# Patient Record
Sex: MALE | Race: WHITE | NOT HISPANIC OR LATINO | Employment: OTHER | URBAN - METROPOLITAN AREA
[De-identification: names, ages, dates, MRNs, and addresses within clinical notes are randomized per-mention and may not be internally consistent; named-entity substitution may affect disease eponyms.]

---

## 2023-04-19 ENCOUNTER — TELEPHONE (OUTPATIENT)
Dept: PRIMARY CARE | Facility: CLINIC | Age: 78
End: 2023-04-19
Payer: MEDICARE

## 2023-04-19 DIAGNOSIS — Z79.4 DIABETES MELLITUS TREATED WITH INSULIN (MULTI): Primary | ICD-10-CM

## 2023-04-19 DIAGNOSIS — E11.9 DIABETES MELLITUS TREATED WITH INSULIN (MULTI): Primary | ICD-10-CM

## 2023-04-19 RX ORDER — INSULIN DETEMIR 100 [IU]/ML
35 INJECTION, SOLUTION SUBCUTANEOUS 2 TIMES DAILY
Qty: 63 ML | Refills: 3 | Status: SHIPPED | OUTPATIENT
Start: 2023-04-19 | End: 2023-05-30 | Stop reason: SDUPTHER

## 2023-04-19 NOTE — TELEPHONE ENCOUNTER
Pt wife called and stated that the pharmacy needs a new rx  for levemir. Pharmacy no longer carries levemir touch it needs to be levimair flex pen sent to [facility]. Pt is out EY

## 2023-05-03 DIAGNOSIS — I10 PRIMARY HYPERTENSION: Primary | ICD-10-CM

## 2023-05-03 RX ORDER — LOSARTAN POTASSIUM 100 MG/1
TABLET ORAL
Qty: 90 TABLET | Refills: 1 | Status: SHIPPED | OUTPATIENT
Start: 2023-05-03 | End: 2023-05-30 | Stop reason: SDUPTHER

## 2023-05-19 ENCOUNTER — TELEPHONE (OUTPATIENT)
Dept: PRIMARY CARE | Facility: CLINIC | Age: 78
End: 2023-05-19
Payer: MEDICARE

## 2023-05-19 NOTE — TELEPHONE ENCOUNTER
Pt wife called and wanted to know what the name brand of sherri maria is called, the white tablet not capsules. Please advise and call kelsey back so she can order

## 2023-05-20 NOTE — TELEPHONE ENCOUNTER
I don't know if one brand of LiLabtrip Raul is better than any others.  Just get one that has good reviews on [employer] and is not too expensive.

## 2023-05-27 LAB
ALANINE AMINOTRANSFERASE (SGPT) (U/L) IN SER/PLAS: 13 U/L (ref 10–52)
ALBUMIN (G/DL) IN SER/PLAS: 4 G/DL (ref 3.4–5)
ALKALINE PHOSPHATASE (U/L) IN SER/PLAS: 60 U/L (ref 33–136)
ANION GAP IN SER/PLAS: 12 MMOL/L (ref 10–20)
ASPARTATE AMINOTRANSFERASE (SGOT) (U/L) IN SER/PLAS: 17 U/L (ref 9–39)
BASOPHILS (10*3/UL) IN BLOOD BY AUTOMATED COUNT: 0.08 X10E9/L (ref 0–0.1)
BASOPHILS/100 LEUKOCYTES IN BLOOD BY AUTOMATED COUNT: 0.7 % (ref 0–2)
BILIRUBIN TOTAL (MG/DL) IN SER/PLAS: 0.9 MG/DL (ref 0–1.2)
CALCIUM (MG/DL) IN SER/PLAS: 9.6 MG/DL (ref 8.6–10.3)
CARBON DIOXIDE, TOTAL (MMOL/L) IN SER/PLAS: 26 MMOL/L (ref 21–32)
CHLORIDE (MMOL/L) IN SER/PLAS: 104 MMOL/L (ref 98–107)
CHOLESTEROL (MG/DL) IN SER/PLAS: 115 MG/DL (ref 0–199)
CHOLESTEROL IN HDL (MG/DL) IN SER/PLAS: 29 MG/DL
CHOLESTEROL/HDL RATIO: 4
CREATININE (MG/DL) IN SER/PLAS: 1.26 MG/DL (ref 0.5–1.3)
EOSINOPHILS (10*3/UL) IN BLOOD BY AUTOMATED COUNT: 0.3 X10E9/L (ref 0–0.4)
EOSINOPHILS/100 LEUKOCYTES IN BLOOD BY AUTOMATED COUNT: 2.7 % (ref 0–6)
ERYTHROCYTE DISTRIBUTION WIDTH (RATIO) BY AUTOMATED COUNT: 16.2 % (ref 11.5–14.5)
ERYTHROCYTE MEAN CORPUSCULAR HEMOGLOBIN CONCENTRATION (G/DL) BY AUTOMATED: 31.8 G/DL (ref 32–36)
ERYTHROCYTE MEAN CORPUSCULAR VOLUME (FL) BY AUTOMATED COUNT: 86 FL (ref 80–100)
ERYTHROCYTES (10*6/UL) IN BLOOD BY AUTOMATED COUNT: 4.14 X10E12/L (ref 4.5–5.9)
ESTIMATED AVERAGE GLUCOSE FOR HBA1C: 114 MG/DL
GFR MALE: 59 ML/MIN/1.73M2
GLUCOSE (MG/DL) IN SER/PLAS: 231 MG/DL (ref 74–99)
HEMATOCRIT (%) IN BLOOD BY AUTOMATED COUNT: 35.5 % (ref 41–52)
HEMOGLOBIN (G/DL) IN BLOOD: 11.3 G/DL (ref 13.5–17.5)
HEMOGLOBIN A1C/HEMOGLOBIN TOTAL IN BLOOD: 5.6 %
IMMATURE GRANULOCYTES/100 LEUKOCYTES IN BLOOD BY AUTOMATED COUNT: 0.3 % (ref 0–0.9)
LDL: 45 MG/DL (ref 0–99)
LEUKOCYTES (10*3/UL) IN BLOOD BY AUTOMATED COUNT: 11.3 X10E9/L (ref 4.4–11.3)
LYMPHOCYTES (10*3/UL) IN BLOOD BY AUTOMATED COUNT: 5.41 X10E9/L (ref 0.8–3)
LYMPHOCYTES/100 LEUKOCYTES IN BLOOD BY AUTOMATED COUNT: 47.9 % (ref 13–44)
MONOCYTES (10*3/UL) IN BLOOD BY AUTOMATED COUNT: 0.76 X10E9/L (ref 0.05–0.8)
MONOCYTES/100 LEUKOCYTES IN BLOOD BY AUTOMATED COUNT: 6.7 % (ref 2–10)
NEUTROPHILS (10*3/UL) IN BLOOD BY AUTOMATED COUNT: 4.71 X10E9/L (ref 1.6–5.5)
NEUTROPHILS/100 LEUKOCYTES IN BLOOD BY AUTOMATED COUNT: 41.7 % (ref 40–80)
NON HDL CHOLESTEROL: 86 MG/DL
PLATELETS (10*3/UL) IN BLOOD AUTOMATED COUNT: 108 X10E9/L (ref 150–450)
POTASSIUM (MMOL/L) IN SER/PLAS: 4.1 MMOL/L (ref 3.5–5.3)
PROTEIN TOTAL: 6.2 G/DL (ref 6.4–8.2)
SODIUM (MMOL/L) IN SER/PLAS: 138 MMOL/L (ref 136–145)
TRIGLYCERIDE (MG/DL) IN SER/PLAS: 204 MG/DL (ref 0–149)
UREA NITROGEN (MG/DL) IN SER/PLAS: 29 MG/DL (ref 6–23)
VLDL: 41 MG/DL (ref 0–40)

## 2023-05-30 ENCOUNTER — TELEPHONE (OUTPATIENT)
Dept: PRIMARY CARE | Facility: CLINIC | Age: 78
End: 2023-05-30

## 2023-05-30 ENCOUNTER — OFFICE VISIT (OUTPATIENT)
Dept: PRIMARY CARE | Facility: CLINIC | Age: 78
End: 2023-05-30
Payer: MEDICARE

## 2023-05-30 VITALS
SYSTOLIC BLOOD PRESSURE: 110 MMHG | TEMPERATURE: 97.3 F | HEIGHT: 70 IN | OXYGEN SATURATION: 97 % | DIASTOLIC BLOOD PRESSURE: 58 MMHG | HEART RATE: 58 BPM | BODY MASS INDEX: 36.08 KG/M2 | WEIGHT: 252 LBS

## 2023-05-30 DIAGNOSIS — F03.90 DEMENTIA WITHOUT BEHAVIORAL DISTURBANCE, PSYCHOTIC DISTURBANCE, MOOD DISTURBANCE, OR ANXIETY, UNSPECIFIED DEMENTIA SEVERITY, UNSPECIFIED DEMENTIA TYPE (MULTI): ICD-10-CM

## 2023-05-30 DIAGNOSIS — E11.59 CORONARY ARTERY DISEASE DUE TO TYPE 2 DIABETES MELLITUS (MULTI): ICD-10-CM

## 2023-05-30 DIAGNOSIS — I25.10 CORONARY ARTERY DISEASE DUE TO TYPE 2 DIABETES MELLITUS (MULTI): ICD-10-CM

## 2023-05-30 DIAGNOSIS — R01.1 HEART MURMUR: ICD-10-CM

## 2023-05-30 DIAGNOSIS — E11.9 DIABETES MELLITUS TREATED WITH INSULIN (MULTI): ICD-10-CM

## 2023-05-30 DIAGNOSIS — E78.2 MIXED HYPERLIPIDEMIA: ICD-10-CM

## 2023-05-30 DIAGNOSIS — Z79.4 DIABETES MELLITUS TREATED WITH INSULIN (MULTI): ICD-10-CM

## 2023-05-30 DIAGNOSIS — I70.0 ATHEROSCLEROSIS OF AORTA (CMS-HCC): ICD-10-CM

## 2023-05-30 DIAGNOSIS — I10 PRIMARY HYPERTENSION: ICD-10-CM

## 2023-05-30 DIAGNOSIS — D64.9 ANEMIA, UNSPECIFIED TYPE: ICD-10-CM

## 2023-05-30 DIAGNOSIS — Z00.129 ENCOUNTER FOR ROUTINE CHILD HEALTH EXAMINATION W/O ABNORMAL FINDINGS: Primary | ICD-10-CM

## 2023-05-30 DIAGNOSIS — B37.2 CANDIDAL SKIN INFECTION: ICD-10-CM

## 2023-05-30 PROBLEM — R26.9 GAIT ABNORMALITY: Status: ACTIVE | Noted: 2023-05-30

## 2023-05-30 PROBLEM — H26.9 CATARACTS, BILATERAL: Status: ACTIVE | Noted: 2023-05-30

## 2023-05-30 PROBLEM — M11.261 CHONDROCALCINOSIS OF RIGHT KNEE: Status: ACTIVE | Noted: 2023-05-30

## 2023-05-30 PROBLEM — Z91.119 NONADHERENCE WITH DIETARY RESTRICTION: Status: ACTIVE | Noted: 2020-07-22

## 2023-05-30 PROBLEM — N18.31 STAGE 3A CHRONIC KIDNEY DISEASE (MULTI): Status: ACTIVE | Noted: 2019-11-04

## 2023-05-30 PROBLEM — E66.01 MORBID OBESITY (MULTI): Status: ACTIVE | Noted: 2019-11-04

## 2023-05-30 PROBLEM — M19.032 LOCALIZED PRIMARY OSTEOARTHRITIS OF CARPOMETACARPAL (CMC) JOINT OF LEFT WRIST: Status: ACTIVE | Noted: 2023-05-30

## 2023-05-30 PROBLEM — T46.1X5A: Status: ACTIVE | Noted: 2020-07-22

## 2023-05-30 PROBLEM — E11.65 HYPERGLYCEMIA DUE TO TYPE 2 DIABETES MELLITUS (MULTI): Status: ACTIVE | Noted: 2023-05-30

## 2023-05-30 PROBLEM — G56.22 LESION OF ULNAR NERVE, LEFT UPPER LIMB: Status: ACTIVE | Noted: 2023-05-30

## 2023-05-30 PROBLEM — M54.50 LOW BACK PAIN: Status: ACTIVE | Noted: 2019-11-04

## 2023-05-30 LAB
FERRITIN (UG/LL) IN SER/PLAS: 386 UG/L (ref 20–300)
IRON (UG/DL) IN SER/PLAS: 47 UG/DL (ref 35–150)
IRON BINDING CAPACITY (UG/DL) IN SER/PLAS: 260 UG/DL (ref 240–445)
IRON SATURATION (%) IN SER/PLAS: 18 % (ref 25–45)

## 2023-05-30 PROCEDURE — 3074F SYST BP LT 130 MM HG: CPT | Performed by: FAMILY MEDICINE

## 2023-05-30 PROCEDURE — 99214 OFFICE O/P EST MOD 30 MIN: CPT | Performed by: FAMILY MEDICINE

## 2023-05-30 PROCEDURE — 1159F MED LIST DOCD IN RCRD: CPT | Performed by: FAMILY MEDICINE

## 2023-05-30 PROCEDURE — 3078F DIAST BP <80 MM HG: CPT | Performed by: FAMILY MEDICINE

## 2023-05-30 RX ORDER — ROSUVASTATIN CALCIUM 40 MG/1
40 TABLET, COATED ORAL NIGHTLY
COMMUNITY
End: 2023-05-30 | Stop reason: SDUPTHER

## 2023-05-30 RX ORDER — INSULIN DETEMIR 100 [IU]/ML
35 INJECTION, SOLUTION SUBCUTANEOUS 2 TIMES DAILY
Qty: 63 ML | Refills: 3 | Status: SHIPPED | OUTPATIENT
Start: 2023-05-30 | End: 2023-11-21 | Stop reason: ALTCHOICE

## 2023-05-30 RX ORDER — CLOTRIMAZOLE AND BETAMETHASONE DIPROPIONATE 10; .64 MG/G; MG/G
1 CREAM TOPICAL 2 TIMES DAILY
COMMUNITY
Start: 2014-07-02 | End: 2023-12-14 | Stop reason: WASHOUT

## 2023-05-30 RX ORDER — DOXYLAMINE SUCCINATE 25 MG
TABLET ORAL 2 TIMES DAILY
Qty: 28 G | Refills: 1 | Status: SHIPPED | OUTPATIENT
Start: 2023-05-30 | End: 2023-12-14 | Stop reason: WASHOUT

## 2023-05-30 RX ORDER — INSULIN GLARGINE 100 [IU]/ML
INJECTION, SOLUTION SUBCUTANEOUS
COMMUNITY
Start: 2023-02-01 | End: 2023-05-30 | Stop reason: ALTCHOICE

## 2023-05-30 RX ORDER — TIMOLOL MALEATE 5 MG/ML
1 SOLUTION/ DROPS OPHTHALMIC 2 TIMES DAILY
COMMUNITY

## 2023-05-30 RX ORDER — BISOPROLOL FUMARATE 5 MG/1
5 TABLET, FILM COATED ORAL DAILY
COMMUNITY
Start: 2022-05-25 | End: 2023-05-30 | Stop reason: SDUPTHER

## 2023-05-30 RX ORDER — BLOOD SUGAR DIAGNOSTIC
STRIP MISCELLANEOUS
COMMUNITY
Start: 2023-05-25 | End: 2023-11-15 | Stop reason: SDUPTHER

## 2023-05-30 RX ORDER — BRIMONIDINE TARTRATE 2 MG/ML
1 SOLUTION/ DROPS OPHTHALMIC 2 TIMES DAILY
COMMUNITY
Start: 2017-08-01

## 2023-05-30 RX ORDER — BISOPROLOL FUMARATE 5 MG/1
5 TABLET, FILM COATED ORAL DAILY
Qty: 90 TABLET | Refills: 3 | Status: SHIPPED | OUTPATIENT
Start: 2023-05-30 | End: 2024-03-21 | Stop reason: SDUPTHER

## 2023-05-30 RX ORDER — HYDROCHLOROTHIAZIDE 25 MG/1
25 TABLET ORAL DAILY
Qty: 90 TABLET | Refills: 3 | Status: SHIPPED | OUTPATIENT
Start: 2023-05-30 | End: 2024-03-21 | Stop reason: SDUPTHER

## 2023-05-30 RX ORDER — INSULIN ASPART 100 [IU]/ML
35 INJECTION, SOLUTION INTRAVENOUS; SUBCUTANEOUS
Qty: 94.5 ML | Refills: 3 | Status: SHIPPED | OUTPATIENT
Start: 2023-05-30 | End: 2024-03-21 | Stop reason: SDUPTHER

## 2023-05-30 RX ORDER — ASPIRIN 81 MG/1
81 TABLET ORAL DAILY
COMMUNITY
Start: 2006-06-15

## 2023-05-30 RX ORDER — DONEPEZIL HYDROCHLORIDE 10 MG/1
10 TABLET, FILM COATED ORAL NIGHTLY
Qty: 90 TABLET | Refills: 3 | Status: SHIPPED | OUTPATIENT
Start: 2023-05-30 | End: 2023-08-22 | Stop reason: ALTCHOICE

## 2023-05-30 RX ORDER — FERROUS SULFATE 325(65) MG
1 TABLET ORAL 3 TIMES WEEKLY
COMMUNITY
End: 2023-05-30 | Stop reason: ALTCHOICE

## 2023-05-30 RX ORDER — INSULIN ASPART 100 [IU]/ML
INJECTION, SOLUTION INTRAVENOUS; SUBCUTANEOUS
COMMUNITY
End: 2023-05-30 | Stop reason: SDUPTHER

## 2023-05-30 RX ORDER — ROSUVASTATIN CALCIUM 40 MG/1
40 TABLET, COATED ORAL NIGHTLY
Qty: 90 TABLET | Refills: 3 | Status: SHIPPED | OUTPATIENT
Start: 2023-05-30 | End: 2024-03-21 | Stop reason: SDUPTHER

## 2023-05-30 RX ORDER — HYDROCHLOROTHIAZIDE 25 MG/1
25 TABLET ORAL DAILY
COMMUNITY
Start: 2022-05-25 | End: 2023-05-30 | Stop reason: SDUPTHER

## 2023-05-30 RX ORDER — DONEPEZIL HYDROCHLORIDE 10 MG/1
10 TABLET, FILM COATED ORAL DAILY
COMMUNITY
Start: 2022-10-05 | End: 2023-05-30 | Stop reason: SDUPTHER

## 2023-05-30 RX ORDER — LOSARTAN POTASSIUM 100 MG/1
100 TABLET ORAL DAILY
Qty: 90 TABLET | Refills: 3 | Status: SHIPPED | OUTPATIENT
Start: 2023-05-30 | End: 2024-03-21 | Stop reason: SDUPTHER

## 2023-05-30 ASSESSMENT — ENCOUNTER SYMPTOMS: HYPERTENSION: 1

## 2023-05-30 NOTE — TELEPHONE ENCOUNTER
----- Message from Ulices Chaudhary MD sent at 5/29/2023  8:26 PM EDT -----  Please call lab and add on iron TIBC and ferritin diagnosis anemia

## 2023-05-30 NOTE — PROGRESS NOTES
Subjective   Patient ID: Michael Dougherty is a 77 y.o. male who presents for Hypertension (Recheck), Hyperlipidemia (Review BW), and Diabetes.  Hypertension    Hyperlipidemia    Diabetes      DM - A1C is 5.9.  35/35/35.    Glucose log reviewed and scanned    HTN -  controlled  Lipids - Rosuvastatin 40 mg   CAD -  no sxs  CKD - GFR = >60, >60  Aortic valve sclerosis  Rash:  has itchy rash on L axilla  Anemia: HGB is lower.  Iron lower, ferritin is higher.  .   Feels well    Dementia: didn't benefit from lions candace.  Haven't tried Aricept yet.    Cough with ACE and ARB.  HCTZ 25mg - renal     Current Outpatient Medications on File Prior to Visit   Medication Sig Dispense Refill    aspirin 81 mg EC tablet Take 1 tablet (81 mg) by mouth once daily.      bisoprolol (Zebeta) 5 mg tablet Take 1 tablet (5 mg) by mouth once daily.      brimonidine (AlphaGAN P) 0.2 % ophthalmic solution Administer 1 drop into both eyes 2 times a day.      ferrous sulfate 325 (65 Fe) MG tablet Take 1 tablet (325 mg) by mouth 3 times a week.      hydroCHLOROthiazide (HYDRODiuril) 25 mg tablet Take 1 tablet (25 mg) by mouth once daily.      insulin detemir (Levemir FlexPen) 100 unit/mL (3 mL) pen Inject 35 Units under the skin in the morning and 35 Units before bedtime. Take as directed per insulin instructions.. 63 mL 3    losartan (Cozaar) 100 mg tablet TAKE 1 TABLET BY MOUTH EVERY DAY 90 tablet 1    NovoLOG FlexPen U-100 Insulin 100 unit/mL (3 mL) pen INJECT UP TO 75 UNITS UNDER THE SKIN THREE TIMES DAILY WITH MEALS      OneTouch Ultra Test strip USE 1 TEST STRIP 3 TIMES A DAY      rosuvastatin (Crestor) 40 mg tablet Take 1 tablet (40 mg) by mouth once daily at bedtime.      timolol (Timoptic) 0.5 % ophthalmic solution Administer 1 drop into both eyes 2 times a day.      clotrimazole-betamethasone (Lotrisone) cream Apply 1 Application topically 2 times a day.      donepezil (Aricept) 10 mg tablet Take 1 tablet (10 mg) by mouth early in the  morning..      insulin glargine (Lantus) 100 unit/mL (3 mL) pen Inject under the skin.       No current facility-administered medications on file prior to visit.        Vitals:    05/30/23 1343   BP: 110/58   Pulse: 58   Temp: 36.3 °C (97.3 °F)   SpO2: 97%       Review of Systems   All other systems reviewed and are negative.      Objective     Physical Exam  Constitutional:       Appearance: Normal appearance. He is well-developed.   HENT:      Head: Atraumatic.   Cardiovascular:      Rate and Rhythm: Normal rate and regular rhythm.      Heart sounds: Murmur heard.   Pulmonary:      Effort: Pulmonary effort is normal.      Breath sounds: Normal breath sounds.   Abdominal:      General: Bowel sounds are normal.      Palpations: Abdomen is soft.   Skin:     General: Skin is warm.   Neurological:      General: No focal deficit present.      Mental Status: He is alert.   Psychiatric:         Mood and Affect: Mood normal.         Orders Only on 05/27/2023   Component Date Value Ref Range Status    Glucose 05/27/2023 231 (H)  74 - 99 mg/dL Final    Sodium 05/27/2023 138  136 - 145 mmol/L Final    Potassium 05/27/2023 4.1  3.5 - 5.3 mmol/L Final    Chloride 05/27/2023 104  98 - 107 mmol/L Final    Bicarbonate 05/27/2023 26  21 - 32 mmol/L Final    Anion Gap 05/27/2023 12  10 - 20 mmol/L Final    Urea Nitrogen 05/27/2023 29 (H)  6 - 23 mg/dL Final    Creatinine 05/27/2023 1.26  0.50 - 1.30 mg/dL Final    GFR MALE 05/27/2023 59 (A)  >90 mL/min/1.73m2 Final    Comment:  CALCULATIONS OF ESTIMATED GFR ARE PERFORMED   USING THE 2021 CKD-EPI STUDY REFIT EQUATION   WITHOUT THE RACE VARIABLE FOR THE IDMS-TRACEABLE   CREATININE METHODS.    https://jasn.asnjournals.org/content/early/2021/09/22/ASN.2697660838      Calcium 05/27/2023 9.6  8.6 - 10.3 mg/dL Final    Albumin 05/27/2023 4.0  3.4 - 5.0 g/dL Final    Alkaline Phosphatase 05/27/2023 60  33 - 136 U/L Final    Total Protein 05/27/2023 6.2 (L)  6.4 - 8.2 g/dL Final    AST  05/27/2023 17  9 - 39 U/L Final    Total Bilirubin 05/27/2023 0.9  0.0 - 1.2 mg/dL Final    ALT (SGPT) 05/27/2023 13  10 - 52 U/L Final    Comment:  Patients treated with Sulfasalazine may generate    falsely decreased results for ALT.      WBC 05/27/2023 11.3  4.4 - 11.3 x10E9/L Final    RBC 05/27/2023 4.14 (L)  4.50 - 5.90 x10E12/L Final    Hemoglobin 05/27/2023 11.3 (L)  13.5 - 17.5 g/dL Final    Hematocrit 05/27/2023 35.5 (L)  41.0 - 52.0 % Final    MCV 05/27/2023 86  80 - 100 fL Final    MCHC 05/27/2023 31.8 (L)  32.0 - 36.0 g/dL Final    Platelets 05/27/2023 108 (L)  150 - 450 x10E9/L Final    RDW 05/27/2023 16.2 (H)  11.5 - 14.5 % Final    Neutrophils % 05/27/2023 41.7  40.0 - 80.0 % Final    Immature Granulocytes %, Automated 05/27/2023 0.3  0.0 - 0.9 % Final    Comment:  Immature Granulocyte Count (IG) includes promyelocytes,    myelocytes and metamyelocytes but does not include bands.   Percent differential counts (%) should be interpreted in the   context of the absolute cell counts (cells/L).      Lymphocytes % 05/27/2023 47.9  13.0 - 44.0 % Final    Monocytes % 05/27/2023 6.7  2.0 - 10.0 % Final    Eosinophils % 05/27/2023 2.7  0.0 - 6.0 % Final    Basophils % 05/27/2023 0.7  0.0 - 2.0 % Final    Neutrophils Absolute 05/27/2023 4.71  1.60 - 5.50 x10E9/L Final    Lymphocytes Absolute 05/27/2023 5.41 (H)  0.80 - 3.00 x10E9/L Final    Monocytes Absolute 05/27/2023 0.76  0.05 - 0.80 x10E9/L Final    Eosinophils Absolute 05/27/2023 0.30  0.00 - 0.40 x10E9/L Final    Basophils Absolute 05/27/2023 0.08  0.00 - 0.10 x10E9/L Final    Hemoglobin A1C 05/27/2023 5.6  % Final    Comment:      Diagnosis of Diabetes-Adults   Non-Diabetic: < or = 5.6%   Increased risk for developing diabetes: 5.7-6.4%   Diagnostic of diabetes: > or = 6.5%  .       Monitoring of Diabetes                Age (y)     Therapeutic Goal (%)   Adults:          >18           <7.0   Pediatrics:    13-18           <7.5                   7-12            <8.0                   0- 6            7.5-8.5   American Diabetes Association. Diabetes Care 33(S1), Jan 2010.      Estimated Average Glucose 05/27/2023 114  MG/DL Final    Cholesterol 05/27/2023 115  0 - 199 mg/dL Final    Comment: .      AGE      DESIRABLE   BORDERLINE HIGH   HIGH     0-19 Y     0 - 169       170 - 199     >/= 200    20-24 Y     0 - 189       190 - 224     >/= 225         >24 Y     0 - 199       200 - 239     >/= 240   **All ranges are based on fasting samples. Specific   therapeutic targets will vary based on patient-specific   cardiac risk.  .   Pediatric guidelines reference:Pediatrics 2011, 128(S5).   Adult guidelines reference: NCEP ATPIII Guidelines,     GELA 2001, 258:2489-97  .   Venipuncture immediately after or during the    administration of Metamizole may lead to falsely   low results. Testing should be performed immediately   prior to Metamizole dosing.      HDL 05/27/2023 29.0 (A)  mg/dL Final    Comment: .      AGE      VERY LOW   LOW     NORMAL    HIGH       0-19 Y       < 35   < 40     40-45     ----    20-24 Y       ----   < 40       >45     ----      >24 Y       ----   < 40     40-60      >60  .      Cholesterol/HDL Ratio 05/27/2023 4.0   Final    Comment: REF VALUES  DESIRABLE  < 3.4  HIGH RISK  > 5.0      LDL 05/27/2023 45  0 - 99 mg/dL Final    Comment: .                           NEAR      BORD      AGE      DESIRABLE  OPTIMAL    HIGH     HIGH     VERY HIGH     0-19 Y     0 - 109     ---    110-129   >/= 130     ----    20-24 Y     0 - 119     ---    120-159   >/= 160     ----      >24 Y     0 -  99   100-129  130-159   160-189     >/=190  .      VLDL 05/27/2023 41 (H)  0 - 40 mg/dL Final    Triglycerides 05/27/2023 204 (H)  0 - 149 mg/dL Final    Comment: .      AGE      DESIRABLE   BORDERLINE HIGH   HIGH     VERY HIGH   0 D-90 D    19 - 174         ----         ----        ----  91 D- 9 Y     0 -  74        75 -  99     >/= 100      ----    10-19 Y     0 -  89         90 - 129     >/= 130      ----    20-24 Y     0 - 114       115 - 149     >/= 150      ----         >24 Y     0 - 149       150 - 199    200- 499    >/= 500  .   Venipuncture immediately after or during the    administration of Metamizole may lead to falsely   low results. Testing should be performed immediately   prior to Metamizole dosing.      Non HDL Cholesterol 05/27/2023 86  mg/dL Final    Comment:     AGE      DESIRABLE   BORDERLINE HIGH   HIGH     VERY HIGH     0-19 Y     0 - 119       120 - 144     >/= 145    >/= 160    20-24 Y     0 - 149       150 - 189     >/= 190      ----         >24 Y    30 MG/DL ABOVE LDL CHOLESTEROL GOAL  .      Iron 05/27/2023 47  35 - 150 ug/dL Final    TIBC 05/27/2023 260  240 - 445 ug/dL Final    Iron Saturation 05/27/2023 18 (L)  25 - 45 % Final    Ferritin 05/27/2023 386 (H)  20 - 300 ug/L Final       Assessment/Plan   Problem List Items Addressed This Visit       Atherosclerosis of aorta (CMS/HCC)    Relevant Orders    Transthoracic echo (TTE) complete    CBC and Auto Differential    Heart murmur    Relevant Orders    Transthoracic echo (TTE) complete    Hyperlipidemia    Relevant Medications    rosuvastatin (Crestor) 40 mg tablet     Other Visit Diagnoses       Encounter for routine child health examination w/o abnormal findings    -  Primary    Primary hypertension        Relevant Medications    losartan (Cozaar) 100 mg tablet    hydroCHLOROthiazide (HYDRODiuril) 25 mg tablet    bisoprolol (Zebeta) 5 mg tablet    Other Relevant Orders    Comprehensive Metabolic Panel    Diabetes mellitus treated with insulin (CMS/HCC)        Relevant Medications    NovoLOG FlexPen U-100 Insulin 100 unit/mL (3 mL) pen    insulin detemir (Levemir FlexPen) 100 unit/mL (3 mL) pen    Other Relevant Orders    Hemoglobin A1C    Candidal skin infection        Relevant Medications    miconazole (AntifungaL Cream, miconazole,) 2 % cream    Dementia without behavioral disturbance, psychotic  disturbance, mood disturbance, or anxiety, unspecified dementia severity, unspecified dementia type (CMS/[facility])        Relevant Medications    donepezil (Aricept) 10 mg tablet    Anemia, unspecified type        Relevant Orders    Iron and TIBC    Ferritin    Vitamin B12    Coronary artery disease due to type 2 diabetes mellitus (CMS/[facility])        Relevant Medications    bisoprolol (Zebeta) 5 mg tablet    Other Relevant Orders    CBC and Auto Differential          OK to stop iron.  Suspect anemia of chronic disease.  Try taking OTC B12 and folate.  Checking echo to evaluate murmur.  Fu in 3 mo

## 2023-06-27 ENCOUNTER — TELEPHONE (OUTPATIENT)
Dept: PRIMARY CARE | Facility: CLINIC | Age: 78
End: 2023-06-27
Payer: MEDICARE

## 2023-06-29 ENCOUNTER — TELEPHONE (OUTPATIENT)
Dept: PRIMARY CARE | Facility: CLINIC | Age: 78
End: 2023-06-29
Payer: MEDICARE

## 2023-06-29 NOTE — TELEPHONE ENCOUNTER
----- Message from Kayleen Nunez DO sent at 6/28/2023 11:09 PM EDT -----  Pls tell pt that he has severe aortic valve disease. Rec he see cardiology to further eval and discuss. Has dilated rt ventrial and left atrium. Heart is puming well   ----- Message -----  From: Willow Hirsch RN  Sent: 6/23/2023   2:27 PM EDT  To: Kayleen Nunez DO    [facility] [place] pt

## 2023-06-29 NOTE — TELEPHONE ENCOUNTER
----- Message from Kayleen Nunez DO sent at 6/28/2023 11:09 PM EDT -----  Pls tell pt that he has severe aortic valve disease. Rec he see cardiology to further eval and discuss. Has dilated rt ventrial and left atrium. Heart is puming well   ----- Message -----  From: Willow Hirsch RN  Sent: 6/23/2023   2:27 PM EDT  To: Kayleen Nunez DO    [facility] [place] pt

## 2023-08-16 ENCOUNTER — LAB (OUTPATIENT)
Dept: LAB | Facility: LAB | Age: 78
End: 2023-08-16
Payer: MEDICARE

## 2023-08-16 DIAGNOSIS — D64.9 ANEMIA, UNSPECIFIED TYPE: ICD-10-CM

## 2023-08-16 DIAGNOSIS — Z79.4 DIABETES MELLITUS TREATED WITH INSULIN (MULTI): ICD-10-CM

## 2023-08-16 DIAGNOSIS — I10 PRIMARY HYPERTENSION: ICD-10-CM

## 2023-08-16 DIAGNOSIS — E11.9 DIABETES MELLITUS TREATED WITH INSULIN (MULTI): ICD-10-CM

## 2023-08-16 DIAGNOSIS — I70.0 ATHEROSCLEROSIS OF AORTA (CMS-HCC): ICD-10-CM

## 2023-08-16 DIAGNOSIS — I25.10 CORONARY ARTERY DISEASE DUE TO TYPE 2 DIABETES MELLITUS (MULTI): ICD-10-CM

## 2023-08-16 DIAGNOSIS — E11.59 CORONARY ARTERY DISEASE DUE TO TYPE 2 DIABETES MELLITUS (MULTI): ICD-10-CM

## 2023-08-16 LAB
ALANINE AMINOTRANSFERASE (SGPT) (U/L) IN SER/PLAS: 17 U/L (ref 10–52)
ALBUMIN (G/DL) IN SER/PLAS: 4.1 G/DL (ref 3.4–5)
ALKALINE PHOSPHATASE (U/L) IN SER/PLAS: 54 U/L (ref 33–136)
ANION GAP IN SER/PLAS: 11 MMOL/L (ref 10–20)
ASPARTATE AMINOTRANSFERASE (SGOT) (U/L) IN SER/PLAS: 18 U/L (ref 9–39)
BASOPHILS (10*3/UL) IN BLOOD BY AUTOMATED COUNT: 0.06 X10E9/L (ref 0–0.1)
BASOPHILS/100 LEUKOCYTES IN BLOOD BY AUTOMATED COUNT: 0.4 % (ref 0–2)
BILIRUBIN TOTAL (MG/DL) IN SER/PLAS: 0.8 MG/DL (ref 0–1.2)
CALCIUM (MG/DL) IN SER/PLAS: 9.2 MG/DL (ref 8.6–10.3)
CARBON DIOXIDE, TOTAL (MMOL/L) IN SER/PLAS: 28 MMOL/L (ref 21–32)
CHLORIDE (MMOL/L) IN SER/PLAS: 104 MMOL/L (ref 98–107)
COBALAMIN (VITAMIN B12) (PG/ML) IN SER/PLAS: 974 PG/ML (ref 211–911)
CREATININE (MG/DL) IN SER/PLAS: 1.49 MG/DL (ref 0.5–1.3)
EOSINOPHILS (10*3/UL) IN BLOOD BY AUTOMATED COUNT: 0.11 X10E9/L (ref 0–0.4)
EOSINOPHILS/100 LEUKOCYTES IN BLOOD BY AUTOMATED COUNT: 0.8 % (ref 0–6)
ERYTHROCYTE DISTRIBUTION WIDTH (RATIO) BY AUTOMATED COUNT: 16 % (ref 11.5–14.5)
ERYTHROCYTE MEAN CORPUSCULAR HEMOGLOBIN CONCENTRATION (G/DL) BY AUTOMATED: 32.5 G/DL (ref 32–36)
ERYTHROCYTE MEAN CORPUSCULAR VOLUME (FL) BY AUTOMATED COUNT: 85 FL (ref 80–100)
ERYTHROCYTES (10*6/UL) IN BLOOD BY AUTOMATED COUNT: 4.3 X10E12/L (ref 4.5–5.9)
ESTIMATED AVERAGE GLUCOSE FOR HBA1C: 123 MG/DL
FERRITIN (UG/LL) IN SER/PLAS: 376 UG/L (ref 20–300)
GFR MALE: 48 ML/MIN/1.73M2
GLUCOSE (MG/DL) IN SER/PLAS: 167 MG/DL (ref 74–99)
HEMATOCRIT (%) IN BLOOD BY AUTOMATED COUNT: 36.6 % (ref 41–52)
HEMOGLOBIN (G/DL) IN BLOOD: 11.9 G/DL (ref 13.5–17.5)
HEMOGLOBIN A1C/HEMOGLOBIN TOTAL IN BLOOD: 5.9 %
IMMATURE GRANULOCYTES/100 LEUKOCYTES IN BLOOD BY AUTOMATED COUNT: 0.4 % (ref 0–0.9)
IRON (UG/DL) IN SER/PLAS: 54 UG/DL (ref 35–150)
IRON BINDING CAPACITY (UG/DL) IN SER/PLAS: 286 UG/DL (ref 240–445)
IRON SATURATION (%) IN SER/PLAS: 19 % (ref 25–45)
LEUKOCYTES (10*3/UL) IN BLOOD BY AUTOMATED COUNT: 13.5 X10E9/L (ref 4.4–11.3)
LYMPHOCYTES (10*3/UL) IN BLOOD BY AUTOMATED COUNT: 5.82 X10E9/L (ref 0.8–3)
LYMPHOCYTES/100 LEUKOCYTES IN BLOOD BY AUTOMATED COUNT: 43.1 % (ref 13–44)
MONOCYTES (10*3/UL) IN BLOOD BY AUTOMATED COUNT: 0.8 X10E9/L (ref 0.05–0.8)
MONOCYTES/100 LEUKOCYTES IN BLOOD BY AUTOMATED COUNT: 5.9 % (ref 2–10)
NEUTROPHILS (10*3/UL) IN BLOOD BY AUTOMATED COUNT: 6.66 X10E9/L (ref 1.6–5.5)
NEUTROPHILS/100 LEUKOCYTES IN BLOOD BY AUTOMATED COUNT: 49.4 % (ref 40–80)
PLATELETS (10*3/UL) IN BLOOD AUTOMATED COUNT: 127 X10E9/L (ref 150–450)
POTASSIUM (MMOL/L) IN SER/PLAS: 4 MMOL/L (ref 3.5–5.3)
PROTEIN TOTAL: 6.2 G/DL (ref 6.4–8.2)
RBC MORPHOLOGY IN BLOOD: NORMAL
SODIUM (MMOL/L) IN SER/PLAS: 139 MMOL/L (ref 136–145)
UREA NITROGEN (MG/DL) IN SER/PLAS: 38 MG/DL (ref 6–23)

## 2023-08-16 PROCEDURE — 83540 ASSAY OF IRON: CPT

## 2023-08-16 PROCEDURE — 80053 COMPREHEN METABOLIC PANEL: CPT

## 2023-08-16 PROCEDURE — 36415 COLL VENOUS BLD VENIPUNCTURE: CPT

## 2023-08-16 PROCEDURE — 85025 COMPLETE CBC W/AUTO DIFF WBC: CPT

## 2023-08-16 PROCEDURE — 82728 ASSAY OF FERRITIN: CPT

## 2023-08-16 PROCEDURE — 83550 IRON BINDING TEST: CPT

## 2023-08-16 PROCEDURE — 83036 HEMOGLOBIN GLYCOSYLATED A1C: CPT

## 2023-08-16 PROCEDURE — 82607 VITAMIN B-12: CPT

## 2023-08-21 ENCOUNTER — APPOINTMENT (OUTPATIENT)
Dept: PRIMARY CARE | Facility: CLINIC | Age: 78
End: 2023-08-21
Payer: MEDICARE

## 2023-08-22 ENCOUNTER — OFFICE VISIT (OUTPATIENT)
Dept: PRIMARY CARE | Facility: CLINIC | Age: 78
End: 2023-08-22
Payer: MEDICARE

## 2023-08-22 VITALS
OXYGEN SATURATION: 97 % | SYSTOLIC BLOOD PRESSURE: 126 MMHG | WEIGHT: 245 LBS | DIASTOLIC BLOOD PRESSURE: 72 MMHG | HEART RATE: 64 BPM | TEMPERATURE: 97 F | BODY MASS INDEX: 34.9 KG/M2

## 2023-08-22 DIAGNOSIS — Z79.4 TYPE 2 DIABETES MELLITUS WITHOUT COMPLICATION, WITH LONG-TERM CURRENT USE OF INSULIN (MULTI): Primary | ICD-10-CM

## 2023-08-22 DIAGNOSIS — I10 ESSENTIAL HYPERTENSION: ICD-10-CM

## 2023-08-22 DIAGNOSIS — E78.2 MIXED HYPERLIPIDEMIA: ICD-10-CM

## 2023-08-22 DIAGNOSIS — I25.10 CORONARY ARTERIOSCLEROSIS: ICD-10-CM

## 2023-08-22 DIAGNOSIS — I35.0 AORTIC VALVE STENOSIS, ETIOLOGY OF CARDIAC VALVE DISEASE UNSPECIFIED: ICD-10-CM

## 2023-08-22 DIAGNOSIS — E11.9 TYPE 2 DIABETES MELLITUS WITHOUT COMPLICATION, WITH LONG-TERM CURRENT USE OF INSULIN (MULTI): Primary | ICD-10-CM

## 2023-08-22 PROCEDURE — 99214 OFFICE O/P EST MOD 30 MIN: CPT | Performed by: FAMILY MEDICINE

## 2023-08-22 PROCEDURE — 3078F DIAST BP <80 MM HG: CPT | Performed by: FAMILY MEDICINE

## 2023-08-22 PROCEDURE — 1036F TOBACCO NON-USER: CPT | Performed by: FAMILY MEDICINE

## 2023-08-22 PROCEDURE — 3074F SYST BP LT 130 MM HG: CPT | Performed by: FAMILY MEDICINE

## 2023-08-22 PROCEDURE — 1159F MED LIST DOCD IN RCRD: CPT | Performed by: FAMILY MEDICINE

## 2023-08-22 RX ORDER — PEN NEEDLE, DIABETIC 30 GX3/16"
NEEDLE, DISPOSABLE MISCELLANEOUS
Qty: 300 EACH | Refills: 3 | Status: SHIPPED | OUTPATIENT
Start: 2023-08-22 | End: 2024-03-21 | Stop reason: SDUPTHER

## 2023-08-22 RX ORDER — FLASH GLUCOSE SCANNING READER
EACH MISCELLANEOUS
Qty: 1 EACH | Refills: 0 | Status: SHIPPED | OUTPATIENT
Start: 2023-08-22

## 2023-08-22 RX ORDER — FLASH GLUCOSE SENSOR
KIT MISCELLANEOUS
Qty: 1 EACH | Refills: 0 | Status: SHIPPED | OUTPATIENT
Start: 2023-08-22 | End: 2024-03-21 | Stop reason: SDUPTHER

## 2023-08-22 ASSESSMENT — ENCOUNTER SYMPTOMS: HYPERTENSION: 1

## 2023-08-22 NOTE — PROGRESS NOTES
Subjective   Patient ID: Michael Dougherty is a 77 y.o. male who presents for Hypertension (Recheck. Review labs.).  Hypertension      DM - A1C is 5.9.  35/35/35.    Glucose log reviewed and scanned    HTN -  controlled  Lipids - Rosuvastatin 40 mg   CAD -  no sxs  CKD - GFR = >60, >60  Aortic valve stenosis  Rash:  has itchy rash on L axilla  Anemia: HGB is higher  .   Feels well    Dementia: couldnt' tolerate aricept.  Has been getting worse.    Cough with ACE and ARB.  HCTZ 25mg - renal     Patient Active Problem List   Diagnosis    Adverse effect of calcium-channel blocker    Atherosclerosis of aorta (CMS/HCC)    Cataracts, bilateral    Chondrocalcinosis of right knee    Coronary arteriosclerosis    Diabetes mellitus (CMS/HCC)    Gait abnormality    Heart murmur    Essential hypertension    Hyperglycemia due to type 2 diabetes mellitus (CMS/HCC)    Hyperlipidemia    Lesion of ulnar nerve, left upper limb    Localized primary osteoarthritis of carpometacarpal (CMC) joint of left wrist    Low back pain    Morbid obesity (CMS/HCC)    Nonadherence with dietary restriction    Stage 3a chronic kidney disease (CMS/HCC)       Social Connections: Not on file       Current Outpatient Medications on File Prior to Visit   Medication Sig Dispense Refill    aspirin 81 mg EC tablet Take 1 tablet (81 mg) by mouth once daily.      bisoprolol (Zebeta) 5 mg tablet Take 1 tablet (5 mg) by mouth once daily. 90 tablet 3    brimonidine (AlphaGAN P) 0.2 % ophthalmic solution Administer 1 drop into both eyes 2 times a day.      clotrimazole-betamethasone (Lotrisone) cream Apply 1 Application topically 2 times a day.      hydroCHLOROthiazide (HYDRODiuril) 25 mg tablet Take 1 tablet (25 mg) by mouth once daily. 90 tablet 3    insulin detemir (Levemir FlexPen) 100 unit/mL (3 mL) pen Inject 35 Units under the skin 2 times a day. Take as directed per insulin instructions. 63 mL 3    losartan (Cozaar) 100 mg tablet Take 1 tablet (100 mg) by mouth  once daily. 90 tablet 3    miconazole (AntifungaL Cream, miconazole,) 2 % cream Apply topically 2 times a day. 28 g 1    NovoLOG FlexPen U-100 Insulin 100 unit/mL (3 mL) pen Inject 35 Units under the skin 3 times a day with meals. Take as directed per insulin instructions. 94.5 mL 3    OneTouch Ultra Test strip USE 1 TEST STRIP 3 TIMES A DAY      rosuvastatin (Crestor) 40 mg tablet Take 1 tablet (40 mg) by mouth once daily at bedtime. 90 tablet 3    timolol (Timoptic) 0.5 % ophthalmic solution Administer 1 drop into both eyes 2 times a day.      [DISCONTINUED] donepezil (Aricept) 10 mg tablet Take 1 tablet (10 mg) by mouth once daily at bedtime. 90 tablet 3     No current facility-administered medications on file prior to visit.        Vitals:    08/22/23 1354   BP: 126/72   Pulse: 64   Temp: 36.1 °C (97 °F)   SpO2: 97%     Vitals:    08/22/23 1354   Weight: 111 kg (245 lb)       Review of Systems   All other systems reviewed and are negative.      Objective     Physical Exam  Vitals reviewed.   Constitutional:       General: He is not in acute distress.     Appearance: Normal appearance. He is well-developed. He is not diaphoretic.   HENT:      Head: Normocephalic and atraumatic.      Right Ear: Tympanic membrane normal.      Left Ear: Tympanic membrane normal.      Nose: Nose normal.      Mouth/Throat:      Mouth: Mucous membranes are moist.   Eyes:      Pupils: Pupils are equal, round, and reactive to light.   Cardiovascular:      Rate and Rhythm: Normal rate and regular rhythm.      Heart sounds: Normal heart sounds. No murmur heard.     No friction rub. No gallop.   Pulmonary:      Effort: Pulmonary effort is normal.      Breath sounds: Normal breath sounds. No rales.   Abdominal:      General: Bowel sounds are normal.      Palpations: Abdomen is soft.      Tenderness: There is no abdominal tenderness.   Musculoskeletal:      Cervical back: Normal range of motion and neck supple.   Skin:     General: Skin is  warm and dry.   Neurological:      Mental Status: He is alert.   Psychiatric:         Mood and Affect: Mood normal.         Lab on 08/16/2023   Component Date Value Ref Range Status    Hemoglobin A1C 08/16/2023 5.9 (A)  % Final         Diagnosis of Diabetes-Adults   Non-Diabetic: < or = 5.6%   Increased risk for developing diabetes: 5.7-6.4%   Diagnostic of diabetes: > or = 6.5%  .       Monitoring of Diabetes                Age (y)     Therapeutic Goal (%)   Adults:          >18           <7.0   Pediatrics:    13-18           <7.5                   7-12           <8.0                   0- 6            7.5-8.5   American Diabetes Association. Diabetes Care 33(S1), Jan 2010.    Estimated Average Glucose 08/16/2023 123  MG/DL Final    Glucose 08/16/2023 167 (H)  74 - 99 mg/dL Final    Sodium 08/16/2023 139  136 - 145 mmol/L Final    Potassium 08/16/2023 4.0  3.5 - 5.3 mmol/L Final    Chloride 08/16/2023 104  98 - 107 mmol/L Final    Bicarbonate 08/16/2023 28  21 - 32 mmol/L Final    Anion Gap 08/16/2023 11  10 - 20 mmol/L Final    Urea Nitrogen 08/16/2023 38 (H)  6 - 23 mg/dL Final    Creatinine 08/16/2023 1.49 (H)  0.50 - 1.30 mg/dL Final    GFR MALE 08/16/2023 48 (A)  >90 mL/min/1.73m2 Final     CALCULATIONS OF ESTIMATED GFR ARE PERFORMED   USING THE 2021 CKD-EPI STUDY REFIT EQUATION   WITHOUT THE RACE VARIABLE FOR THE IDMS-TRACEABLE   CREATININE METHODS.    https://jasn.asnjournals.org/content/early/2021/09/22/ASN.9251199743    Calcium 08/16/2023 9.2  8.6 - 10.3 mg/dL Final    Albumin 08/16/2023 4.1  3.4 - 5.0 g/dL Final    Alkaline Phosphatase 08/16/2023 54  33 - 136 U/L Final    Total Protein 08/16/2023 6.2 (L)  6.4 - 8.2 g/dL Final    AST 08/16/2023 18  9 - 39 U/L Final    Total Bilirubin 08/16/2023 0.8  0.0 - 1.2 mg/dL Final    ALT (SGPT) 08/16/2023 17  10 - 52 U/L Final     Patients treated with Sulfasalazine may generate    falsely decreased results for ALT.    WBC 08/16/2023 13.5 (H)  4.4 - 11.3 x10E9/L  Final    RBC 08/16/2023 4.30 (L)  4.50 - 5.90 x10E12/L Final    Hemoglobin 08/16/2023 11.9 (L)  13.5 - 17.5 g/dL Final    Hematocrit 08/16/2023 36.6 (L)  41.0 - 52.0 % Final    MCV 08/16/2023 85  80 - 100 fL Final    MCHC 08/16/2023 32.5  32.0 - 36.0 g/dL Final    Platelets 08/16/2023 127 (L)  150 - 450 x10E9/L Final    RDW 08/16/2023 16.0 (H)  11.5 - 14.5 % Final    Neutrophils % 08/16/2023 49.4  40.0 - 80.0 % Final    Immature Granulocytes %, Automated 08/16/2023 0.4  0.0 - 0.9 % Final     Immature Granulocyte Count (IG) includes promyelocytes,    myelocytes and metamyelocytes but does not include bands.   Percent differential counts (%) should be interpreted in the   context of the absolute cell counts (cells/L).    Lymphocytes % 08/16/2023 43.1  13.0 - 44.0 % Final    Monocytes % 08/16/2023 5.9  2.0 - 10.0 % Final    Eosinophils % 08/16/2023 0.8  0.0 - 6.0 % Final    Basophils % 08/16/2023 0.4  0.0 - 2.0 % Final    Neutrophils Absolute 08/16/2023 6.66 (H)  1.60 - 5.50 x10E9/L Final    Lymphocytes Absolute 08/16/2023 5.82 (H)  0.80 - 3.00 x10E9/L Final    Monocytes Absolute 08/16/2023 0.80  0.05 - 0.80 x10E9/L Final    Eosinophils Absolute 08/16/2023 0.11  0.00 - 0.40 x10E9/L Final    Basophils Absolute 08/16/2023 0.06  0.00 - 0.10 x10E9/L Final    Iron 08/16/2023 54  35 - 150 ug/dL Final    TIBC 08/16/2023 286  240 - 445 ug/dL Final    Iron Saturation 08/16/2023 19 (L)  25 - 45 % Final    Ferritin 08/16/2023 376 (H)  20 - 300 ug/L Final    Vitamin B-12 08/16/2023 974 (H)  211 - 911 pg/mL Final    RBC Morphology 08/16/2023 SEE COMMENT   Final    NO SIGNIFICANT RBC ABNORMALITIES SEEN ON  SMEAR REVIEW.       Assessment/Plan   Problem List Items Addressed This Visit       Coronary arteriosclerosis    Diabetes mellitus (CMS/HCC) - Primary    Relevant Orders    Comprehensive Metabolic Panel    Hemoglobin A1C    Lipid Panel    CBC and Auto Differential    Essential hypertension    Hyperlipidemia     Other Visit  Diagnoses       Aortic valve stenosis, etiology of cardiac valve disease unspecified              Cut down on insulin push fluids.  Refilled pts meds.  Monitor for worsening sx from aortic stenosis.  Fu in 3 mo

## 2023-09-06 ENCOUNTER — CLINICAL SUPPORT (OUTPATIENT)
Dept: PRIMARY CARE | Facility: CLINIC | Age: 78
End: 2023-09-06
Payer: MEDICARE

## 2023-09-06 DIAGNOSIS — Z23 NEED FOR IMMUNIZATION AGAINST INFLUENZA: ICD-10-CM

## 2023-09-06 PROCEDURE — 90662 IIV NO PRSV INCREASED AG IM: CPT | Performed by: FAMILY MEDICINE

## 2023-09-06 PROCEDURE — G0008 ADMIN INFLUENZA VIRUS VAC: HCPCS | Performed by: FAMILY MEDICINE

## 2023-10-24 ENCOUNTER — OFFICE VISIT (OUTPATIENT)
Dept: PRIMARY CARE | Facility: CLINIC | Age: 78
End: 2023-10-24
Payer: MEDICARE

## 2023-10-24 VITALS
TEMPERATURE: 96.9 F | SYSTOLIC BLOOD PRESSURE: 140 MMHG | BODY MASS INDEX: 35.08 KG/M2 | HEART RATE: 68 BPM | OXYGEN SATURATION: 97 % | WEIGHT: 241 LBS | DIASTOLIC BLOOD PRESSURE: 90 MMHG

## 2023-10-24 DIAGNOSIS — M77.11 LATERAL EPICONDYLITIS OF RIGHT ELBOW: Primary | ICD-10-CM

## 2023-10-24 PROCEDURE — 3077F SYST BP >= 140 MM HG: CPT | Performed by: FAMILY MEDICINE

## 2023-10-24 PROCEDURE — 1036F TOBACCO NON-USER: CPT | Performed by: FAMILY MEDICINE

## 2023-10-24 PROCEDURE — 1159F MED LIST DOCD IN RCRD: CPT | Performed by: FAMILY MEDICINE

## 2023-10-24 PROCEDURE — 99213 OFFICE O/P EST LOW 20 MIN: CPT | Performed by: FAMILY MEDICINE

## 2023-10-24 PROCEDURE — 3080F DIAST BP >= 90 MM HG: CPT | Performed by: FAMILY MEDICINE

## 2023-10-24 NOTE — PROGRESS NOTES
Subjective   Patient ID: Michael Dougherty is a 78 y.o. male who presents for pt c/o painful bump on right arm nki (X 1 month).  HPI  Pt reports pain on R elbow x 1 mo.  Sometimes swollen, but not all the time.   Has been doing more yardwork like raking leaves and such.  No fevers or chills.  Tender to palpation at times.    Patient Active Problem List   Diagnosis    Adverse effect of calcium-channel blocker    Atherosclerosis of aorta (CMS/HCC)    Cataracts, bilateral    Chondrocalcinosis of right knee    Coronary arteriosclerosis    Diabetes mellitus (CMS/HCC)    Gait abnormality    Heart murmur    Essential hypertension    Hyperglycemia due to type 2 diabetes mellitus (CMS/HCC)    Hyperlipidemia    Lesion of ulnar nerve, left upper limb    Localized primary osteoarthritis of carpometacarpal (CMC) joint of left wrist    Low back pain    Morbid obesity (CMS/HCC)    Nonadherence with dietary restriction    Stage 3a chronic kidney disease (CMS/HCC)       Social Connections: Not on file       Current Outpatient Medications on File Prior to Visit   Medication Sig Dispense Refill    aspirin 81 mg EC tablet Take 1 tablet (81 mg) by mouth once daily.      bisoprolol (Zebeta) 5 mg tablet Take 1 tablet (5 mg) by mouth once daily. 90 tablet 3    brimonidine (AlphaGAN P) 0.2 % ophthalmic solution Administer 1 drop into both eyes 2 times a day.      clotrimazole-betamethasone (Lotrisone) cream Apply 1 Application topically 2 times a day.      FreeStyle Lamine reader (FreeStyle Lamine 2 [place]) misc Use as instructed 1 each 0    FreeStyle Lamine sensor system (FreeStyle Lamine 2 Sensor) kit Use as instructed, every 2 weeks 1 each 0    hydroCHLOROthiazide (HYDRODiuril) 25 mg tablet Take 1 tablet (25 mg) by mouth once daily. 90 tablet 3    insulin detemir (Levemir FlexPen) 100 unit/mL (3 mL) pen Inject 35 Units under the skin 2 times a day. Take as directed per insulin instructions. 63 mL 3    losartan (Cozaar) 100 mg tablet Take 1 tablet  "(100 mg) by mouth once daily. 90 tablet 3    miconazole (AntifungaL Cream, miconazole,) 2 % cream Apply topically 2 times a day. 28 g 1    NovoLOG FlexPen U-100 Insulin 100 unit/mL (3 mL) pen Inject 35 Units under the skin 3 times a day with meals. Take as directed per insulin instructions. 94.5 mL 3    OneTouch Ultra Test strip USE 1 TEST STRIP 3 TIMES A DAY      pen needle, diabetic 32 gauge x 5/32\" needle To be used  each 3    rosuvastatin (Crestor) 40 mg tablet Take 1 tablet (40 mg) by mouth once daily at bedtime. 90 tablet 3    timolol (Timoptic) 0.5 % ophthalmic solution Administer 1 drop into both eyes 2 times a day.       No current facility-administered medications on file prior to visit.        Vitals:    10/24/23 1147   BP: 140/90   Pulse: 68   Temp: 36.1 °C (96.9 °F)   SpO2: 97%     Vitals:    10/24/23 1147   Weight: 109 kg (241 lb)       Review of Systems   All other systems reviewed and are negative.      Objective     Physical Exam  Musculoskeletal:      Comments: Tender to palpation at R lateral epicondyle         No visits with results within 2 Month(s) from this visit.   Latest known visit with results is:   Lab on 08/16/2023   Component Date Value Ref Range Status    Hemoglobin A1C 08/16/2023 5.9 (A)  % Final         Diagnosis of Diabetes-Adults   Non-Diabetic: < or = 5.6%   Increased risk for developing diabetes: 5.7-6.4%   Diagnostic of diabetes: > or = 6.5%  .       Monitoring of Diabetes                Age (y)     Therapeutic Goal (%)   Adults:          >18           <7.0   Pediatrics:    13-18           <7.5                   7-12           <8.0                   0- 6            7.5-8.5   American Diabetes Association. Diabetes Care 33(S1), Jan 2010.    Estimated Average Glucose 08/16/2023 123  MG/DL Final    Glucose 08/16/2023 167 (H)  74 - 99 mg/dL Final    Sodium 08/16/2023 139  136 - 145 mmol/L Final    Potassium 08/16/2023 4.0  3.5 - 5.3 mmol/L Final    Chloride 08/16/2023 104  98 " - 107 mmol/L Final    Bicarbonate 08/16/2023 28  21 - 32 mmol/L Final    Anion Gap 08/16/2023 11  10 - 20 mmol/L Final    Urea Nitrogen 08/16/2023 38 (H)  6 - 23 mg/dL Final    Creatinine 08/16/2023 1.49 (H)  0.50 - 1.30 mg/dL Final    GFR MALE 08/16/2023 48 (A)  >90 mL/min/1.73m2 Final     CALCULATIONS OF ESTIMATED GFR ARE PERFORMED   USING THE 2021 CKD-EPI STUDY REFIT EQUATION   WITHOUT THE RACE VARIABLE FOR THE IDMS-TRACEABLE   CREATININE METHODS.    https://jasn.asnjournals.org/content/early/2021/09/22/ASN.5069279006    Calcium 08/16/2023 9.2  8.6 - 10.3 mg/dL Final    Albumin 08/16/2023 4.1  3.4 - 5.0 g/dL Final    Alkaline Phosphatase 08/16/2023 54  33 - 136 U/L Final    Total Protein 08/16/2023 6.2 (L)  6.4 - 8.2 g/dL Final    AST 08/16/2023 18  9 - 39 U/L Final    Total Bilirubin 08/16/2023 0.8  0.0 - 1.2 mg/dL Final    ALT (SGPT) 08/16/2023 17  10 - 52 U/L Final     Patients treated with Sulfasalazine may generate    falsely decreased results for ALT.    WBC 08/16/2023 13.5 (H)  4.4 - 11.3 x10E9/L Final    RBC 08/16/2023 4.30 (L)  4.50 - 5.90 x10E12/L Final    Hemoglobin 08/16/2023 11.9 (L)  13.5 - 17.5 g/dL Final    Hematocrit 08/16/2023 36.6 (L)  41.0 - 52.0 % Final    MCV 08/16/2023 85  80 - 100 fL Final    MCHC 08/16/2023 32.5  32.0 - 36.0 g/dL Final    Platelets 08/16/2023 127 (L)  150 - 450 x10E9/L Final    RDW 08/16/2023 16.0 (H)  11.5 - 14.5 % Final    Neutrophils % 08/16/2023 49.4  40.0 - 80.0 % Final    Immature Granulocytes %, Automated 08/16/2023 0.4  0.0 - 0.9 % Final     Immature Granulocyte Count (IG) includes promyelocytes,    myelocytes and metamyelocytes but does not include bands.   Percent differential counts (%) should be interpreted in the   context of the absolute cell counts (cells/L).    Lymphocytes % 08/16/2023 43.1  13.0 - 44.0 % Final    Monocytes % 08/16/2023 5.9  2.0 - 10.0 % Final    Eosinophils % 08/16/2023 0.8  0.0 - 6.0 % Final    Basophils % 08/16/2023 0.4  0.0 - 2.0 %  Final    Neutrophils Absolute 08/16/2023 6.66 (H)  1.60 - 5.50 x10E9/L Final    Lymphocytes Absolute 08/16/2023 5.82 (H)  0.80 - 3.00 x10E9/L Final    Monocytes Absolute 08/16/2023 0.80  0.05 - 0.80 x10E9/L Final    Eosinophils Absolute 08/16/2023 0.11  0.00 - 0.40 x10E9/L Final    Basophils Absolute 08/16/2023 0.06  0.00 - 0.10 x10E9/L Final    Iron 08/16/2023 54  35 - 150 ug/dL Final    TIBC 08/16/2023 286  240 - 445 ug/dL Final    Iron Saturation 08/16/2023 19 (L)  25 - 45 % Final    Ferritin 08/16/2023 376 (H)  20 - 300 ug/L Final    Vitamin B-12 08/16/2023 974 (H)  211 - 911 pg/mL Final    RBC Morphology 08/16/2023 SEE COMMENT   Final    NO SIGNIFICANT RBC ABNORMALITIES SEEN ON  SMEAR REVIEW.       Assessment/Plan   Problem List Items Addressed This Visit    None  Visit Diagnoses         Codes    Lateral epicondylitis of right elbow    -  Primary M77.11           Encouraged rest.  Can try Volteran gel.  Try PT exercises.  If not getting better ref to ortho for shot.

## 2023-11-13 ENCOUNTER — OFFICE VISIT (OUTPATIENT)
Dept: ORTHOPEDIC SURGERY | Facility: CLINIC | Age: 78
End: 2023-11-13
Payer: MEDICARE

## 2023-11-13 DIAGNOSIS — M19.032 LOCALIZED PRIMARY OSTEOARTHRITIS OF CARPOMETACARPAL (CMC) JOINT OF LEFT WRIST: Primary | ICD-10-CM

## 2023-11-13 PROCEDURE — 3080F DIAST BP >= 90 MM HG: CPT | Performed by: ORTHOPAEDIC SURGERY

## 2023-11-13 PROCEDURE — 1159F MED LIST DOCD IN RCRD: CPT | Performed by: ORTHOPAEDIC SURGERY

## 2023-11-13 PROCEDURE — 99214 OFFICE O/P EST MOD 30 MIN: CPT | Performed by: ORTHOPAEDIC SURGERY

## 2023-11-13 PROCEDURE — 20600 DRAIN/INJ JOINT/BURSA W/O US: CPT | Performed by: ORTHOPAEDIC SURGERY

## 2023-11-13 PROCEDURE — 1160F RVW MEDS BY RX/DR IN RCRD: CPT | Performed by: ORTHOPAEDIC SURGERY

## 2023-11-13 PROCEDURE — 3077F SYST BP >= 140 MM HG: CPT | Performed by: ORTHOPAEDIC SURGERY

## 2023-11-13 PROCEDURE — 1126F AMNT PAIN NOTED NONE PRSNT: CPT | Performed by: ORTHOPAEDIC SURGERY

## 2023-11-13 PROCEDURE — 1036F TOBACCO NON-USER: CPT | Performed by: ORTHOPAEDIC SURGERY

## 2023-11-13 NOTE — PROGRESS NOTES
4 month follow up Left CMC injections last one 7/27/2023 - Patient would like another injection - no new injury

## 2023-11-13 NOTE — PROGRESS NOTES
78 y.o. male presents today for follow up left base of thumb pain. The patient reports symptoms for years, relieved with shots, last one about 4 months ago. Pain is controlled. Patient reports no numbness and tingling.  Reports no previous surgeries or trauma to the area.  Reports pain worse with use, better at rest.  Pain dull ache, sharp at times. Would like another shot.     Review of Systems    Constitutional: no fever, no chills, not feeling tired, no recent weight gain and no recent weight loss.   ENT: no nosebleeds.   Cardiovascular: no chest pain.   Respiratory: no shortness of breath and no cough.   Gastrointestinal: no abdominal pain, no nausea, no vomiting and no diarrhea.   Musculoskeletal: per HPI  Integumentary: no rashes and no skin wound.   Neurological: no headache.   Psychiatric: no depression and no sleep disturbances.   Endocrine: no muscle weakness and no muscle cramps.   Hematologic/Lymphatic: no swollen glands and no tendency for easy bruising.     All other systems have been reviewed and are negative for complaint    Patient's past medical history, past surgical history, allergies, and medications have been reviewed unless otherwise noted in the chart.     CMC Arthritis Exam  Inspection:  no evidence of infection, no edema, no erythema, no ecchymosis, Palpation:  compartments are soft, pain with palpation over the Thumb CMC joint, Range of Motion:  full wrist and finger range of motion, Stability:  no wrist or finger instability detected, Strength:  5/5  and pinch strength, Skin:  intact, Vascular:  capillary refill <2 seconds distally, Sensation:  intact to light touch distally, Tests:  negative Finkelstein's , positive Thumb CMC Grind.      Constitutional   General appearance: Alert and in no acute distress. Well developed, well nourished.    Eyes   External Eye, Conjunctiva and lids: Normal external exam - pupils were equal in size, round, reactive to light (PERRL) with normal  accommodation and extraocular movements intact (EOMI).   Ears, Nose, Mouth, and Throat   Hearing: Normal.   Neck   Neck: No neck mass was observed. Supple.   Pulmonary   Respiratory effort: No respiratory distress.   Cardiovascular   Examination of extremities: No peripheral edema.   Abdomen   Abdomen: Soft nontender; no abdominal mass palpated.. No rebound, rigidity or guarding.    Skin   Skin and subcutaneous tissue: Normal skin color and pigmentation, normal skin turgor, and no rash.   Neurologic   Sensation: Normal.   Psychiatric   Judgment and insight: Intact.   Orientation to person, place, and time: Alert and oriented x 3.       Mood and affect: Normal.      Left thumb CMC DJD  I discussed the diagnosis and treatment options with the patient today along with their associated risks and benefits. After thorough discussion, the patient has elected to proceed with conservative management. All questions were answered to the patients satisfaction who seems satisfied with the plan.      Discussion I discussed the diagnosis and treatment options with the patient today along with their associated risks and benefits. After thorough discussion, the patient has elected to proceed with a corticosteroid injection with Kenalog and Xylocaine, which was performed in the office today under aseptic technique and the patient tolerated the procedure well.          Ortho Injections:           Injection site left thumb CMC. Medication 1 cc 1% Xylocaine, 1 cc 40 mg Kenalog, Injection given under sterile conditions. No immediate complications noted. Post injection instructions given.  FU 4 months prn - no XR

## 2023-11-14 ENCOUNTER — LAB (OUTPATIENT)
Dept: LAB | Facility: LAB | Age: 78
End: 2023-11-14
Payer: MEDICARE

## 2023-11-14 DIAGNOSIS — Z79.4 TYPE 2 DIABETES MELLITUS WITHOUT COMPLICATION, WITH LONG-TERM CURRENT USE OF INSULIN (MULTI): ICD-10-CM

## 2023-11-14 DIAGNOSIS — E11.9 TYPE 2 DIABETES MELLITUS WITHOUT COMPLICATION, WITH LONG-TERM CURRENT USE OF INSULIN (MULTI): ICD-10-CM

## 2023-11-14 LAB
ALBUMIN SERPL BCP-MCNC: 4.2 G/DL (ref 3.4–5)
ALP SERPL-CCNC: 64 U/L (ref 33–136)
ALT SERPL W P-5'-P-CCNC: 16 U/L (ref 10–52)
ANION GAP SERPL CALC-SCNC: 13 MMOL/L (ref 10–20)
AST SERPL W P-5'-P-CCNC: 18 U/L (ref 9–39)
BASOPHILS # BLD AUTO: 0.03 X10*3/UL (ref 0–0.1)
BASOPHILS NFR BLD AUTO: 0.2 %
BILIRUB SERPL-MCNC: 0.8 MG/DL (ref 0–1.2)
BUN SERPL-MCNC: 35 MG/DL (ref 6–23)
CALCIUM SERPL-MCNC: 9.5 MG/DL (ref 8.6–10.3)
CHLORIDE SERPL-SCNC: 105 MMOL/L (ref 98–107)
CHOLEST SERPL-MCNC: 118 MG/DL (ref 0–199)
CHOLESTEROL/HDL RATIO: 3.4
CO2 SERPL-SCNC: 25 MMOL/L (ref 21–32)
CREAT SERPL-MCNC: 1.4 MG/DL (ref 0.5–1.3)
EOSINOPHIL # BLD AUTO: 0 X10*3/UL (ref 0–0.4)
EOSINOPHIL NFR BLD AUTO: 0 %
ERYTHROCYTE [DISTWIDTH] IN BLOOD BY AUTOMATED COUNT: 15.4 % (ref 11.5–14.5)
EST. AVERAGE GLUCOSE BLD GHB EST-MCNC: 108 MG/DL
GFR SERPL CREATININE-BSD FRML MDRD: 52 ML/MIN/1.73M*2
GLUCOSE SERPL-MCNC: 179 MG/DL (ref 74–99)
HBA1C MFR BLD: 5.4 %
HCT VFR BLD AUTO: 35.6 % (ref 41–52)
HDLC SERPL-MCNC: 34.5 MG/DL
HGB BLD-MCNC: 11.6 G/DL (ref 13.5–17.5)
IMM GRANULOCYTES # BLD AUTO: 0.05 X10*3/UL (ref 0–0.5)
IMM GRANULOCYTES NFR BLD AUTO: 0.4 % (ref 0–0.9)
LDLC SERPL CALC-MCNC: 63 MG/DL
LYMPHOCYTES # BLD AUTO: 3.45 X10*3/UL (ref 0.8–3)
LYMPHOCYTES NFR BLD AUTO: 25.9 %
MCH RBC QN AUTO: 27.6 PG (ref 26–34)
MCHC RBC AUTO-ENTMCNC: 32.6 G/DL (ref 32–36)
MCV RBC AUTO: 85 FL (ref 80–100)
MONOCYTES # BLD AUTO: 0.64 X10*3/UL (ref 0.05–0.8)
MONOCYTES NFR BLD AUTO: 4.8 %
NEUTROPHILS # BLD AUTO: 9.13 X10*3/UL (ref 1.6–5.5)
NEUTROPHILS NFR BLD AUTO: 68.7 %
NON HDL CHOLESTEROL: 84 MG/DL (ref 0–149)
NRBC BLD-RTO: 0 /100 WBCS (ref 0–0)
PLATELET # BLD AUTO: 134 X10*3/UL (ref 150–450)
POTASSIUM SERPL-SCNC: 4.2 MMOL/L (ref 3.5–5.3)
PROT SERPL-MCNC: 6.2 G/DL (ref 6.4–8.2)
RBC # BLD AUTO: 4.21 X10*6/UL (ref 4.5–5.9)
SODIUM SERPL-SCNC: 139 MMOL/L (ref 136–145)
TRIGL SERPL-MCNC: 104 MG/DL (ref 0–149)
VLDL: 21 MG/DL (ref 0–40)
WBC # BLD AUTO: 13.3 X10*3/UL (ref 4.4–11.3)

## 2023-11-14 PROCEDURE — 80053 COMPREHEN METABOLIC PANEL: CPT

## 2023-11-14 PROCEDURE — 80061 LIPID PANEL: CPT

## 2023-11-14 PROCEDURE — 85025 COMPLETE CBC W/AUTO DIFF WBC: CPT

## 2023-11-14 PROCEDURE — 36415 COLL VENOUS BLD VENIPUNCTURE: CPT

## 2023-11-14 PROCEDURE — 83036 HEMOGLOBIN GLYCOSYLATED A1C: CPT

## 2023-11-15 DIAGNOSIS — E11.9 TYPE 2 DIABETES MELLITUS WITHOUT COMPLICATION, WITH LONG-TERM CURRENT USE OF INSULIN (MULTI): Primary | ICD-10-CM

## 2023-11-15 DIAGNOSIS — Z79.4 TYPE 2 DIABETES MELLITUS WITHOUT COMPLICATION, WITH LONG-TERM CURRENT USE OF INSULIN (MULTI): Primary | ICD-10-CM

## 2023-11-15 RX ORDER — BLOOD SUGAR DIAGNOSTIC
STRIP MISCELLANEOUS
Qty: 300 STRIP | Refills: 3 | Status: SHIPPED | OUTPATIENT
Start: 2023-11-15

## 2023-11-16 ENCOUNTER — APPOINTMENT (OUTPATIENT)
Dept: PRIMARY CARE | Facility: CLINIC | Age: 78
End: 2023-11-16
Payer: MEDICARE

## 2023-11-21 ENCOUNTER — OFFICE VISIT (OUTPATIENT)
Dept: PRIMARY CARE | Facility: CLINIC | Age: 78
End: 2023-11-21
Payer: MEDICARE

## 2023-11-21 VITALS
WEIGHT: 237 LBS | TEMPERATURE: 97.6 F | HEIGHT: 70 IN | HEART RATE: 54 BPM | OXYGEN SATURATION: 99 % | BODY MASS INDEX: 33.93 KG/M2 | SYSTOLIC BLOOD PRESSURE: 124 MMHG | DIASTOLIC BLOOD PRESSURE: 68 MMHG

## 2023-11-21 DIAGNOSIS — Z12.5 SCREENING FOR PROSTATE CANCER: ICD-10-CM

## 2023-11-21 DIAGNOSIS — I10 ESSENTIAL HYPERTENSION: ICD-10-CM

## 2023-11-21 DIAGNOSIS — I25.10 CORONARY ARTERIOSCLEROSIS: ICD-10-CM

## 2023-11-21 DIAGNOSIS — Z13.6 SCREENING FOR HEART DISEASE: ICD-10-CM

## 2023-11-21 DIAGNOSIS — E78.2 MIXED HYPERLIPIDEMIA: ICD-10-CM

## 2023-11-21 DIAGNOSIS — E66.01 MORBID OBESITY (MULTI): ICD-10-CM

## 2023-11-21 DIAGNOSIS — I35.0 AORTIC VALVE STENOSIS, ETIOLOGY OF CARDIAC VALVE DISEASE UNSPECIFIED: ICD-10-CM

## 2023-11-21 DIAGNOSIS — E11.9 TYPE 2 DIABETES MELLITUS WITHOUT COMPLICATION, WITH LONG-TERM CURRENT USE OF INSULIN (MULTI): ICD-10-CM

## 2023-11-21 DIAGNOSIS — Z00.00 MEDICARE ANNUAL WELLNESS VISIT, SUBSEQUENT: ICD-10-CM

## 2023-11-21 DIAGNOSIS — Z00.00 ROUTINE GENERAL MEDICAL EXAMINATION AT HEALTH CARE FACILITY: Primary | ICD-10-CM

## 2023-11-21 DIAGNOSIS — Z79.4 TYPE 2 DIABETES MELLITUS WITHOUT COMPLICATION, WITH LONG-TERM CURRENT USE OF INSULIN (MULTI): ICD-10-CM

## 2023-11-21 DIAGNOSIS — N18.31 STAGE 3A CHRONIC KIDNEY DISEASE (MULTI): ICD-10-CM

## 2023-11-21 DIAGNOSIS — D72.829 LEUKOCYTOSIS, UNSPECIFIED TYPE: ICD-10-CM

## 2023-11-21 DIAGNOSIS — F03.90 DEMENTIA WITHOUT BEHAVIORAL DISTURBANCE, PSYCHOTIC DISTURBANCE, MOOD DISTURBANCE, OR ANXIETY, UNSPECIFIED DEMENTIA SEVERITY, UNSPECIFIED DEMENTIA TYPE (MULTI): ICD-10-CM

## 2023-11-21 DIAGNOSIS — J30.9 CHRONIC ALLERGIC RHINITIS: ICD-10-CM

## 2023-11-21 PROCEDURE — 3078F DIAST BP <80 MM HG: CPT | Performed by: FAMILY MEDICINE

## 2023-11-21 PROCEDURE — 99214 OFFICE O/P EST MOD 30 MIN: CPT | Performed by: FAMILY MEDICINE

## 2023-11-21 PROCEDURE — G0439 PPPS, SUBSEQ VISIT: HCPCS | Performed by: FAMILY MEDICINE

## 2023-11-21 PROCEDURE — 1159F MED LIST DOCD IN RCRD: CPT | Performed by: FAMILY MEDICINE

## 2023-11-21 PROCEDURE — 1036F TOBACCO NON-USER: CPT | Performed by: FAMILY MEDICINE

## 2023-11-21 PROCEDURE — 1170F FXNL STATUS ASSESSED: CPT | Performed by: FAMILY MEDICINE

## 2023-11-21 PROCEDURE — 3074F SYST BP LT 130 MM HG: CPT | Performed by: FAMILY MEDICINE

## 2023-11-21 PROCEDURE — 1160F RVW MEDS BY RX/DR IN RCRD: CPT | Performed by: FAMILY MEDICINE

## 2023-11-21 RX ORDER — FLUTICASONE PROPIONATE 50 MCG
2 SPRAY, SUSPENSION (ML) NASAL DAILY
Qty: 16 G | Refills: 5 | Status: SHIPPED | OUTPATIENT
Start: 2023-11-21 | End: 2024-11-20

## 2023-11-21 RX ORDER — PREDNISOLONE ACETATE 10 MG/ML
1 SUSPENSION/ DROPS OPHTHALMIC 4 TIMES DAILY
COMMUNITY
Start: 2023-09-18 | End: 2023-12-14 | Stop reason: WASHOUT

## 2023-11-21 RX ORDER — DONEPEZIL HYDROCHLORIDE 5 MG/1
5 TABLET, FILM COATED ORAL NIGHTLY
Qty: 30 TABLET | Refills: 11 | Status: SHIPPED | OUTPATIENT
Start: 2023-11-21 | End: 2024-03-21 | Stop reason: SDUPTHER

## 2023-11-21 RX ORDER — INSULIN GLARGINE 100 [IU]/ML
35 INJECTION, SOLUTION SUBCUTANEOUS NIGHTLY
Qty: 31.5 ML | Refills: 3 | Status: SHIPPED | OUTPATIENT
Start: 2023-11-21 | End: 2024-02-01 | Stop reason: SDUPTHER

## 2023-11-21 ASSESSMENT — ACTIVITIES OF DAILY LIVING (ADL)
DOING_HOUSEWORK: INDEPENDENT
TAKING_MEDICATION: TOTAL CARE
MANAGING_FINANCES: TOTAL CARE
DRESSING: INDEPENDENT
BATHING: INDEPENDENT
GROCERY_SHOPPING: NEEDS ASSISTANCE

## 2023-11-21 ASSESSMENT — PATIENT HEALTH QUESTIONNAIRE - PHQ9
2. FEELING DOWN, DEPRESSED OR HOPELESS: NOT AT ALL
1. LITTLE INTEREST OR PLEASURE IN DOING THINGS: NOT AT ALL
SUM OF ALL RESPONSES TO PHQ9 QUESTIONS 1 AND 2: 0

## 2023-11-21 NOTE — PROGRESS NOTES
Subjective   Reason for Visit: Michael Dougherty is an 78 y.o. male here for a Medicare Wellness visit.     Past Medical, Surgical, and Family History reviewed and updated in chart.    Reviewed all medications by prescribing practitioner or clinical pharmacist (such as prescriptions, OTCs, herbal therapies and supplements) and documented in the medical record.    HPI    HPI: Annual Wellness visit. The patient has not felt down, felt depressed, felt hopeless or had little interest or pleasure in doing things over the past 6 weeks. States difficulty with driving and managing money, but no trouble with bathing, dressing, eating, with mobility, toileting, house work, medications, shopping or transportation. Reports no falls. The home has grab bars in the bathroom and handrails on the stairs. The home does not have poor lighting or rugs in the hallway, clutter, uneven floors Denies hearing difficulty in the ears bilaterally. No diet restrictions.     HTN: well controlled    DM2: a1c was 6.0 this time which is an improvement.    CRI: kidneys looked worse on this blood draw.    Dementia: has been having worsening cognition in recent years.  Forgets tasks, directions.  Needs more help doing finances.  Mother had Alzheimers.      Preparing meals - independent  Using telephone - independent  Bladder - continent,  Bowel - continent    Opioid use - no  Exercise -    no but states he is active.   Diet - noncompliant.  Eats sweats and carbs  Pain score - zero    Providers  Podiatry - Dr. Polanco - 7-2020  Kyree - Eleaonr - has appt 9-2020    Counseled pt on living will: has living will    AAA screening: NA     Prostate CA screen: PSA check    CV screening: CA score ordered    Colonoscopy: due in 2026, had a number of larger polyps    Diabetes screening:  treated    Glaucoma screen: sees optho    CT chest tob screen: NA    Vaccines:       Patient Care Team:  Ulices Chaudhary MD as PCP - General  Ulices Chaudhary MD as PCP - [facility]  "Attributed Provider  Lencho Soliman as Consulting Physician (Orthopaedic Surgery)     Review of Systems   All other systems reviewed and are negative.      Objective   Vitals:  /68   Pulse 54   Temp 36.4 °C (97.6 °F)   Ht 1.778 m (5' 10\")   Wt 108 kg (237 lb)   SpO2 99%   BMI 34.01 kg/m²       Physical Exam  Vitals reviewed.   Constitutional:       General: He is not in acute distress.     Appearance: Normal appearance. He is well-developed. He is not diaphoretic.   HENT:      Head: Normocephalic and atraumatic.      Right Ear: Tympanic membrane normal.      Left Ear: Tympanic membrane normal.      Nose: Nose normal.      Mouth/Throat:      Mouth: Mucous membranes are moist.   Eyes:      Pupils: Pupils are equal, round, and reactive to light.   Cardiovascular:      Rate and Rhythm: Normal rate and regular rhythm.      Heart sounds: Normal heart sounds. No murmur heard.     No friction rub. No gallop.   Pulmonary:      Effort: Pulmonary effort is normal.      Breath sounds: Normal breath sounds. No rales.   Abdominal:      General: Bowel sounds are normal.      Palpations: Abdomen is soft.      Tenderness: There is no abdominal tenderness.   Musculoskeletal:      Cervical back: Normal range of motion and neck supple.   Skin:     General: Skin is warm and dry.   Neurological:      Mental Status: He is alert.   Psychiatric:         Mood and Affect: Mood normal.         Assessment/Plan   Problem List Items Addressed This Visit       Coronary arteriosclerosis    Overview     Note: fe         Diabetes mellitus (CMS/HCC)    Overview     Note: ak         Relevant Medications    insulin glargine (Basaglar KwikPen U-100 Insulin) 100 unit/mL (3 mL) pen    Essential hypertension    Overview     Note: fe         Hyperlipidemia    Overview     Note: FE         Morbid obesity (CMS/HCC)    Stage 3a chronic kidney disease (CMS/HCC)     Other Visit Diagnoses       Routine general medical examination at [facility] " facility    -  Primary    Leukocytosis, unspecified type        Relevant Orders    Referral to Hematology and Oncology    Dementia without behavioral disturbance, psychotic disturbance, mood disturbance, or anxiety, unspecified dementia severity, unspecified dementia type (CMS/[facility])        Relevant Medications    donepezil (Aricept) 5 mg tablet    Chronic allergic rhinitis        Relevant Medications    fluticasone (Flonase) 50 mcg/actuation nasal spray    Screening for heart disease        Relevant Orders    CT cardiac scoring wo IV contrast    Medicare annual wellness visit, subsequent        Aortic valve stenosis, etiology of cardiac valve disease unspecified              Doing well.  Refilled meds.  Follow up in 4 mo.  Continue weight loss.  Try flonase for sinuses.  Ref to hematology for elevated WBC.

## 2023-11-29 NOTE — PROGRESS NOTES
Chief Complaint/Reason for Visit:   Patient is coming in today as a 6 month Cardiovascular follow up.        History Of Present Illness:    Mr. Dougherty is coming today as a 6-month cardiovascular follow-up.  We have followed this patient previously for hypertension, hyperlipidemia, and aortic valve stenosis.  He also has a past medical history significant for diabetes, and early dementia.    Patient presents today accompanied by his wife.He reports no cardiac symptoms.  Patient denies chest pain, pressure, palpitations, orthopnea, and edema.  He has been having some issues with weight loss and mildly reduced appetite.  He has been taking his medications as prescribed.    Past Medical History:  He has no past medical history on file.    Past Surgical History:  He has a past surgical history that includes Hand surgery (12/04/2014); Other surgical history (06/10/2021); Other surgical history (06/10/2021); Other surgical history (06/10/2021); Other surgical history (06/10/2021); Other surgical history (06/10/2021); and Other surgical history (06/10/2021).      Social History:  He reports that he has quit smoking. His smoking use included cigarettes. He smoked an average of 1 pack per day. He has never used smokeless tobacco. He reports that he does not drink alcohol and does not use drugs.    Family History:  Family History   Problem Relation Name Age of Onset    Diabetes Mother      Hypertension Father      Diabetes Father      Coronary artery disease Sister      Diabetes Brother      Melanoma Brother          Allergies:  Ezetimibe, Fenofibrate, Niacin, and Doxycycline    Medications:  Current Outpatient Medications   Medication Instructions    aspirin 81 mg, oral, Daily    B complex-vitamin C-folic acid (Nephro-Anneliese) 0.8 mg tablet 0.8 mg, oral, Daily    bisoprolol (ZEBETA) 5 mg, oral, Daily    brimonidine (AlphaGAN P) 0.2 % ophthalmic solution 1 drop, Both Eyes, 2 times daily    donepezil (ARICEPT) 5 mg, oral, Nightly     "fluticasone (Flonase) 50 mcg/actuation nasal spray 2 sprays, Each Nostril, Daily, Shake gently. Before first use, prime pump. After use, clean tip and replace cap.    FreeStyle Lamine reader (FreeStyle Lamine 2 [place]) misc Use as instructed    FreeStyle Lamine sensor system (FreeStyle Lamine 2 Sensor) kit Use as instructed, every 2 weeks    hydroCHLOROthiazide (HYDRODIURIL) 25 mg, oral, Daily    insulin glargine (BASAGLAR KWIKPEN U-100 INSULIN) 35 Units, subcutaneous, Nightly, Take as directed per insulin instructions.    losartan (COZAAR) 100 mg, oral, Daily    NovoLOG Flexpen U-100 Insulin 35 Units, subcutaneous, 3 times daily with meals, Take as directed per insulin instructions.    OneTouch Ultra Test strip USE 1 TEST STRIP 3 TIMES A DAY    pen needle, diabetic 32 gauge x 5/32\" needle To be used TID    rosuvastatin (CRESTOR) 40 mg, oral, Nightly    timolol (Timoptic) 0.5 % ophthalmic solution 1 drop, Both Eyes, 2 times daily       Review of Systems:  Review of Systems   Constitutional: Positive for decreased appetite and weight loss. Negative for malaise/fatigue.   HENT: Negative.     Eyes:  Negative for blurred vision and visual disturbance.   Cardiovascular:  Negative for chest pain, dyspnea on exertion, irregular heartbeat, leg swelling, orthopnea, palpitations and syncope.   Respiratory: Negative.  Negative for cough and shortness of breath.    Musculoskeletal:  Negative for arthritis and falls.   Gastrointestinal: Negative.    Neurological:  Negative for focal weakness and light-headedness.   Psychiatric/Behavioral:  Negative for depression and memory loss.         Vitals  Visit Vitals  /58   Pulse 56   Wt 108 kg (239 lb)   BMI 34.29 kg/m²   Smoking Status Former   BSA 2.31 m²        Physical Exam:  Constitutional: alert and in no acute distress.   Pulmonary: no increased work of breathing or signs of respiratory distress ~and~lungs clear to auscultation.    Cardiovascular: ,~JVP was normal,~~Regular " rate and rhythm, 3 out of 6 systolic ejection murmur radiating to bilateral carotids, absent S2),~pedal pulses 2+ bilaterally, trace LE edema  Abdomen: ~and~Soft, non tender, normal bowel sounds.   Psychiatric judgment and insight is normal ,~oriented to person, place and time ~and~normal mood and affect .      Last Labs:  CBC -  Lab Results   Component Value Date    WBC 13.3 (H) 11/14/2023    HGB 11.6 (L) 11/14/2023    HCT 35.6 (L) 11/14/2023    MCV 85 11/14/2023     (L) 11/14/2023     Lab Results   Component Value Date    GLUCOSE 179 (H) 11/14/2023    CALCIUM 9.5 11/14/2023     11/14/2023    K 4.2 11/14/2023    CO2 25 11/14/2023     11/14/2023    BUN 35 (H) 11/14/2023    CREATININE 1.40 (H) 11/14/2023      CMP -  Lab Results   Component Value Date    CALCIUM 9.5 11/14/2023    PROT 6.2 (L) 11/14/2023    ALBUMIN 4.2 11/14/2023    AST 18 11/14/2023    ALT 16 11/14/2023    ALKPHOS 64 11/14/2023    BILITOT 0.8 11/14/2023       LIPID PANEL -   Lab Results   Component Value Date    CHOL 118 11/14/2023    TRIG 104 11/14/2023    HDL 34.5 11/14/2023    CHHDL 3.4 11/14/2023    LDLF 45 05/27/2023    VLDL 21 11/14/2023    NHDL 84 11/14/2023       Lab Results   Component Value Date    HGBA1C 5.4 11/14/2023       Last Cardiology Tests:    Echo:6-21-23  CONCLUSIONS:  1. Left ventricular systolic function is normal with a 60-65% estimated ejection fraction.  2. Spectral Doppler shows an impaired relaxation pattern of left ventricular diastolic filling.  3. There are elevated left atrial and left ventricular end diastolic pressures.  4. Moderately enlarged right ventricle.  5. The left atrium is severely dilated.  6. Severe aortic valve stenosis.      Lab review: I have personally reviewed the laboratory result(s)     Assessment/Plan:  Aortic valve stenosis: Echocardiogram in June, 2023 showed severe aortic valve stenosis, EF 60-65%.  Patient has been asymptomatic.  He will have a repeat echocardiogram in June  and follow-up with Dr. Manzo in July.  We did discuss symptoms to watch for and plan for referral to the valve team in the future.    Hypertension: Blood pressure today is well-controlled at 128/58.  Patient will continue on hydrochlorothiazide 25 mg daily and losartan 100 mg daily.  I encouraged him to be on a heart healthy low-sodium diet.    Hyperlipidemia: Patient currently is on rosuvastatin 40 mg nightly.  Lipid labs from November show triglycerides 104, LDL 45.  Patient will continue on his current dose.    Patient instructed to call with any cardiovascular complaints. All questions were answered.       Dragon dictation was utilized to create this document. Quite often unanticipated grammatical, syntax,  and other interpretive errors are inadvertently transcribed by the computer software.  Please disregard these errors.  Please excuse any errors that have escaped final proofreading.             Gabriella Miller, APRN-CNP

## 2023-11-30 ENCOUNTER — TELEPHONE (OUTPATIENT)
Dept: PRIMARY CARE | Facility: CLINIC | Age: 78
End: 2023-11-30
Payer: MEDICARE

## 2023-11-30 NOTE — TELEPHONE ENCOUNTER
Wife called and states the soonest they could get pt an appt with the hematology office in [place] was 3/4/23.  Wants to know if ok to wait until then?    States the scheduling dept said they may be able to get pt in sooner at [facility] but wife states that is really too far for them and they do not want to go there if they do not have to

## 2023-12-04 PROBLEM — H90.3 SENSORINEURAL HEARING LOSS (SNHL) OF BOTH EARS: Status: ACTIVE | Noted: 2023-12-04

## 2023-12-11 ENCOUNTER — APPOINTMENT (OUTPATIENT)
Dept: ORTHOPEDIC SURGERY | Facility: CLINIC | Age: 78
End: 2023-12-11
Payer: MEDICARE

## 2023-12-14 ENCOUNTER — OFFICE VISIT (OUTPATIENT)
Dept: CARDIOLOGY | Facility: CLINIC | Age: 78
End: 2023-12-14
Payer: MEDICARE

## 2023-12-14 VITALS
WEIGHT: 239 LBS | HEART RATE: 56 BPM | SYSTOLIC BLOOD PRESSURE: 128 MMHG | DIASTOLIC BLOOD PRESSURE: 58 MMHG | BODY MASS INDEX: 34.29 KG/M2

## 2023-12-14 DIAGNOSIS — I10 ESSENTIAL HYPERTENSION: ICD-10-CM

## 2023-12-14 DIAGNOSIS — E78.5 HYPERLIPIDEMIA, UNSPECIFIED HYPERLIPIDEMIA TYPE: ICD-10-CM

## 2023-12-14 DIAGNOSIS — I70.0 ATHEROSCLEROSIS OF AORTA (CMS-HCC): Primary | ICD-10-CM

## 2023-12-14 PROCEDURE — 3074F SYST BP LT 130 MM HG: CPT | Performed by: NURSE PRACTITIONER

## 2023-12-14 PROCEDURE — 99214 OFFICE O/P EST MOD 30 MIN: CPT | Performed by: NURSE PRACTITIONER

## 2023-12-14 PROCEDURE — 1160F RVW MEDS BY RX/DR IN RCRD: CPT | Performed by: NURSE PRACTITIONER

## 2023-12-14 PROCEDURE — 1159F MED LIST DOCD IN RCRD: CPT | Performed by: NURSE PRACTITIONER

## 2023-12-14 PROCEDURE — 1036F TOBACCO NON-USER: CPT | Performed by: NURSE PRACTITIONER

## 2023-12-14 PROCEDURE — 3078F DIAST BP <80 MM HG: CPT | Performed by: NURSE PRACTITIONER

## 2023-12-14 ASSESSMENT — ENCOUNTER SYMPTOMS
FALLS: 0
DEPRESSION: 0
BLURRED VISION: 0
MEMORY LOSS: 0
GASTROINTESTINAL NEGATIVE: 1
RESPIRATORY NEGATIVE: 1
DYSPNEA ON EXERTION: 0
DECREASED APPETITE: 1
ORTHOPNEA: 0
PALPITATIONS: 0
COUGH: 0
WEIGHT LOSS: 1
LIGHT-HEADEDNESS: 0
SHORTNESS OF BREATH: 0
IRREGULAR HEARTBEAT: 0
SYNCOPE: 0
FOCAL WEAKNESS: 0

## 2023-12-14 NOTE — PATIENT INSTRUCTIONS
Continue on current meds  Heart healthy, low sodium diet  Mediterranean diet is recommended  An echo is set up in June  Follow up with Dr Mnazo in July as planned.

## 2023-12-15 ENCOUNTER — OFFICE VISIT (OUTPATIENT)
Dept: HEMATOLOGY/ONCOLOGY | Facility: CLINIC | Age: 78
End: 2023-12-15
Payer: MEDICARE

## 2023-12-15 VITALS
OXYGEN SATURATION: 97 % | WEIGHT: 237.44 LBS | TEMPERATURE: 98.2 F | HEART RATE: 54 BPM | SYSTOLIC BLOOD PRESSURE: 114 MMHG | HEIGHT: 70 IN | RESPIRATION RATE: 16 BRPM | DIASTOLIC BLOOD PRESSURE: 59 MMHG | BODY MASS INDEX: 33.99 KG/M2

## 2023-12-15 DIAGNOSIS — D72.829 LEUKOCYTOSIS, UNSPECIFIED TYPE: ICD-10-CM

## 2023-12-15 DIAGNOSIS — D72.820 LYMPHOCYTOSIS: Primary | ICD-10-CM

## 2023-12-15 LAB
BASOPHILS # BLD AUTO: 0.06 X10*3/UL (ref 0–0.1)
BASOPHILS NFR BLD AUTO: 0.6 %
EOSINOPHIL # BLD AUTO: 0.23 X10*3/UL (ref 0–0.4)
EOSINOPHIL NFR BLD AUTO: 2.4 %
ERYTHROCYTE [DISTWIDTH] IN BLOOD BY AUTOMATED COUNT: 15.6 % (ref 11.5–14.5)
HCT VFR BLD AUTO: 34.2 % (ref 41–52)
HGB BLD-MCNC: 11.2 G/DL (ref 13.5–17.5)
IMM GRANULOCYTES # BLD AUTO: 0.01 X10*3/UL (ref 0–0.5)
IMM GRANULOCYTES NFR BLD AUTO: 0.1 % (ref 0–0.9)
LDH SERPL L TO P-CCNC: 183 U/L (ref 84–246)
LYMPHOCYTES # BLD AUTO: 3.74 X10*3/UL (ref 0.8–3)
LYMPHOCYTES NFR BLD AUTO: 39.7 %
MCH RBC QN AUTO: 27.3 PG (ref 26–34)
MCHC RBC AUTO-ENTMCNC: 32.7 G/DL (ref 32–36)
MCV RBC AUTO: 83 FL (ref 80–100)
MONOCYTES # BLD AUTO: 0.62 X10*3/UL (ref 0.05–0.8)
MONOCYTES NFR BLD AUTO: 6.6 %
NEUTROPHILS # BLD AUTO: 4.76 X10*3/UL (ref 1.6–5.5)
NEUTROPHILS NFR BLD AUTO: 50.6 %
PLATELET # BLD AUTO: 98 X10*3/UL (ref 150–450)
PROT SERPL-MCNC: 6.2 G/DL (ref 6.4–8.2)
RBC # BLD AUTO: 4.1 X10*6/UL (ref 4.5–5.9)
WBC # BLD AUTO: 9.4 X10*3/UL (ref 4.4–11.3)

## 2023-12-15 PROCEDURE — 1160F RVW MEDS BY RX/DR IN RCRD: CPT | Performed by: INTERNAL MEDICINE

## 2023-12-15 PROCEDURE — 1126F AMNT PAIN NOTED NONE PRSNT: CPT | Performed by: INTERNAL MEDICINE

## 2023-12-15 PROCEDURE — 1159F MED LIST DOCD IN RCRD: CPT | Performed by: INTERNAL MEDICINE

## 2023-12-15 PROCEDURE — 99204 OFFICE O/P NEW MOD 45 MIN: CPT | Performed by: INTERNAL MEDICINE

## 2023-12-15 PROCEDURE — 3074F SYST BP LT 130 MM HG: CPT | Performed by: INTERNAL MEDICINE

## 2023-12-15 PROCEDURE — 99214 OFFICE O/P EST MOD 30 MIN: CPT | Mod: PO | Performed by: INTERNAL MEDICINE

## 2023-12-15 PROCEDURE — 1036F TOBACCO NON-USER: CPT | Performed by: INTERNAL MEDICINE

## 2023-12-15 PROCEDURE — 36415 COLL VENOUS BLD VENIPUNCTURE: CPT | Performed by: INTERNAL MEDICINE

## 2023-12-15 PROCEDURE — 3078F DIAST BP <80 MM HG: CPT | Performed by: INTERNAL MEDICINE

## 2023-12-15 ASSESSMENT — COLUMBIA-SUICIDE SEVERITY RATING SCALE - C-SSRS
6. HAVE YOU EVER DONE ANYTHING, STARTED TO DO ANYTHING, OR PREPARED TO DO ANYTHING TO END YOUR LIFE?: NO
2. HAVE YOU ACTUALLY HAD ANY THOUGHTS OF KILLING YOURSELF?: NO
1. IN THE PAST MONTH, HAVE YOU WISHED YOU WERE DEAD OR WISHED YOU COULD GO TO SLEEP AND NOT WAKE UP?: NO

## 2023-12-15 ASSESSMENT — ENCOUNTER SYMPTOMS
LOSS OF SENSATION IN FEET: 0
DEPRESSION: 0
OCCASIONAL FEELINGS OF UNSTEADINESS: 0

## 2023-12-15 ASSESSMENT — PAIN SCALES - GENERAL: PAINLEVEL: 0-NO PAIN

## 2023-12-15 ASSESSMENT — PATIENT HEALTH QUESTIONNAIRE - PHQ9
2. FEELING DOWN, DEPRESSED OR HOPELESS: NOT AT ALL
SUM OF ALL RESPONSES TO PHQ9 QUESTIONS 1 AND 2: 0
1. LITTLE INTEREST OR PLEASURE IN DOING THINGS: NOT AT ALL

## 2023-12-15 NOTE — PROGRESS NOTES
Michael Dougherty is a 78 y.o. male evaluated for leukocytosis.  Subjective   Patient is a 71-year-old gentleman with a history of coronary artery disease, diabetes mellitus, hypertension, hyperlipidemia, osteoarthritis, obesity and CBC done on #14 2023 did show WBC: 13.3, hemoglobin 10.6, platelets 134.  68% neutrophils and 26% lymphocyte.  Absolute lymphocyte count was 3.45    Medical record reviewed  8/16/23 , WBC count 13.5, hemoglobin 11.9, platelets 127, 49% neutrophil, 43% lymphocyte, ALC 5.82    May 27, 2023, WBC count 11.3, hemoglobin 11.3, platelets 108, 48% lymphocyte, ALC 5.41    January 28, 2023, WBC count 15.8, hemoglobin 12.5, platelets 137, neutrophils 32%, lymphocyte 57%, ALC 9.15.    Patient also has stage III chronic renal insufficiency creatinine between 1.35-1.5.              Objective   Patient denies any fever, no headache, no sore throat, no night sweats.  Patient still very active.  Good appetite no chest pain and shortness of breath, patient is retired still working in the home.  History of weight loss.    ROS  No fever, no night sweats, questionable history of weight loss patient is active, no chest pain and shortness of breath, no sinus problem, no sore throat, no nausea vomiting, no abdominal pain, no diarrhea and constipation, chronic knee pain due to osteoarthritis    No past medical history on file.   Past Surgical History:   Procedure Laterality Date    HAND SURGERY  12/04/2014    Hand Surgery                                                                                                                                                          OTHER SURGICAL HISTORY  06/10/2021    Carpal tunnel surgery    OTHER SURGICAL HISTORY  06/10/2021    Trigger finger repair    OTHER SURGICAL HISTORY  06/10/2021    Wrist surgery    OTHER SURGICAL HISTORY  06/10/2021    Cataract surgery    OTHER SURGICAL HISTORY  06/10/2021    Foot surgery    OTHER SURGICAL HISTORY  06/10/2021    Knee replacement         Family History   Problem Relation Name Age of Onset    Diabetes Mother      Hypertension Father      Diabetes Father      Coronary artery disease Sister      Diabetes Brother      Melanoma Brother        Social History     Tobacco Use   Smoking Status Former    Packs/day: 1    Types: Cigarettes   Smokeless Tobacco Never        Current Outpatient Medications:     aspirin 81 mg EC tablet, Take 1 tablet (81 mg) by mouth once daily., Disp: , Rfl:     B complex-vitamin C-folic acid (Nephro-Anneliese) 0.8 mg tablet, Take 1 tablet by mouth once daily., Disp: , Rfl:     bisoprolol (Zebeta) 5 mg tablet, Take 1 tablet (5 mg) by mouth once daily., Disp: 90 tablet, Rfl: 3    brimonidine (AlphaGAN P) 0.2 % ophthalmic solution, Administer 1 drop into both eyes 2 times a day., Disp: , Rfl:     donepezil (Aricept) 5 mg tablet, Take 1 tablet (5 mg) by mouth once daily at bedtime., Disp: 30 tablet, Rfl: 11    fluticasone (Flonase) 50 mcg/actuation nasal spray, Administer 2 sprays into each nostril once daily. Shake gently. Before first use, prime pump. After use, clean tip and replace cap., Disp: 16 g, Rfl: 5    FreeStyle Lamine reader (FreeStyle Lamine 2 [place]) misc, Use as instructed, Disp: 1 each, Rfl: 0    FreeStyle Lamine sensor system (FreeStyle Lamine 2 Sensor) kit, Use as instructed, every 2 weeks, Disp: 1 each, Rfl: 0    hydroCHLOROthiazide (HYDRODiuril) 25 mg tablet, Take 1 tablet (25 mg) by mouth once daily., Disp: 90 tablet, Rfl: 3    insulin glargine (Basaglar KwikPen U-100 Insulin) 100 unit/mL (3 mL) pen, Inject 35 Units under the skin once daily at bedtime. Take as directed per insulin instructions., Disp: 31.5 mL, Rfl: 3    losartan (Cozaar) 100 mg tablet, Take 1 tablet (100 mg) by mouth once daily., Disp: 90 tablet, Rfl: 3    NovoLOG FlexPen U-100 Insulin 100 unit/mL (3 mL) pen, Inject 35 Units under the skin 3 times a day with meals. Take as directed per insulin instructions., Disp: 94.5 mL, Rfl: 3    OneTouch Ultra  "Test strip, USE 1 TEST STRIP 3 TIMES A DAY, Disp: 300 strip, Rfl: 3    pen needle, diabetic 32 gauge x 5/32\" needle, To be used TID, Disp: 300 each, Rfl: 3    rosuvastatin (Crestor) 40 mg tablet, Take 1 tablet (40 mg) by mouth once daily at bedtime., Disp: 90 tablet, Rfl: 3    timolol (Timoptic) 0.5 % ophthalmic solution, Administer 1 drop into both eyes 2 times a day., Disp: , Rfl:       Last Recorded Vitals  There were no vitals taken for this visit.    Physical Exam  Vitals reviewed.   Constitutional:       Appearance: Normal appearance.   HENT:      Head: Normocephalic and atraumatic.      Nose: Nose normal.   Eyes:      Extraocular Movements: Extraocular movements intact.      Pupils: Pupils are equal, round, and reactive to light.   Neck:      Comments: No carotid bruit, no cervical lymphadenopathy  Cardiovascular:      Rate and Rhythm: Normal rate and regular rhythm.   Pulmonary:      Effort: Pulmonary effort is normal.      Breath sounds: Normal breath sounds.   Abdominal:      General: Abdomen is flat. Bowel sounds are normal.      Palpations: Abdomen is soft.      Comments: No hepatosplenomegaly, normotonic bowel sounds   Musculoskeletal:         General: Normal range of motion.      Cervical back: Normal range of motion and neck supple.   Lymphadenopathy:      Comments: No peripheral lymphadenopathy   Skin:     General: Skin is warm and dry.   Neurological:      General: No focal deficit present.      Mental Status: He is alert and oriented to person, place, and time.           Relevant Results    Ref Range & Units 1 mo ago 4 mo ago 6 mo ago 10 mo ago   WBC  4.4 - 11.3 x10*3/uL 13.3 High  13.5 High  R 11.3 R 15.8 High  R   nRBC  0.0 - 0.0 /100 WBCs 0.0      RBC  4.50 - 5.90 x10*6/uL 4.21 Low  4.30 Low  R 4.14 Low  R 4.48 Low  R   Hemoglobin  13.5 - 17.5 g/dL 11.6 Low  11.9 Low  11.3 Low  12.5 Low    Hematocrit  41.0 - 52.0 % 35.6 Low  36.6 Low  35.5 Low  37.7 Low    MCV  80 - 100 fL 85 85 86 84 "   MCH  26.0 - 34.0 pg 27.6      MCHC  32.0 - 36.0 g/dL 32.6 32.5 31.8 Low  33.2   RDW  11.5 - 14.5 % 15.4 High  16.0 High  16.2 High  15.6 High    Platelets  150 - 450 x10*3/uL 134 Low  127 Low  R 108 Low  R 137 Low  R   Neutrophils %  40.0 - 80.0 % 68.7 49.4 41.7 32.9   Immature Granulocytes %, Automated  0.0 - 0.9 % 0.4 0.4 CM 0.3 CM 0.3 CM   Comment: Immature Granulocyte Count (IG) includes promyelocytes, myelocytes and metamyelocytes but does not include bands. Percent differential counts (%) should be interpreted in the context of the absolute cell counts (cells/UL).   Lymphocytes %  13.0 - 44.0 % 25.9 43.1 47.9 57.8   Monocytes %  2.0 - 10.0 % 4.8 5.9 6.7 5.8   Eosinophils %  0.0 - 6.0 % 0.0 0.8 2.7 2.5   Basophils %  0.0 - 2.0 % 0.2 0.4 0.7 0.7   Neutrophils Absolute  1.60 - 5.50 x10*3/uL 9.13 High  6.66 High  R 4.71 R 5.21 R   Comment: Percent differential counts (%) should be interpreted in the context of the absolute cell counts (cells/uL).   Immature Granulocytes Absolute, Automated  0.00 - 0.50 x10*3/uL 0.05      Lymphocytes Absolute  0.80 - 3.00 x10*3/uL 3.45 High  5.82 High  R 5.41 High  R 9.15 High  R   Monocytes Absolute  0.05 - 0.80 x10*3/uL 0.64 0.80 R 0.76 R 0.92 High  R   Eosinophils Absolute  0.00 - 0.40 x10*3/uL 0.00 0.11 R 0.30 R 0.40 R   Basophils Absolute  0.00 - 0.10 x10*3/uL 0.03 0.06 R 0.08 R 0.11 High               Lab Results   Component Value Date    WBC 13.3 (H) 11/14/2023    HGB 11.6 (L) 11/14/2023    HCT 35.6 (L) 11/14/2023     (L) 11/14/2023    CHOL 118 11/14/2023    TRIG 104 11/14/2023    HDL 34.5 11/14/2023    ALT 16 11/14/2023    AST 18 11/14/2023     11/14/2023    K 4.2 11/14/2023     11/14/2023    CREATININE 1.40 (H) 11/14/2023    BUN 35 (H) 11/14/2023    CO2 25 11/14/2023    TSH 2.29 10/01/2022    PSA 1.23 10/01/2022    HGBA1C 5.4 11/14/2023          Assessment/Plan   #1 leukocytosis with relative lymphocytosis.    Patient has a history of for leukocytosis  since January 2023.  Absolute lymphocyte count was 9.15 in January 2023 and in August 2023, ALC was 5.82.  Recent CBC done in November 2023, WBC count was 13.3 and ALC 3.45.  Patient does have relative lymphocytosis which is fluctuating, needs to rule out monoclonal lymphocytosis versus chronic lymphocytic leukemia.    2.  Normocytic anemia initial workup was negative could be due to underlying renal insufficiency continue to monitor CBC    3.  Chronic thrombocytopenia could be ITP.    I will repeat her CBC differential today, flow cytometry of blood to rule out lymphoproliferative disorder, LDH, serum protein electrophoresis and immunofixation.    Pathophysiology of leukocytosis discussed with patient  I spent 40 minutes in the professional and overall care of this patient.  Follow-up after 4-week  Stormy Johnson MD

## 2023-12-20 LAB
CELL COUNT (BLOOD): 9.4 X10*3/UL
CELL POPULATIONS: NORMAL
DIAGNOSIS: NORMAL
FLOW DIFFERENTIAL: NORMAL
FLOW TEST ORDERED: NORMAL
LAB TEST METHOD: NORMAL
NUMBER OF CELLS COLLECTED: NORMAL PER TUBE
PATH REPORT.TOTAL CANCER: NORMAL
SIGNATURE COMMENT: NORMAL
SPECIMEN VIABILITY: NORMAL

## 2023-12-21 LAB
ALBUMIN: 3.7 G/DL (ref 3.4–5)
ALPHA 1 GLOBULIN: 0.3 G/DL (ref 0.2–0.6)
ALPHA 2 GLOBULIN: 0.7 G/DL (ref 0.4–1.1)
BETA GLOBULIN: 0.6 G/DL (ref 0.5–1.2)
GAMMA GLOBULIN: 0.8 G/DL (ref 0.5–1.4)
IMMUNOFIXATION COMMENT: ABNORMAL
M-PROTEIN 1: 0.2 G/DL
PATH REVIEW - SERUM IMMUNOFIXATION: ABNORMAL
PATH REVIEW-SERUM PROTEIN ELECTROPHORESIS: ABNORMAL
PROTEIN ELECTROPHORESIS COMMENT: ABNORMAL

## 2023-12-29 RX ORDER — TRIAMCINOLONE ACETONIDE 40 MG/ML
40 INJECTION, SUSPENSION INTRA-ARTICULAR; INTRAMUSCULAR ONCE
Status: COMPLETED | OUTPATIENT
Start: 2023-12-29 | End: 2023-12-29

## 2023-12-29 RX ADMIN — TRIAMCINOLONE ACETONIDE 40 MG: 40 INJECTION, SUSPENSION INTRA-ARTICULAR; INTRAMUSCULAR at 20:47

## 2024-01-09 ENCOUNTER — ANCILLARY PROCEDURE (OUTPATIENT)
Dept: RADIOLOGY | Facility: CLINIC | Age: 79
End: 2024-01-09
Payer: MEDICARE

## 2024-01-09 DIAGNOSIS — Z13.6 SCREENING FOR HEART DISEASE: ICD-10-CM

## 2024-01-09 PROCEDURE — 75571 CT HRT W/O DYE W/CA TEST: CPT

## 2024-01-10 ENCOUNTER — TELEPHONE (OUTPATIENT)
Dept: PRIMARY CARE | Facility: CLINIC | Age: 79
End: 2024-01-10
Payer: MEDICARE

## 2024-01-10 DIAGNOSIS — I25.10 CORONARY ARTERIOSCLEROSIS: Primary | ICD-10-CM

## 2024-01-10 NOTE — TELEPHONE ENCOUNTER
Wife Nadya was informed of the [employer] message and will let us know if they do not reach out to them./wh

## 2024-01-10 NOTE — RESULT ENCOUNTER NOTE
Pts CA score is quite high- I'm going to get him in to see cardiology to see if they need to do any interventions on his vessels.

## 2024-01-10 NOTE — TELEPHONE ENCOUNTER
----- Message from Ulices hCaudhary MD sent at 1/10/2024 10:17 AM EST -----  Pts CA score is quite high- I'm going to get him in to see cardiology to see if they need to do any interventions on his vessels.

## 2024-01-10 NOTE — TELEPHONE ENCOUNTER
Ok I wrote to Dr. Manzo to see if she can see him or consider a catheterization. Subjective:     CC:   Chief Complaint   Patient presents with    Annual Exam       HPI:   Rodolfo Billings is a 53 y.o. male who presents for an annual exam. He is feeling well and has no complaints.    Patient reports he last saw Dr. Lewis (GI) in September. He continues to feel fatigued and continues to have abdominal pain.      Health Maintenance  Advanced directive: N/A  PT for falls prevention: N/A  Cholesterol Screening: Lipid panel ordered today  Diabetes Screening: Elevated blood glucose on CMP, A1c ordered today  AAA Screening: N/A  Aspirin Use: N//A      Anticipatory Guidance  Diet: Well balanced diet, states that he does eat a lot of sugar  Exercise: Has not been exercising as much due to feeling fatigued  Substance Abuse: N/A, does smoke cigarettes   Safe in relationship.   Seat belts, bike helmet, gun safety discussed.  Sun protection used.  Dental home.    Cancer screening  Colorectal Cancer Screening: Has not had colonscopy, is established with Dr. Lewis, will schedule an appointment   Lung Cancer Screening: Will refer to lung cancer screening program  Prostate Cancer Screening/PSA: N/A    Infectious disease screening/Immunizations  --STI Screening: Declined  --Practices safe sex.  --HIV Screening: Will order today  --Hepatitis C Screening: Previously completed with normal findings  --Immunizations:    Influenza: Up to date   HPV:  N/A   Tetanus: Up to date   Shingles: Will consider   Pneumococcal: Received today   Hepatitis B: Received today  COVID-19: At the pharmacy  Other immunizations: N/A    He  has a past medical history of Hepatic encephalopathy (HCC) (10/3/2022).  He  has a past surgical history that includes other.  Family History   Problem Relation Age of Onset    Cancer Neg Hx      Social History     Tobacco Use    Smoking status: Every Day     Current packs/day: 1.00     Average packs/day: 1 pack/day for 71.9 years (71.9 ttl pk-yrs)     Types: Cigarettes     Start date: 1/1/1982     "Smokeless tobacco: Never   Vaping Use    Vaping Use: Never used   Substance Use Topics    Alcohol use: Not Currently     Comment: \"Quit drinking x3.5 months ago\" previously 6pack beer per day x 20 years    Drug use: Yes     Frequency: 7.0 times per week     Types: Marijuana, Oral     Comment: THC gummies       Patient Active Problem List    Diagnosis Date Noted    Thrombocytopenia, unspecified (HCC) 11/08/2023    Insomnia 11/08/2023    Abdominal pain, generalized 11/08/2023    Liver cirrhosis (HCC) 10/04/2022    Decompensated liver disease (HCC) 10/03/2022    Ascites due to alcoholic cirrhosis (HCC) 10/03/2022       Current Outpatient Medications   Medication Sig Dispense Refill    omeprazole (PRILOSEC) 20 MG delayed-release capsule TAKE ONE CAPSULE BY MOUTH ONE TIME DAILY, 30 minutes before breakfast meal      dicyclomine (BENTYL) 10 MG Cap take 1 capsule by mouth every 6 hours as needed for abdominal cramps and or discomfort 120 Capsule 3    traZODone (DESYREL) 100 MG Tab Take 1 tablet by mouth at night as needed for sleep      acetaminophen (TYLENOL) 500 MG Tab Take 1 Tablet by mouth every 6 hours as needed.      albuterol 108 (90 Base) MCG/ACT Aero Soln inhalation aerosol Inhale 1-2 Puffs every 6 hours as needed for Shortness of Breath. 18 g 11    spironolactone (ALDACTONE) 100 MG Tab Take 1 Tablet by mouth every day. (Patient taking differently: Take 100 mg by mouth 2 times a day.) 30 Tablet 0    furosemide (LASIX) 40 MG Tab Take 1 Tablet by mouth every day. (Patient taking differently: Take 40 mg by mouth 2 times a day.) 30 Tablet 0     No current facility-administered medications for this visit.    (including changes today)  Allergies: Patient has no known allergies.    Review of Systems   Constitutional: Negative for fever, chills and malaise/fatigue.   HENT: Negative for congestion.    Eyes: Negative for pain.   Respiratory: Negative for cough and shortness of breath.    Cardiovascular: Negative for leg " "swelling.   Gastrointestinal: Negative for nausea, vomiting, abdominal pain and diarrhea.   Genitourinary: Negative for dysuria and hematuria.   Skin: Negative for rash.   Neurological: Negative for dizziness, focal weakness and headaches.   Endo/Heme/Allergies: Does not bleed easily.   Psychiatric/Behavioral: Negative for depression.  The patient is not nervous/anxious.      Objective:     /78   Pulse 86   Temp 36.7 °C (98 °F) (Temporal)   Resp 14   Ht 1.905 m (6' 3\")   Wt 108 kg (239 lb)   SpO2 97%   BMI 29.87 kg/m²   Body mass index is 29.87 kg/m².  Wt Readings from Last 4 Encounters:   11/08/23 108 kg (239 lb)   10/11/23 104 kg (230 lb)   11/17/22 109 kg (240 lb)   10/27/22 101 kg (223 lb 5.2 oz)       Physical Exam:  Constitutional: Well-developed and well-nourished. Not diaphoretic. No distress.   Skin: Skin is warm and dry. No rash noted.  Head: Atraumatic without lesions.  Eyes: Conjunctivae and extraocular motions are normal. Pupils are equal, round, and reactive to light. No scleral icterus.   Ears:  External ears unremarkable. Tympanic membranes clear and intact.  Nose: Nares patent. Septum midline. Turbinates without erythema nor edema. No discharge.   Mouth/Throat: Dentition is good. Tongue normal. Oropharynx is clear and moist. Posterior pharynx without erythema or exudates.  Neck: Supple, trachea midline. Normal range of motion. No thyromegaly present. No lymphadenopathy--cervical or supraclavicular.  Cardiovascular: Regular rate and rhythm, S1 and S2 without murmur, rubs, or gallops.    Lungs: Effort normal. Clear to auscultation throughout. No adventitious sounds. No CVA tenderness.  Abdomen: Soft, non tender, and without distention. Active bowel sounds in all four quadrants. No rebound, guarding, masses or HSM.  : Genitalia: Deferred  Rectal: deferred  Prostate: deferred  Extremities: No cyanosis, clubbing, erythema, nor edema. Distal pulses intact and symmetric.   Musculoskeletal: " All major joints AROM full in all directions without pain.  Neurological: Alert and oriented x 3. DTRs 2+/3 and symmetric. No cranial nerve deficit. 5/5 myotomes. Sensation intact.  Psychiatric:  Behavior, mood, and affect are appropriate.        Assessment and Plan:     1. Decompensated liver disease (HCC)  2. Alcoholic cirrhosis of liver with ascites (HCC)  3. Ascites due to alcoholic cirrhosis (HCC)  Chronic, ongoing.  Patient [facility]  Recent labs from November 2023 were discussed with the patient.  Patient to follow-up again with Dr. Lewis in March.  We will continue to follow-up with GI recs    4. Subclinical hypothyroidism  Recent labs from November 2023 revealed normal TSH but with decreased thyroxine hormone.  Will recheck TSH and T3-T4 in 2 months.  If can if thyroxine continues to be decreased will consider starting patient on Synthyroid.  - TSH; Future  - TRIIDOTHYRONINE; Future  - TRIIDOTHYRONINE; Future    5. Other fatigue  Possibly secondary to thyroid disorder versus vitamin B12 deficiency.  We will check B12 and folate levels.  - FOLATE; Future  - VITAMIN B12; Future    6. Vitamin D deficiency  - VITAMIN D,25 HYDROXY (DEFICIENCY); Future    7. Blood glucose elevated  CMP from November 2023 showed elevated blood glucose level.  Will check A1c.  - HEMOGLOBIN A1C; Future    8. Tobacco dependence  9. Screening for lung cancer  Patient is a current every day smoker.  He has decreased in smoking but not interested in quitting at this time. Will refer to lung cancer screening program.  - REFERRAL TO LUNG CANCER SCREENING PROGRAM    10. Need for vaccination  - Consent for Pneumococcal Vaccine  - Hepatitis B Vaccine Adult IM  - Pneumococcal Conjugate Vaccine 20-Valent (6 wks+)    11. Wellness examination  - Lipid Profile; Future  - HIV AG/AB COMBO ASSAY SCREENING; Future      HCM: Discussed.  Labs per orders.  Vaccinations per orders.  Counseling about diet,  supplements, exercise, skin care and safe sex.      Follow-up: Return in about 2 months (around 1/8/2024) for Chronic Conditions.

## 2024-01-10 NOTE — PROGRESS NOTES
Subjective   Patient ID: Michael Dougherty is a 78 y.o. male who presents for No chief complaint on file..  HPI    Patient Active Problem List   Diagnosis    Adverse effect of calcium-channel blocker    Atherosclerosis of aorta (CMS/HCC)    Cataracts, bilateral    Chondrocalcinosis of right knee    Coronary arteriosclerosis    Diabetes mellitus (CMS/HCC)    Gait abnormality    Heart murmur    Essential hypertension    Hyperglycemia due to type 2 diabetes mellitus (CMS/HCC)    Hyperlipidemia    Lesion of ulnar nerve, left upper limb    Localized primary osteoarthritis of carpometacarpal (CMC) joint of left wrist    Low back pain    Morbid obesity (CMS/HCC)    Nonadherence with dietary restriction    Stage 3a chronic kidney disease (CMS/HCC)    Sensorineural hearing loss (SNHL) of both ears       Social Connections: Not on file       Current Outpatient Medications on File Prior to Visit   Medication Sig Dispense Refill    aspirin 81 mg EC tablet Take 1 tablet (81 mg) by mouth once daily.      B complex-vitamin C-folic acid (Nephro-Anneliese) 0.8 mg tablet Take 1 tablet by mouth once daily.      bisoprolol (Zebeta) 5 mg tablet Take 1 tablet (5 mg) by mouth once daily. 90 tablet 3    brimonidine (AlphaGAN P) 0.2 % ophthalmic solution Administer 1 drop into both eyes 2 times a day.      donepezil (Aricept) 5 mg tablet Take 1 tablet (5 mg) by mouth once daily at bedtime. 30 tablet 11    fluticasone (Flonase) 50 mcg/actuation nasal spray Administer 2 sprays into each nostril once daily. Shake gently. Before first use, prime pump. After use, clean tip and replace cap. 16 g 5    FreeStyle Lamine reader (FreeStyle Lamine 2 [place]) misc Use as instructed 1 each 0    FreeStyle Lamine sensor system (FreeStyle Lamine 2 Sensor) kit Use as instructed, every 2 weeks 1 each 0    hydroCHLOROthiazide (HYDRODiuril) 25 mg tablet Take 1 tablet (25 mg) by mouth once daily. 90 tablet 3    insulin glargine (Basaglar KwikPen U-100 Insulin) 100 unit/mL (3  "mL) pen Inject 35 Units under the skin once daily at bedtime. Take as directed per insulin instructions. 31.5 mL 3    losartan (Cozaar) 100 mg tablet Take 1 tablet (100 mg) by mouth once daily. 90 tablet 3    NovoLOG FlexPen U-100 Insulin 100 unit/mL (3 mL) pen Inject 35 Units under the skin 3 times a day with meals. Take as directed per insulin instructions. 94.5 mL 3    OneTouch Ultra Test strip USE 1 TEST STRIP 3 TIMES A  strip 3    pen needle, diabetic 32 gauge x 5/32\" needle To be used  each 3    rosuvastatin (Crestor) 40 mg tablet Take 1 tablet (40 mg) by mouth once daily at bedtime. 90 tablet 3    timolol (Timoptic) 0.5 % ophthalmic solution Administer 1 drop into both eyes 2 times a day.       No current facility-administered medications on file prior to visit.        There were no vitals filed for this visit.  There were no vitals filed for this visit.    Review of Systems    Objective     Physical Exam    Office Visit on 12/15/2023   Component Date Value Ref Range Status    WBC 12/15/2023 9.4  4.4 - 11.3 x10*3/uL Final    RBC 12/15/2023 4.10 (L)  4.50 - 5.90 x10*6/uL Final    Hemoglobin 12/15/2023 11.2 (L)  13.5 - 17.5 g/dL Final    Hematocrit 12/15/2023 34.2 (L)  41.0 - 52.0 % Final    MCV 12/15/2023 83  80 - 100 fL Final    MCH 12/15/2023 27.3  26.0 - 34.0 pg Final    MCHC 12/15/2023 32.7  32.0 - 36.0 g/dL Final    RDW 12/15/2023 15.6 (H)  11.5 - 14.5 % Final    Platelets 12/15/2023 98 (L)  150 - 450 x10*3/uL Final    Neutrophils % 12/15/2023 50.6  40.0 - 80.0 % Final    Immature Granulocytes %, Automated 12/15/2023 0.1  0.0 - 0.9 % Final    Immature Granulocyte Count (IG) includes promyelocytes, myelocytes and metamyelocytes but does not include bands. Percent differential counts (%) should be interpreted in the context of the absolute cell counts (cells/UL).    Lymphocytes % 12/15/2023 39.7  13.0 - 44.0 % Final    Monocytes % 12/15/2023 6.6  2.0 - 10.0 % Final    Eosinophils % 12/15/2023 " 2.4  0.0 - 6.0 % Final    Basophils % 12/15/2023 0.6  0.0 - 2.0 % Final    Neutrophils Absolute 12/15/2023 4.76  1.60 - 5.50 x10*3/uL Final    Percent differential counts (%) should be interpreted in the context of the absolute cell counts (cells/uL).    Immature Granulocytes Absolute, Au* 12/15/2023 0.01  0.00 - 0.50 x10*3/uL Final    Lymphocytes Absolute 12/15/2023 3.74 (H)  0.80 - 3.00 x10*3/uL Final    Monocytes Absolute 12/15/2023 0.62  0.05 - 0.80 x10*3/uL Final    Eosinophils Absolute 12/15/2023 0.23  0.00 - 0.40 x10*3/uL Final    Basophils Absolute 12/15/2023 0.06  0.00 - 0.10 x10*3/uL Final    LDH 12/15/2023 183  84 - 246 U/L Final    Case Report 12/15/2023    Final                    Value:Flow Cytometry                                    Case: [id number]                                   Authorizing Provider:  Stormy Johnson MD          Collected:           12/15/2023 1027              Ordering Location:     [facility] Received:            12/15/2023 [address] [place] [address]                                                                Pathologist:           Espinoza Donaldson MD PhD                                                       Specimen:    Blood, Venous                                                                              Diagnosis 12/15/2023    Final                    Value:This result contains rich text formatting which cannot be displayed here.      12/15/2023    Final                    Value:This result contains rich text formatting which cannot be displayed here.    Cell Populations 12/15/2023    Final                    Value:This result contains rich text formatting which cannot be displayed here.    Flow Differential 12/15/2023    Final                    Value:This result contains rich text formatting which cannot be displayed here.    Flow Test Ordered 12/15/2023 Low Grade Panel  not established Final    Specimen Viability 12/15/2023  Acceptable  not established Final    Cell Count 12/15/2023 9.40  not established x10*3/uL Final    Number of Cells Collected 12/15/2023 100,000.00  not established per tube Final    Methodology 12/15/2023    Final                    Value:This result contains rich text formatting which cannot be displayed here.    Total Protein 12/15/2023 6.2 (L)  6.4 - 8.2 g/dL Final    Albumin 12/15/2023 3.7  3.4 - 5.0 g/dL Final    Alpha 1 Globulin 12/15/2023 0.3  0.2 - 0.6 g/dL Final    Alpha 2 Globulin 12/15/2023 0.7  0.4 - 1.1 g/dL Final    Beta Globulin 12/15/2023 0.6  0.5 - 1.2 g/dL Final    Gamma 12/15/2023 0.8  0.5 - 1.4 g/dL Final    M-PROTEIN 1 12/15/2023 0.2 (H)    g/dL Final    Protein Electrophoresis Comment 12/15/2023 Aberrant band detected. See immunofixation.   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   Suggest further evaluation for the diagnosis of plasma cell dyscrasia.      Final    Path Review - Serum Protein Electr* 12/15/2023 Reviewed and approved by BRISEIDA SHAY on 12/21/23 at 9:11 AM.       Final    Path Review - Serum Immunofixation 12/15/2023 Reviewed and approved by BRISEIDA SHAY on 12/21/23 at 9:11 AM.       Final   Lab on 11/14/2023   Component Date Value Ref Range Status    Glucose 11/14/2023 179 (H)  74 - 99 mg/dL Final    Sodium 11/14/2023 139  136 - 145 mmol/L Final    Potassium 11/14/2023 4.2  3.5 - 5.3 mmol/L Final    Chloride 11/14/2023 105  98 - 107 mmol/L Final    Bicarbonate 11/14/2023 25  21 - 32 mmol/L Final    Anion Gap 11/14/2023 13  10 - 20 mmol/L Final    Urea Nitrogen 11/14/2023 35 (H)  6 - 23 mg/dL Final    Creatinine 11/14/2023 1.40 (H)  0.50 - 1.30 mg/dL Final    eGFR 11/14/2023 52 (L)  >60 mL/min/1.73m*2 Final    Calculations of estimated GFR are performed using the 2021 CKD-EPI Study Refit equation without the race variable for the IDMS-Traceable creatinine  methods.  https://jasn.asnjournals.org/content/early/2021/09/22/ASN.7928500193    Calcium 11/14/2023 9.5  8.6 - 10.3 mg/dL Final    Albumin 11/14/2023 4.2  3.4 - 5.0 g/dL Final    Alkaline Phosphatase 11/14/2023 64  33 - 136 U/L Final    Total Protein 11/14/2023 6.2 (L)  6.4 - 8.2 g/dL Final    AST 11/14/2023 18  9 - 39 U/L Final    Bilirubin, Total 11/14/2023 0.8  0.0 - 1.2 mg/dL Final    ALT 11/14/2023 16  10 - 52 U/L Final    Patients treated with Sulfasalazine may generate falsely decreased results for ALT.    Hemoglobin A1C 11/14/2023 5.4  see below % Final    Estimated Average Glucose 11/14/2023 108  Not Established mg/dL Final    Cholesterol 11/14/2023 118  0 - 199 mg/dL Final          Age      Desirable   Borderline High   High     0-19 Y     0 - 169       170 - 199     >/= 200    20-24 Y     0 - 189       190 - 224     >/= 225         >24 Y     0 - 199       200 - 239     >/= 240   **All ranges are based on fasting samples. Specific   therapeutic targets will vary based on patient-specific   cardiac risk.    Pediatric guidelines reference:Pediatrics 2011, 128(S5).Adult guidelines reference: NCEP ATPIII Guidelines,GELA 2001, 258:2486-97    Venipuncture immediately after or during the administration of Metamizole may lead to falsely low results. Testing should be performed immediately prior to Metamizole dosing.    HDL-Cholesterol 11/14/2023 34.5  mg/dL Final      Age       Very Low   Low     Normal    High    0-19 Y    < 35      < 40     40-45     ----  20-24 Y    ----     < 40      >45      ----        >24 Y      ----     < 40     40-60      >60      Cholesterol/HDL Ratio 11/14/2023 3.4   Final      Ref Values  Desirable  < 3.4  High Risk  > 5.0    LDL Calculated 11/14/2023 63  <=99 mg/dL Final                                Near   Borderline      AGE      Desirable  Optimal    High     High     Very High     0-19 Y     0 - 109     ---    110-129   >/= 130     ----    20-24 Y     0 - 119     ---    120-159    >/= 160     ----      >24 Y     0 -  99   100-129  130-159   160-189     >/=190      VLDL 11/14/2023 21  0 - 40 mg/dL Final    Triglycerides 11/14/2023 104  0 - 149 mg/dL Final       Age         Desirable   Borderline High   High     Very High   0 D-90 D    19 - 174         ----         ----        ----  91 D- 9 Y     0 -  74        75 -  99     >/= 100      ----    10-19 Y     0 -  89        90 - 129     >/= 130      ----    20-24 Y     0 - 114       115 - 149     >/= 150      ----         >24 Y     0 - 149       150 - 199    200- 499    >/= 500    Venipuncture immediately after or during the administration of Metamizole may lead to falsely low results. Testing should be performed immediately prior to Metamizole dosing.    Non HDL Cholesterol 11/14/2023 84  0 - 149 mg/dL Final          Age       Desirable   Borderline High   High     Very High     0-19 Y     0 - 119       120 - 144     >/= 145    >/= 160    20-24 Y     0 - 149       150 - 189     >/= 190      ----         >24 Y    30 mg/dL above LDL Cholesterol goal      WBC 11/14/2023 13.3 (H)  4.4 - 11.3 x10*3/uL Final    nRBC 11/14/2023 0.0  0.0 - 0.0 /100 WBCs Final    RBC 11/14/2023 4.21 (L)  4.50 - 5.90 x10*6/uL Final    Hemoglobin 11/14/2023 11.6 (L)  13.5 - 17.5 g/dL Final    Hematocrit 11/14/2023 35.6 (L)  41.0 - 52.0 % Final    MCV 11/14/2023 85  80 - 100 fL Final    MCH 11/14/2023 27.6  26.0 - 34.0 pg Final    MCHC 11/14/2023 32.6  32.0 - 36.0 g/dL Final    RDW 11/14/2023 15.4 (H)  11.5 - 14.5 % Final    Platelets 11/14/2023 134 (L)  150 - 450 x10*3/uL Final    Neutrophils % 11/14/2023 68.7  40.0 - 80.0 % Final    Immature Granulocytes %, Automated 11/14/2023 0.4  0.0 - 0.9 % Final    Immature Granulocyte Count (IG) includes promyelocytes, myelocytes and metamyelocytes but does not include bands. Percent differential counts (%) should be interpreted in the context of the absolute cell counts (cells/UL).    Lymphocytes % 11/14/2023 25.9  13.0 - 44.0 %  Final    Monocytes % 11/14/2023 4.8  2.0 - 10.0 % Final    Eosinophils % 11/14/2023 0.0  0.0 - 6.0 % Final    Basophils % 11/14/2023 0.2  0.0 - 2.0 % Final    Neutrophils Absolute 11/14/2023 9.13 (H)  1.60 - 5.50 x10*3/uL Final    Percent differential counts (%) should be interpreted in the context of the absolute cell counts (cells/uL).    Immature Granulocytes Absolute, Au* 11/14/2023 0.05  0.00 - 0.50 x10*3/uL Final    Lymphocytes Absolute 11/14/2023 3.45 (H)  0.80 - 3.00 x10*3/uL Final    Monocytes Absolute 11/14/2023 0.64  0.05 - 0.80 x10*3/uL Final    Eosinophils Absolute 11/14/2023 0.00  0.00 - 0.40 x10*3/uL Final    Basophils Absolute 11/14/2023 0.03  0.00 - 0.10 x10*3/uL Final       Assessment/Plan

## 2024-01-17 ENCOUNTER — OFFICE VISIT (OUTPATIENT)
Dept: HEMATOLOGY/ONCOLOGY | Facility: CLINIC | Age: 79
End: 2024-01-17
Payer: MEDICARE

## 2024-01-17 VITALS
SYSTOLIC BLOOD PRESSURE: 147 MMHG | BODY MASS INDEX: 34.24 KG/M2 | WEIGHT: 239.2 LBS | OXYGEN SATURATION: 98 % | HEIGHT: 70 IN | DIASTOLIC BLOOD PRESSURE: 59 MMHG | RESPIRATION RATE: 16 BRPM | HEART RATE: 54 BPM | TEMPERATURE: 98.2 F

## 2024-01-17 DIAGNOSIS — D72.820 MONOCLONAL B-CELL LYMPHOCYTOSIS: ICD-10-CM

## 2024-01-17 DIAGNOSIS — D72.820 LYMPHOCYTOSIS: ICD-10-CM

## 2024-01-17 DIAGNOSIS — C91.10 CLL (CHRONIC LYMPHOCYTIC LEUKEMIA) (MULTI): Primary | ICD-10-CM

## 2024-01-17 DIAGNOSIS — D72.829 LEUKOCYTOSIS, UNSPECIFIED TYPE: ICD-10-CM

## 2024-01-17 PROCEDURE — 99213 OFFICE O/P EST LOW 20 MIN: CPT | Performed by: INTERNAL MEDICINE

## 2024-01-17 PROCEDURE — 1036F TOBACCO NON-USER: CPT | Performed by: INTERNAL MEDICINE

## 2024-01-17 PROCEDURE — 1126F AMNT PAIN NOTED NONE PRSNT: CPT | Performed by: INTERNAL MEDICINE

## 2024-01-17 PROCEDURE — 3077F SYST BP >= 140 MM HG: CPT | Performed by: INTERNAL MEDICINE

## 2024-01-17 PROCEDURE — 1159F MED LIST DOCD IN RCRD: CPT | Performed by: INTERNAL MEDICINE

## 2024-01-17 PROCEDURE — 3078F DIAST BP <80 MM HG: CPT | Performed by: INTERNAL MEDICINE

## 2024-01-17 PROCEDURE — 1160F RVW MEDS BY RX/DR IN RCRD: CPT | Performed by: INTERNAL MEDICINE

## 2024-01-17 ASSESSMENT — PAIN SCALES - GENERAL: PAINLEVEL: 0-NO PAIN

## 2024-01-17 NOTE — PROGRESS NOTES
Michael Dougherty is a 78 y.o. male evaluated for leukocytosis.  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  Normocytic anemia    3.  Chronic thrombocytopenia    4.  IgM monoclonal gammopathy    Interval history  1/17/24  Patient was evaluated for lymphocytosis.  Flow cytometry did show CD5 positive lymphoproliferative disorder.  Lymphocyte also positive for CD20, CD23, CD38.  Last time absolute absolute lymphocyte count was 3.4 and before that absolute lymphocyte count was more than 5000.    Serum protein electrophoresis did show IgG monoclonal gammopathy.  Labs are discussed with the patient.    Patient is asymptomatic            Subjective   Patient is a 71-year-old gentleman with a history of coronary artery disease, diabetes mellitus, hypertension, hyperlipidemia, osteoarthritis, obesity and CBC done on #14 2023 did show WBC: 13.3, hemoglobin 10.6, platelets 134.  68% neutrophils and 26% lymphocyte.  Absolute lymphocyte count was 3.45    Medical record reviewed  8/16/23 , WBC count 13.5, hemoglobin 11.9, platelets 127, 49% neutrophil, 43% lymphocyte, ALC 5.82    May 27, 2023, WBC count 11.3, hemoglobin 11.3, platelets 108, 48% lymphocyte, ALC 5.41    January 28, 2023, WBC count 15.8, hemoglobin 12.5, platelets 137, neutrophils 32%, lymphocyte 57%, ALC 9.15.    Patient also has stage III chronic renal insufficiency creatinine between 1.35-1.5.              Objective   Patient denies any fever, no headache, no sore throat, no night sweats.  Patient still very active.  Good appetite no chest pain and shortness of breath, patient is retired still working in the home.  History of weight loss.    ROS  No fever, no night sweats, questionable history of weight loss patient is active, no chest pain and shortness of breath, no sinus problem, no sore throat, no nausea vomiting, no abdominal pain, no diarrhea and  constipation, chronic knee pain due to osteoarthritis    No past medical history on file.   Past Surgical History:   Procedure Laterality Date    HAND SURGERY  12/04/2014    Hand Surgery                                                                                                                                                          OTHER SURGICAL HISTORY  06/10/2021    Carpal tunnel surgery    OTHER SURGICAL HISTORY  06/10/2021    Trigger finger repair    OTHER SURGICAL HISTORY  06/10/2021    Wrist surgery    OTHER SURGICAL HISTORY  06/10/2021    Cataract surgery    OTHER SURGICAL HISTORY  06/10/2021    Foot surgery    OTHER SURGICAL HISTORY  06/10/2021    Knee replacement        Family History   Problem Relation Name Age of Onset    Diabetes Mother      Hypertension Father      Diabetes Father      Coronary artery disease Sister      Diabetes Brother      Melanoma Brother        Social History     Tobacco Use   Smoking Status Former    Packs/day: 1    Types: Cigarettes   Smokeless Tobacco Never        Current Outpatient Medications:     aspirin 81 mg EC tablet, Take 1 tablet (81 mg) by mouth once daily., Disp: , Rfl:     B complex-vitamin C-folic acid (Nephro-Anneliese) 0.8 mg tablet, Take 1 tablet by mouth once daily., Disp: , Rfl:     bisoprolol (Zebeta) 5 mg tablet, Take 1 tablet (5 mg) by mouth once daily., Disp: 90 tablet, Rfl: 3    brimonidine (AlphaGAN P) 0.2 % ophthalmic solution, Administer 1 drop into both eyes 2 times a day., Disp: , Rfl:     donepezil (Aricept) 5 mg tablet, Take 1 tablet (5 mg) by mouth once daily at bedtime., Disp: 30 tablet, Rfl: 11    fluticasone (Flonase) 50 mcg/actuation nasal spray, Administer 2 sprays into each nostril once daily. Shake gently. Before first use, prime pump. After use, clean tip and replace cap., Disp: 16 g, Rfl: 5    FreeStyle [place]amine reader (FreeStyle Lamine 2 [place]) misc, Use as instructed, Disp: 1 each, Rfl: 0    FreeStyle Lamine sensor system (FreeStyle Lamine 2  "Sensor) kit, Use as instructed, every 2 weeks, Disp: 1 each, Rfl: 0    hydroCHLOROthiazide (HYDRODiuril) 25 mg tablet, Take 1 tablet (25 mg) by mouth once daily., Disp: 90 tablet, Rfl: 3    insulin glargine (Basaglar KwikPen U-100 Insulin) 100 unit/mL (3 mL) pen, Inject 35 Units under the skin once daily at bedtime. Take as directed per insulin instructions., Disp: 31.5 mL, Rfl: 3    losartan (Cozaar) 100 mg tablet, Take 1 tablet (100 mg) by mouth once daily., Disp: 90 tablet, Rfl: 3    NovoLOG FlexPen U-100 Insulin 100 unit/mL (3 mL) pen, Inject 35 Units under the skin 3 times a day with meals. Take as directed per insulin instructions., Disp: 94.5 mL, Rfl: 3    OneTouch Ultra Test strip, USE 1 TEST STRIP 3 TIMES A DAY, Disp: 300 strip, Rfl: 3    pen needle, diabetic 32 gauge x 5/32\" needle, To be used TID, Disp: 300 each, Rfl: 3    rosuvastatin (Crestor) 40 mg tablet, Take 1 tablet (40 mg) by mouth once daily at bedtime., Disp: 90 tablet, Rfl: 3    timolol (Timoptic) 0.5 % ophthalmic solution, Administer 1 drop into both eyes 2 times a day., Disp: , Rfl:       Last Recorded Vitals  There were no vitals taken for this visit.    Physical Exam  Vitals reviewed.   Constitutional:       Appearance: Normal appearance.   HENT:      Head: Normocephalic and atraumatic.      Nose: Nose normal.   Eyes:      Extraocular Movements: Extraocular movements intact.      Pupils: Pupils are equal, round, and reactive to light.   Neck:      Comments: No carotid bruit, no cervical lymphadenopathy  Cardiovascular:      Rate and Rhythm: Normal rate and regular rhythm.   Pulmonary:      Effort: Pulmonary effort is normal.      Breath sounds: Normal breath sounds.   Abdominal:      General: Abdomen is flat. Bowel sounds are normal.      Palpations: Abdomen is soft.      Comments: No hepatosplenomegaly, normotonic bowel sounds   Musculoskeletal:         General: Normal range of motion.      Cervical back: Normal range of motion and neck " supple.   Lymphadenopathy:      Comments: No peripheral lymphadenopathy   Skin:     General: Skin is warm and dry.   Neurological:      General: No focal deficit present.      Mental Status: He is alert and oriented to person, place, and time.           Relevant Results    Ref Range & Units 1 mo ago 4 mo ago 6 mo ago 10 mo ago   WBC  4.4 - 11.3 x10*3/uL 13.3 High  13.5 High  R 11.3 R 15.8 High  R   nRBC  0.0 - 0.0 /100 WBCs 0.0      RBC  4.50 - 5.90 x10*6/uL 4.21 Low  4.30 Low  R 4.14 Low  R 4.48 Low  R   Hemoglobin  13.5 - 17.5 g/dL 11.6 Low  11.9 Low  11.3 Low  12.5 Low    Hematocrit  41.0 - 52.0 % 35.6 Low  36.6 Low  35.5 Low  37.7 Low    MCV  80 - 100 fL 85 85 86 84   MCH  26.0 - 34.0 pg 27.6      MCHC  32.0 - 36.0 g/dL 32.6 32.5 31.8 Low  33.2   RDW  11.5 - 14.5 % 15.4 High  16.0 High  16.2 High  15.6 High    Platelets  150 - 450 x10*3/uL 134 Low  127 Low  R 108 Low  R 137 Low  R   Neutrophils %  40.0 - 80.0 % 68.7 49.4 41.7 32.9   Immature Granulocytes %, Automated  0.0 - 0.9 % 0.4 0.4 CM 0.3 CM 0.3 CM   Comment: Immature Granulocyte Count (IG) includes promyelocytes, myelocytes and metamyelocytes but does not include bands. Percent differential counts (%) should be interpreted in the context of the absolute cell counts (cells/UL).   Lymphocytes %  13.0 - 44.0 % 25.9 43.1 47.9 57.8   Monocytes %  2.0 - 10.0 % 4.8 5.9 6.7 5.8   Eosinophils %  0.0 - 6.0 % 0.0 0.8 2.7 2.5   Basophils %  0.0 - 2.0 % 0.2 0.4 0.7 0.7   Neutrophils Absolute  1.60 - 5.50 x10*3/uL 9.13 High  6.66 High  R 4.71 R 5.21 R   Comment: Percent differential counts (%) should be interpreted in the context of the absolute cell counts (cells/uL).   Immature Granulocytes Absolute, Automated  0.00 - 0.50 x10*3/uL 0.05      Lymphocytes Absolute  0.80 - 3.00 x10*3/uL 3.45 High  5.82 High  R 5.41 High  R 9.15 High  R   Monocytes Absolute  0.05 - 0.80 x10*3/uL 0.64 0.80 R 0.76 R 0.92 High  R   Eosinophils Absolute  0.00 - 0.40 x10*3/uL 0.00 0.11 R 0.30  R 0.40 R   Basophils Absolute  0.00 - 0.10 x10*3/uL 0.03 0.06 R 0.08 R 0.11 High          Immunophenotypic findings consistent with CD5 positive clonal B cell population, see note.      No abnormal T cell population identified.      Note: The cells are CD5 positive and mostly have a characteristic chronic lymphocytic leukemia/small lymphocytic lymphoma phenotype but with some atypia (such as lack of CD43 expression). The frequency of the clonal population (less than 5000 cells per uL is also insufficient to diagnose CLL.  Correlation with clinical, morphologic and genetic findings will be helpful in establishing a diagnosis.      Electronically signed by Espinoza Donaldson MD PhD on 12/20/2023 at 0922        By the signature on this report, the individual or group listed as making the Final Interpretation/Diagnosis certifies that they have reviewed this case and the staining reactivity of the antibodies and reagents in the analysis were determined to be acceptable. Diagnostic interpretation performed at [facility]   Cell Populations    Abnormal Cell Population: Lymphocytes     Percentage:  27 %         Phenotype   Marker Interpretation      CD1c Partial   CD5 Positive Dim-moderate   CD10 Negative   CD11c Dim-Negative   CD13 Negative   CD19 Positive moderate   CD20 Positive moderate   CD23 Positive dim-moderate   CD25 Negative   CD38 Positive   CD43 Negative   CD45 Positive moderate   CD56 Negative   CD79b Positive dim    Dim    Positive moderate   HLA-DR Positive moderate      IgD Positive dim   Kappa Negative   Lambda Negative                Flow Differential    Lymphocyte: 39 %                  CD3+CD4+: 10 % ;                                         CD3+CD8+: 13 % ;                                         Natural Killer Cells: 2 %                               CD19+: 75 %                  Monocyte: 5 %       Granulocyte: 52 %           Flow Test Ordered  not established Low Grade Panel   Specimen  Viability  not established Acceptable   Cell Count  not established x10*3/uL 9.40   Number of Cells Collected  not established per tube 100,000.00   Methodology             Assessment/Plan   #1  CD5 positive lymphoproliferative disorder, 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  Absolute lymphocyte count was 9.15 in January 2023 and in August 2023, ALC was 5.82.  Recent CBC done in November 2023, WBC count was 13.3 and ALC 3.45.  Patient does have relative lymphocytosis which is fluctuating, needs to rule out monoclonal lymphocytosis versus chronic lymphocytic leukemia.    2.  Normocytic anemia initial workup was negative could be due to underlying renal insufficiency continue to monitor CBC    3.  Chronic thrombocytopenia could be ITP.    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  Patient has good prognosis, patient does not need any treatment.  Possible complication associated with the CLL including frequent infection, history of hemolytic anemia, ITP, reactive transformation discussed with patient briefly.  I will continue to monitor clinically.  CBC will be repeated every 6 months.  Stormy Johnson MD

## 2024-01-17 NOTE — PATIENT INSTRUCTIONS
Follow up with Dr. Johnson in 6 months.     Lab appointments are no longer scheduled. Please arrive at least 15 minutes before scheduled appointment time for blood work.

## 2024-01-26 ENCOUNTER — OFFICE VISIT (OUTPATIENT)
Dept: PRIMARY CARE | Facility: CLINIC | Age: 79
End: 2024-01-26
Payer: MEDICARE

## 2024-01-26 VITALS
OXYGEN SATURATION: 97 % | HEART RATE: 56 BPM | WEIGHT: 238 LBS | TEMPERATURE: 97 F | BODY MASS INDEX: 34.15 KG/M2 | SYSTOLIC BLOOD PRESSURE: 124 MMHG | DIASTOLIC BLOOD PRESSURE: 72 MMHG

## 2024-01-26 DIAGNOSIS — I25.10 CORONARY ARTERY DISEASE INVOLVING NATIVE HEART WITHOUT ANGINA PECTORIS, UNSPECIFIED VESSEL OR LESION TYPE: Primary | ICD-10-CM

## 2024-01-26 DIAGNOSIS — H66.92 LEFT OTITIS MEDIA, UNSPECIFIED OTITIS MEDIA TYPE: Primary | ICD-10-CM

## 2024-01-26 PROBLEM — D64.9 ANEMIA: Status: ACTIVE | Noted: 2023-08-16

## 2024-01-26 PROCEDURE — 1126F AMNT PAIN NOTED NONE PRSNT: CPT | Performed by: FAMILY MEDICINE

## 2024-01-26 PROCEDURE — 99213 OFFICE O/P EST LOW 20 MIN: CPT | Performed by: FAMILY MEDICINE

## 2024-01-26 PROCEDURE — 1160F RVW MEDS BY RX/DR IN RCRD: CPT | Performed by: FAMILY MEDICINE

## 2024-01-26 PROCEDURE — 1036F TOBACCO NON-USER: CPT | Performed by: FAMILY MEDICINE

## 2024-01-26 PROCEDURE — 3078F DIAST BP <80 MM HG: CPT | Performed by: FAMILY MEDICINE

## 2024-01-26 PROCEDURE — 3074F SYST BP LT 130 MM HG: CPT | Performed by: FAMILY MEDICINE

## 2024-01-26 PROCEDURE — 1159F MED LIST DOCD IN RCRD: CPT | Performed by: FAMILY MEDICINE

## 2024-01-26 RX ORDER — AMOXICILLIN AND CLAVULANATE POTASSIUM 875; 125 MG/1; MG/1
875 TABLET, FILM COATED ORAL 2 TIMES DAILY
Qty: 20 TABLET | Refills: 0 | Status: SHIPPED | OUTPATIENT
Start: 2024-01-26 | End: 2024-02-05

## 2024-01-26 NOTE — PATIENT INSTRUCTIONS
1. Left otitis media, unspecified otitis media type  Try oral abx, if no improvement call the office  - amoxicillin-pot clavulanate (Augmentin) 875-125 mg tablet; Take 1 tablet (875 mg) by mouth 2 times a day for 10 days.  Dispense: 20 tablet; Refill: 0

## 2024-01-26 NOTE — PROGRESS NOTES
Subjective   Patient ID: Michael Dougherty is a 78 y.o. male who presents for No chief complaint on file..  HPI    Patient Active Problem List   Diagnosis    Adverse effect of calcium-channel blocker    Atherosclerosis of aorta (CMS/HCC)    Cataracts, bilateral    Chondrocalcinosis of right knee    Coronary arteriosclerosis    Diabetes mellitus (CMS/HCC)    Gait abnormality    Heart murmur    Essential hypertension    Hyperglycemia due to type 2 diabetes mellitus (CMS/HCC)    Hyperlipidemia    Lesion of ulnar nerve, left upper limb    Localized primary osteoarthritis of carpometacarpal (CMC) joint of left wrist    Low back pain    Morbid obesity (CMS/HCC)    Nonadherence with dietary restriction    Stage 3a chronic kidney disease (CMS/HCC)    Sensorineural hearing loss (SNHL) of both ears       Social Connections: Not on file       Current Outpatient Medications on File Prior to Visit   Medication Sig Dispense Refill    aspirin 81 mg EC tablet Take 1 tablet (81 mg) by mouth once daily.      B complex-vitamin C-folic acid (Nephro-Anneliese) 0.8 mg tablet Take 1 tablet by mouth once daily.      bisoprolol (Zebeta) 5 mg tablet Take 1 tablet (5 mg) by mouth once daily. 90 tablet 3    brimonidine (AlphaGAN P) 0.2 % ophthalmic solution Administer 1 drop into both eyes 2 times a day.      donepezil (Aricept) 5 mg tablet Take 1 tablet (5 mg) by mouth once daily at bedtime. 30 tablet 11    fluticasone (Flonase) 50 mcg/actuation nasal spray Administer 2 sprays into each nostril once daily. Shake gently. Before first use, prime pump. After use, clean tip and replace cap. 16 g 5    FreeStyle Lamine reader (FreeStyle Lamine 2 [place]) misc Use as instructed 1 each 0    FreeStyle Lamine sensor system (FreeStyle Lamine 2 Sensor) kit Use as instructed, every 2 weeks 1 each 0    hydroCHLOROthiazide (HYDRODiuril) 25 mg tablet Take 1 tablet (25 mg) by mouth once daily. 90 tablet 3    insulin glargine (Basaglar KwikPen U-100 Insulin) 100 unit/mL (3  "mL) pen Inject 35 Units under the skin once daily at bedtime. Take as directed per insulin instructions. 31.5 mL 3    losartan (Cozaar) 100 mg tablet Take 1 tablet (100 mg) by mouth once daily. 90 tablet 3    NovoLOG FlexPen U-100 Insulin 100 unit/mL (3 mL) pen Inject 35 Units under the skin 3 times a day with meals. Take as directed per insulin instructions. 94.5 mL 3    OneTouch Ultra Test strip USE 1 TEST STRIP 3 TIMES A  strip 3    pen needle, diabetic 32 gauge x 5/32\" needle To be used  each 3    rosuvastatin (Crestor) 40 mg tablet Take 1 tablet (40 mg) by mouth once daily at bedtime. 90 tablet 3    timolol (Timoptic) 0.5 % ophthalmic solution Administer 1 drop into both eyes 2 times a day.       No current facility-administered medications on file prior to visit.        There were no vitals filed for this visit.  There were no vitals filed for this visit.    Review of Systems    Objective     Physical Exam    Office Visit on 12/15/2023   Component Date Value Ref Range Status    WBC 12/15/2023 9.4  4.4 - 11.3 x10*3/uL Final    RBC 12/15/2023 4.10 (L)  4.50 - 5.90 x10*6/uL Final    Hemoglobin 12/15/2023 11.2 (L)  13.5 - 17.5 g/dL Final    Hematocrit 12/15/2023 34.2 (L)  41.0 - 52.0 % Final    MCV 12/15/2023 83  80 - 100 fL Final    MCH 12/15/2023 27.3  26.0 - 34.0 pg Final    MCHC 12/15/2023 32.7  32.0 - 36.0 g/dL Final    RDW 12/15/2023 15.6 (H)  11.5 - 14.5 % Final    Platelets 12/15/2023 98 (L)  150 - 450 x10*3/uL Final    Neutrophils % 12/15/2023 50.6  40.0 - 80.0 % Final    Immature Granulocytes %, Automated 12/15/2023 0.1  0.0 - 0.9 % Final    Immature Granulocyte Count (IG) includes promyelocytes, myelocytes and metamyelocytes but does not include bands. Percent differential counts (%) should be interpreted in the context of the absolute cell counts (cells/UL).    Lymphocytes % 12/15/2023 39.7  13.0 - 44.0 % Final    Monocytes % 12/15/2023 6.6  2.0 - 10.0 % Final    Eosinophils % 12/15/2023 " 2.4  0.0 - 6.0 % Final    Basophils % 12/15/2023 0.6  0.0 - 2.0 % Final    Neutrophils Absolute 12/15/2023 4.76  1.60 - 5.50 x10*3/uL Final    Percent differential counts (%) should be interpreted in the context of the absolute cell counts (cells/uL).    Immature Granulocytes Absolute, Au* 12/15/2023 0.01  0.00 - 0.50 x10*3/uL Final    Lymphocytes Absolute 12/15/2023 3.74 (H)  0.80 - 3.00 x10*3/uL Final    Monocytes Absolute 12/15/2023 0.62  0.05 - 0.80 x10*3/uL Final    Eosinophils Absolute 12/15/2023 0.23  0.00 - 0.40 x10*3/uL Final    Basophils Absolute 12/15/2023 0.06  0.00 - 0.10 x10*3/uL Final    LDH 12/15/2023 183  84 - 246 U/L Final    Case Report 12/15/2023    Final                    Value:Flow Cytometry                                    Case: [id number]                                   Authorizing Provider:  Stormy Johnson MD          Collected:           12/15/2023 1027              Ordering Location:     [facility] Received:            12/15/2023 [address] [place] [address]                                                                Pathologist:           Espinoza Donaldson MD PhD                                                       Specimen:    Blood, Venous                                                                              Diagnosis 12/15/2023    Final                    Value:This result contains rich text formatting which cannot be displayed here.      12/15/2023    Final                    Value:This result contains rich text formatting which cannot be displayed here.    Cell Populations 12/15/2023    Final                    Value:This result contains rich text formatting which cannot be displayed here.    Flow Differential 12/15/2023    Final                    Value:This result contains rich text formatting which cannot be displayed here.    Flow Test Ordered 12/15/2023 Low Grade Panel  not established Final    Specimen Viability 12/15/2023  Acceptable  not established Final    Cell Count 12/15/2023 9.40  not established x10*3/uL Final    Number of Cells Collected 12/15/2023 100,000.00  not established per tube Final    Methodology 12/15/2023    Final                    Value:This result contains rich text formatting which cannot be displayed here.    Total Protein 12/15/2023 6.2 (L)  6.4 - 8.2 g/dL Final    Albumin 12/15/2023 3.7  3.4 - 5.0 g/dL Final    Alpha 1 Globulin 12/15/2023 0.3  0.2 - 0.6 g/dL Final    Alpha 2 Globulin 12/15/2023 0.7  0.4 - 1.1 g/dL Final    Beta Globulin 12/15/2023 0.6  0.5 - 1.2 g/dL Final    Gamma 12/15/2023 0.8  0.5 - 1.4 g/dL Final    M-PROTEIN 1 12/15/2023 0.2 (H)    g/dL Final    Protein Electrophoresis Comment 12/15/2023 Aberrant band detected. See immunofixation.   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   Suggest further evaluation for the diagnosis of plasma cell dyscrasia.      Final    Path Review - Serum Protein Electr* 12/15/2023 Reviewed and approved by BRISEIDA SHAY on 12/21/23 at 9:11 AM.       Final    Path Review - Serum Immunofixation 12/15/2023 Reviewed and approved by BRISEIDA SHAY on 12/21/23 at 9:11 AM.       Final       Assessment/Plan

## 2024-01-26 NOTE — TELEPHONE ENCOUNTER
Yes, she did not think that he needed to be seen urgently.  She said to add on a statin if he was not already on.  I am going to order a stress test on him.  Otherwise if he is not having symptoms he can wait until July.

## 2024-01-26 NOTE — PROGRESS NOTES
Assessment/Plan   ASSESSMENT/PLAN:      Patient Instructions   1. Left otitis media, unspecified otitis media type  Try oral abx, if no improvement call the office  - amoxicillin-pot clavulanate (Augmentin) 875-125 mg tablet; Take 1 tablet (875 mg) by mouth 2 times a day for 10 days.  Dispense: 20 tablet; Refill: 0         Eboselumhen Iseghohi, DO     FUTURE DIRECTION:     Can try otic solution if no improvement  Subjective   SUBJECTIVE:     Patient ID: Michael Dougherty is a 78 y.o. malefor the following:    Chief Complaint   Patient presents with    Earache     L ear pain behind ear, travels down to L side neck x 2 days  NKI     Pain described as zapping  Similar to past ear infection   Started 2 days ago   Pain radiates to L neck   Denies fever or chills     Review of Systems    Allergies   Allergen Reactions    Ezetimibe Diarrhea    Fenofibrate Other    Niacin Other    Doxycycline Other         Current Outpatient Medications:     aspirin 81 mg EC tablet, Take 1 tablet (81 mg) by mouth once daily., Disp: , Rfl:     B complex-vitamin C-folic acid (Nephro-Anneliese) 0.8 mg tablet, Take 1 tablet by mouth once daily., Disp: , Rfl:     bisoprolol (Zebeta) 5 mg tablet, Take 1 tablet (5 mg) by mouth once daily., Disp: 90 tablet, Rfl: 3    brimonidine (AlphaGAN P) 0.2 % ophthalmic solution, Administer 1 drop into both eyes 2 times a day., Disp: , Rfl:     donepezil (Aricept) 5 mg tablet, Take 1 tablet (5 mg) by mouth once daily at bedtime., Disp: 30 tablet, Rfl: 11    fluticasone (Flonase) 50 mcg/actuation nasal spray, Administer 2 sprays into each nostril once daily. Shake gently. Before first use, prime pump. After use, clean tip and replace cap., Disp: 16 g, Rfl: 5    FreeStyle Lamine reader (FreeStyle Lamine 2 [place]) misc, Use as instructed, Disp: 1 each, Rfl: 0    FreeStyle Lamine sensor system (FreeStyle Lamine 2 Sensor) kit, Use as instructed, every 2 weeks, Disp: 1 each, Rfl: 0    hydroCHLOROthiazide (HYDRODiuril) 25 mg  "tablet, Take 1 tablet (25 mg) by mouth once daily., Disp: 90 tablet, Rfl: 3    insulin glargine (Basaglar KwikPen U-100 Insulin) 100 unit/mL (3 mL) pen, Inject 35 Units under the skin once daily at bedtime. Take as directed per insulin instructions., Disp: 31.5 mL, Rfl: 3    losartan (Cozaar) 100 mg tablet, Take 1 tablet (100 mg) by mouth once daily., Disp: 90 tablet, Rfl: 3    NovoLOG FlexPen U-100 Insulin 100 unit/mL (3 mL) pen, Inject 35 Units under the skin 3 times a day with meals. Take as directed per insulin instructions., Disp: 94.5 mL, Rfl: 3    OneTouch Ultra Test strip, USE 1 TEST STRIP 3 TIMES A DAY, Disp: 300 strip, Rfl: 3    pen needle, diabetic 32 gauge x 5/32\" needle, To be used TID, Disp: 300 each, Rfl: 3    rosuvastatin (Crestor) 40 mg tablet, Take 1 tablet (40 mg) by mouth once daily at bedtime., Disp: 90 tablet, Rfl: 3    timolol (Timoptic) 0.5 % ophthalmic solution, Administer 1 drop into both eyes 2 times a day., Disp: , Rfl:     amoxicillin-pot clavulanate (Augmentin) 875-125 mg tablet, Take 1 tablet (875 mg) by mouth 2 times a day for 10 days., Disp: 20 tablet, Rfl: 0     Patient Active Problem List   Diagnosis    Adverse effect of calcium-channel blocker    Atherosclerosis of aorta (CMS/HCC)    Cataracts, bilateral    Chondrocalcinosis of right knee    Coronary arteriosclerosis    Diabetes mellitus (CMS/HCC)    Gait abnormality    Heart murmur    Essential hypertension    Hyperglycemia due to type 2 diabetes mellitus (CMS/HCC)    Hyperlipidemia    Lesion of ulnar nerve, left upper limb    Localized primary osteoarthritis of carpometacarpal (CMC) joint of left wrist    Low back pain    Morbid obesity (CMS/HCC)    Nonadherence with dietary restriction    Stage 3a chronic kidney disease (CMS/HCC)    Sensorineural hearing loss (SNHL) of both ears    Anemia       Social History     Socioeconomic History    Marital status:      Spouse name: Not on file    Number of children: Not on file    " Years of education: Not on file    Highest education level: Not on file   Occupational History    Not on file   Tobacco Use    Smoking status: Former     Packs/day: 1     Types: Cigarettes    Smokeless tobacco: Never   Vaping Use    Vaping Use: Never used   Substance and Sexual Activity    Alcohol use: Not Currently    Drug use: Never    Sexual activity: Not on file   Other Topics Concern    Not on file   Social History Narrative    Not on file     Social Determinants of Health     Financial Resource Strain: Not on file   Food Insecurity: Not on file   Transportation Needs: Not on file   Physical Activity: Not on file   Stress: Not on file   Social Connections: Not on file   Intimate Partner Violence: Not on file   Housing Stability: Not on file       Objective   OBJECTIVE:     Vitals:    01/26/24 1452   BP: 124/72   Pulse: 56   Temp: 36.1 °C (97 °F)   SpO2: 97%       Exam      Physical Exam  Constitutional:       Appearance: Normal appearance.   HENT:      Head: Normocephalic.      Right Ear: There is impacted cerumen.      Left Ear: Swelling present. Tympanic membrane is erythematous.   Pulmonary:      Effort: Pulmonary effort is normal.   Musculoskeletal:      Cervical back: Normal range of motion.   Neurological:      Mental Status: He is alert.   Psychiatric:         Mood and Affect: Mood normal.

## 2024-02-01 ENCOUNTER — OFFICE VISIT (OUTPATIENT)
Dept: PRIMARY CARE | Facility: CLINIC | Age: 79
End: 2024-02-01
Payer: MEDICARE

## 2024-02-01 VITALS
WEIGHT: 239 LBS | OXYGEN SATURATION: 98 % | DIASTOLIC BLOOD PRESSURE: 64 MMHG | SYSTOLIC BLOOD PRESSURE: 114 MMHG | BODY MASS INDEX: 34.29 KG/M2 | HEART RATE: 62 BPM | TEMPERATURE: 97.3 F

## 2024-02-01 DIAGNOSIS — E11.9 TYPE 2 DIABETES MELLITUS WITHOUT COMPLICATION, WITH LONG-TERM CURRENT USE OF INSULIN (MULTI): ICD-10-CM

## 2024-02-01 DIAGNOSIS — H66.92 LEFT OTITIS MEDIA, UNSPECIFIED OTITIS MEDIA TYPE: Primary | ICD-10-CM

## 2024-02-01 DIAGNOSIS — Z79.4 TYPE 2 DIABETES MELLITUS WITHOUT COMPLICATION, WITH LONG-TERM CURRENT USE OF INSULIN (MULTI): ICD-10-CM

## 2024-02-01 PROCEDURE — 1036F TOBACCO NON-USER: CPT | Performed by: FAMILY MEDICINE

## 2024-02-01 PROCEDURE — 3074F SYST BP LT 130 MM HG: CPT | Performed by: FAMILY MEDICINE

## 2024-02-01 PROCEDURE — 1160F RVW MEDS BY RX/DR IN RCRD: CPT | Performed by: FAMILY MEDICINE

## 2024-02-01 PROCEDURE — 1126F AMNT PAIN NOTED NONE PRSNT: CPT | Performed by: FAMILY MEDICINE

## 2024-02-01 PROCEDURE — 99213 OFFICE O/P EST LOW 20 MIN: CPT | Performed by: FAMILY MEDICINE

## 2024-02-01 PROCEDURE — 1159F MED LIST DOCD IN RCRD: CPT | Performed by: FAMILY MEDICINE

## 2024-02-01 PROCEDURE — 3078F DIAST BP <80 MM HG: CPT | Performed by: FAMILY MEDICINE

## 2024-02-01 RX ORDER — INSULIN GLARGINE 100 [IU]/ML
64 INJECTION, SOLUTION SUBCUTANEOUS NIGHTLY
Qty: 57.6 ML | Refills: 3 | Status: SHIPPED | OUTPATIENT
Start: 2024-02-01 | End: 2024-03-21 | Stop reason: ALTCHOICE

## 2024-02-01 NOTE — PROGRESS NOTES
Subjective   Patient ID: Michael Dougherty is a 78 y.o. male who presents for Earache (Recheck L ear pain x5 days. ).  Earache      patient presents because he has been having some zapping sensations in his left ear.  He had a ear infection that was treated last week with Augmentin.  Still is finishing his course of Augmentin.  Pain in the ear is less but he still gets occasional zapping sensations in his left ear.  Reports he had similar issues like this in the past about 5 years ago.  Looking back at old notes there is some thought that there could be some neurologist there.  They seem to go away by himself.    Patient Active Problem List   Diagnosis    Adverse effect of calcium-channel blocker    Atherosclerosis of aorta (CMS/HCC)    Cataracts, bilateral    Chondrocalcinosis of right knee    Coronary arteriosclerosis    Diabetes mellitus (CMS/HCC)    Gait abnormality    Heart murmur    Essential hypertension    Hyperglycemia due to type 2 diabetes mellitus (CMS/HCC)    Hyperlipidemia    Lesion of ulnar nerve, left upper limb    Localized primary osteoarthritis of carpometacarpal (CMC) joint of left wrist    Low back pain    Morbid obesity (CMS/HCC)    Nonadherence with dietary restriction    Stage 3a chronic kidney disease (CMS/HCC)    Sensorineural hearing loss (SNHL) of both ears    Anemia       Social Connections: Not on file       Current Outpatient Medications on File Prior to Visit   Medication Sig Dispense Refill    amoxicillin-pot clavulanate (Augmentin) 875-125 mg tablet Take 1 tablet (875 mg) by mouth 2 times a day for 10 days. 20 tablet 0    aspirin 81 mg EC tablet Take 1 tablet (81 mg) by mouth once daily.      B complex-vitamin C-folic acid (Nephro-Anneliese) 0.8 mg tablet Take 1 tablet by mouth once daily.      bisoprolol (Zebeta) 5 mg tablet Take 1 tablet (5 mg) by mouth once daily. 90 tablet 3    brimonidine (AlphaGAN P) 0.2 % ophthalmic solution Administer 1 drop into both eyes 2 times a day.       "donepezil (Aricept) 5 mg tablet Take 1 tablet (5 mg) by mouth once daily at bedtime. 30 tablet 11    fluticasone (Flonase) 50 mcg/actuation nasal spray Administer 2 sprays into each nostril once daily. Shake gently. Before first use, prime pump. After use, clean tip and replace cap. 16 g 5    FreeStyle Lamine reader (FreeStyle Lamine 2 [place]) misc Use as instructed 1 each 0    FreeStyle Lamine sensor system (FreeStyle Lamine 2 Sensor) kit Use as instructed, every 2 weeks 1 each 0    hydroCHLOROthiazide (HYDRODiuril) 25 mg tablet Take 1 tablet (25 mg) by mouth once daily. 90 tablet 3    losartan (Cozaar) 100 mg tablet Take 1 tablet (100 mg) by mouth once daily. 90 tablet 3    NovoLOG FlexPen U-100 Insulin 100 unit/mL (3 mL) pen Inject 35 Units under the skin 3 times a day with meals. Take as directed per insulin instructions. 94.5 mL 3    OneTouch Ultra Test strip USE 1 TEST STRIP 3 TIMES A  strip 3    pen needle, diabetic 32 gauge x 5/32\" needle To be used  each 3    rosuvastatin (Crestor) 40 mg tablet Take 1 tablet (40 mg) by mouth once daily at bedtime. 90 tablet 3    timolol (Timoptic) 0.5 % ophthalmic solution Administer 1 drop into both eyes 2 times a day.      [DISCONTINUED] insulin glargine (Basaglar KwikPen U-100 Insulin) 100 unit/mL (3 mL) pen Inject 35 Units under the skin once daily at bedtime. Take as directed per insulin instructions. 31.5 mL 3     No current facility-administered medications on file prior to visit.        Vitals:    02/01/24 1456   BP: 114/64   Pulse: 62   Temp: 36.3 °C (97.3 °F)   SpO2: 98%     Vitals:    02/01/24 1456   Weight: 108 kg (239 lb)       Review of Systems   HENT:  Positive for ear pain.    All other systems reviewed and are negative.      Objective     Physical Exam  Vitals reviewed.   Constitutional:       General: He is not in acute distress.     Appearance: Normal appearance. He is well-developed. He is not diaphoretic.   HENT:      Head: Normocephalic and " atraumatic.      Right Ear: Tympanic membrane normal.      Left Ear: Tympanic membrane normal.      Nose: Nose normal.      Mouth/Throat:      Mouth: Mucous membranes are moist.   Eyes:      Pupils: Pupils are equal, round, and reactive to light.   Cardiovascular:      Rate and Rhythm: Normal rate and regular rhythm.      Heart sounds: Normal heart sounds. No murmur heard.     No friction rub. No gallop.   Pulmonary:      Effort: Pulmonary effort is normal.      Breath sounds: Normal breath sounds. No rales.   Abdominal:      General: Bowel sounds are normal.      Palpations: Abdomen is soft.      Tenderness: There is no abdominal tenderness.   Musculoskeletal:      Cervical back: Normal range of motion and neck supple.   Skin:     General: Skin is warm and dry.   Neurological:      Mental Status: He is alert.   Psychiatric:         Mood and Affect: Mood normal.         Office Visit on 12/15/2023   Component Date Value Ref Range Status    WBC 12/15/2023 9.4  4.4 - 11.3 x10*3/uL Final    RBC 12/15/2023 4.10 (L)  4.50 - 5.90 x10*6/uL Final    Hemoglobin 12/15/2023 11.2 (L)  13.5 - 17.5 g/dL Final    Hematocrit 12/15/2023 34.2 (L)  41.0 - 52.0 % Final    MCV 12/15/2023 83  80 - 100 fL Final    MCH 12/15/2023 27.3  26.0 - 34.0 pg Final    MCHC 12/15/2023 32.7  32.0 - 36.0 g/dL Final    RDW 12/15/2023 15.6 (H)  11.5 - 14.5 % Final    Platelets 12/15/2023 98 (L)  150 - 450 x10*3/uL Final    Neutrophils % 12/15/2023 50.6  40.0 - 80.0 % Final    Immature Granulocytes %, Automated 12/15/2023 0.1  0.0 - 0.9 % Final    Immature Granulocyte Count (IG) includes promyelocytes, myelocytes and metamyelocytes but does not include bands. Percent differential counts (%) should be interpreted in the context of the absolute cell counts (cells/UL).    Lymphocytes % 12/15/2023 39.7  13.0 - 44.0 % Final    Monocytes % 12/15/2023 6.6  2.0 - 10.0 % Final    Eosinophils % 12/15/2023 2.4  0.0 - 6.0 % Final    Basophils % 12/15/2023 0.6  0.0 -  2.0 % Final    Neutrophils Absolute 12/15/2023 4.76  1.60 - 5.50 x10*3/uL Final    Percent differential counts (%) should be interpreted in the context of the absolute cell counts (cells/uL).    Immature Granulocytes Absolute, Au* 12/15/2023 0.01  0.00 - 0.50 x10*3/uL Final    Lymphocytes Absolute 12/15/2023 3.74 (H)  0.80 - 3.00 x10*3/uL Final    Monocytes Absolute 12/15/2023 0.62  0.05 - 0.80 x10*3/uL Final    Eosinophils Absolute 12/15/2023 0.23  0.00 - 0.40 x10*3/uL Final    Basophils Absolute 12/15/2023 0.06  0.00 - 0.10 x10*3/uL Final    LDH 12/15/2023 183  84 - 246 U/L Final    Case Report 12/15/2023    Final                    Value:Flow Cytometry                                    Case: [id number]                                   Authorizing Provider:  Stormy Johnson MD          Collected:           12/15/2023 1027              Ordering Location:     [facility] Received:            12/15/2023 [address] [place] [address]                                                                Pathologist:           Espinoza Donaldson MD PhD                                                       Specimen:    Blood, Venous                                                                              Diagnosis 12/15/2023    Final                    Value:This result contains rich text formatting which cannot be displayed here.      12/15/2023    Final                    Value:This result contains rich text formatting which cannot be displayed here.    Cell Populations 12/15/2023    Final                    Value:This result contains rich text formatting which cannot be displayed here.    Flow Differential 12/15/2023    Final                    Value:This result contains rich text formatting which cannot be displayed here.    Flow Test Ordered 12/15/2023 Low Grade Panel  not established Final    Specimen Viability 12/15/2023 Acceptable  not established Final    Cell Count 12/15/2023  9.40  not established x10*3/uL Final    Number of Cells Collected 12/15/2023 100,000.00  not established per tube Final    Methodology 12/15/2023    Final                    Value:This result contains rich text formatting which cannot be displayed here.    Total Protein 12/15/2023 6.2 (L)  6.4 - 8.2 g/dL Final    Albumin 12/15/2023 3.7  3.4 - 5.0 g/dL Final    Alpha 1 Globulin 12/15/2023 0.3  0.2 - 0.6 g/dL Final    Alpha 2 Globulin 12/15/2023 0.7  0.4 - 1.1 g/dL Final    Beta Globulin 12/15/2023 0.6  0.5 - 1.2 g/dL Final    Gamma 12/15/2023 0.8  0.5 - 1.4 g/dL Final    M-PROTEIN 1 12/15/2023 0.2 (H)    g/dL Final    Protein Electrophoresis Comment 12/15/2023 Aberrant band detected. See immunofixation.   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   Suggest further evaluation for the diagnosis of plasma cell dyscrasia.      Final    Path Review - Serum Protein Electr* 12/15/2023 Reviewed and approved by BRISEIDA SHAY on 12/21/23 at 9:11 AM.       Final    Path Review - Serum Immunofixation 12/15/2023 Reviewed and approved by BRISEIDA SHAY on 12/21/23 at 9:11 AM.       Final       Assessment/Plan   Problem List Items Addressed This Visit             ICD-10-CM    Diabetes mellitus (CMS/[facility]) E11.9    Relevant Medications    insulin glargine (Basaglar KwikPen U-100 Insulin) 100 unit/mL (3 mL) pen     Other Visit Diagnoses         Codes    Left otitis media, unspecified otitis media type    -  Primary H66.92          Finish course of Augmentin.  Suspect there might be some irritation with his nerve.  We will do watchful waiting, suspect he will improve by himself.

## 2024-02-05 ENCOUNTER — APPOINTMENT (OUTPATIENT)
Dept: PRIMARY CARE | Facility: CLINIC | Age: 79
End: 2024-02-05
Payer: MEDICARE

## 2024-02-20 ENCOUNTER — TELEPHONE (OUTPATIENT)
Dept: PRIMARY CARE | Facility: CLINIC | Age: 79
End: 2024-02-20
Payer: MEDICARE

## 2024-02-20 NOTE — TELEPHONE ENCOUNTER
Nadya called stating pt is seeing othro on Thursday and getting a predisone shot, he also has a nuclear stress test on Friday.     Is that ok, to do back to back?  Please advise Thx

## 2024-02-23 ENCOUNTER — HOSPITAL ENCOUNTER (OUTPATIENT)
Dept: RADIOLOGY | Facility: HOSPITAL | Age: 79
Discharge: HOME | End: 2024-02-23
Payer: MEDICARE

## 2024-02-23 ENCOUNTER — HOSPITAL ENCOUNTER (OUTPATIENT)
Dept: CARDIOLOGY | Facility: HOSPITAL | Age: 79
Discharge: HOME | End: 2024-02-23
Payer: MEDICARE

## 2024-02-23 DIAGNOSIS — I25.10 CORONARY ARTERY DISEASE INVOLVING NATIVE HEART WITHOUT ANGINA PECTORIS, UNSPECIFIED VESSEL OR LESION TYPE: ICD-10-CM

## 2024-02-23 PROCEDURE — 78452 HT MUSCLE IMAGE SPECT MULT: CPT

## 2024-02-23 PROCEDURE — 78452 HT MUSCLE IMAGE SPECT MULT: CPT | Performed by: STUDENT IN AN ORGANIZED HEALTH CARE EDUCATION/TRAINING PROGRAM

## 2024-02-23 PROCEDURE — 93017 CV STRESS TEST TRACING ONLY: CPT

## 2024-02-23 PROCEDURE — 3430000001 HC RX 343 DIAGNOSTIC RADIOPHARMACEUTICALS: Performed by: STUDENT IN AN ORGANIZED HEALTH CARE EDUCATION/TRAINING PROGRAM

## 2024-02-23 PROCEDURE — A9502 TC99M TETROFOSMIN: HCPCS | Performed by: STUDENT IN AN ORGANIZED HEALTH CARE EDUCATION/TRAINING PROGRAM

## 2024-02-23 PROCEDURE — 2500000004 HC RX 250 GENERAL PHARMACY W/ HCPCS (ALT 636 FOR OP/ED): Performed by: FAMILY MEDICINE

## 2024-02-23 PROCEDURE — 93016 CV STRESS TEST SUPVJ ONLY: CPT | Performed by: STUDENT IN AN ORGANIZED HEALTH CARE EDUCATION/TRAINING PROGRAM

## 2024-02-23 RX ORDER — REGADENOSON 0.08 MG/ML
0.4 INJECTION, SOLUTION INTRAVENOUS ONCE
Status: COMPLETED | OUTPATIENT
Start: 2024-02-23 | End: 2024-02-23

## 2024-02-23 RX ADMIN — TETROFOSMIN 30 MILLICURIE: 0.23 INJECTION, POWDER, LYOPHILIZED, FOR SOLUTION INTRAVENOUS at 13:25

## 2024-02-23 RX ADMIN — REGADENOSON 0.4 MG: 0.08 INJECTION, SOLUTION INTRAVENOUS at 13:26

## 2024-02-23 RX ADMIN — TETROFOSMIN 10 MILLICURIE: 0.23 INJECTION, POWDER, LYOPHILIZED, FOR SOLUTION INTRAVENOUS at 11:50

## 2024-02-26 ENCOUNTER — TELEPHONE (OUTPATIENT)
Dept: PRIMARY CARE | Facility: CLINIC | Age: 79
End: 2024-02-26
Payer: MEDICARE

## 2024-02-26 NOTE — TELEPHONE ENCOUNTER
----- Message from Ulices Chaudhary MD sent at 2/26/2024  1:25 PM EST -----  Pts stress test was negative for ischemia.  Has his cardiologist suggested what to do about the aortic stenosis that he has?

## 2024-02-26 NOTE — RESULT ENCOUNTER NOTE
Pts stress test was negative for ischemia.  Has his cardiologist suggested what to do about the aortic stenosis that he has?

## 2024-02-27 NOTE — TELEPHONE ENCOUNTER
Pt wife states he is seeing them in June/July, if they hear any different before next appointment will let [facility] know

## 2024-03-04 ENCOUNTER — APPOINTMENT (OUTPATIENT)
Dept: HEMATOLOGY/ONCOLOGY | Facility: CLINIC | Age: 79
End: 2024-03-04
Payer: MEDICARE

## 2024-03-12 DIAGNOSIS — M19.032 LOCALIZED PRIMARY OSTEOARTHRITIS OF CARPOMETACARPAL (CMC) JOINT OF LEFT WRIST: ICD-10-CM

## 2024-03-14 ENCOUNTER — HOSPITAL ENCOUNTER (OUTPATIENT)
Dept: RADIOLOGY | Facility: HOSPITAL | Age: 79
Discharge: HOME | End: 2024-03-14
Payer: MEDICARE

## 2024-03-14 ENCOUNTER — OFFICE VISIT (OUTPATIENT)
Dept: ORTHOPEDIC SURGERY | Facility: CLINIC | Age: 79
End: 2024-03-14
Payer: MEDICARE

## 2024-03-14 DIAGNOSIS — M19.032 LOCALIZED PRIMARY OSTEOARTHRITIS OF CARPOMETACARPAL (CMC) JOINT OF LEFT WRIST: ICD-10-CM

## 2024-03-14 DIAGNOSIS — M19.032 LOCALIZED PRIMARY OSTEOARTHRITIS OF CARPOMETACARPAL (CMC) JOINT OF LEFT WRIST: Primary | ICD-10-CM

## 2024-03-14 PROCEDURE — 1160F RVW MEDS BY RX/DR IN RCRD: CPT | Performed by: ORTHOPAEDIC SURGERY

## 2024-03-14 PROCEDURE — 99214 OFFICE O/P EST MOD 30 MIN: CPT | Performed by: ORTHOPAEDIC SURGERY

## 2024-03-14 PROCEDURE — 73110 X-RAY EXAM OF WRIST: CPT | Mod: LT

## 2024-03-14 PROCEDURE — 1036F TOBACCO NON-USER: CPT | Performed by: ORTHOPAEDIC SURGERY

## 2024-03-14 PROCEDURE — 73110 X-RAY EXAM OF WRIST: CPT | Mod: LEFT SIDE | Performed by: RADIOLOGY

## 2024-03-14 PROCEDURE — 20600 DRAIN/INJ JOINT/BURSA W/O US: CPT | Performed by: ORTHOPAEDIC SURGERY

## 2024-03-14 PROCEDURE — 1159F MED LIST DOCD IN RCRD: CPT | Performed by: ORTHOPAEDIC SURGERY

## 2024-03-14 RX ORDER — TRIAMCINOLONE ACETONIDE 40 MG/ML
40 INJECTION, SUSPENSION INTRA-ARTICULAR; INTRAMUSCULAR
Status: COMPLETED | OUTPATIENT
Start: 2024-03-14 | End: 2024-03-14

## 2024-03-14 RX ORDER — LIDOCAINE HYDROCHLORIDE 10 MG/ML
1 INJECTION INFILTRATION; PERINEURAL
Status: COMPLETED | OUTPATIENT
Start: 2024-03-14 | End: 2024-03-14

## 2024-03-14 RX ADMIN — TRIAMCINOLONE ACETONIDE 40 MG: 40 INJECTION, SUSPENSION INTRA-ARTICULAR; INTRAMUSCULAR at 11:08

## 2024-03-14 RX ADMIN — LIDOCAINE HYDROCHLORIDE 1 ML: 10 INJECTION INFILTRATION; PERINEURAL at 11:08

## 2024-03-14 NOTE — PROGRESS NOTES
78 y.o. male presents today for follow up left base of thumb pain. The patient reports symptoms for years, relieved with shots, last one about 4 months ago. Pain is controlled. Patient reports no numbness and tingling.  Reports no previous surgeries or trauma to the area.  Reports pain worse with use, better at rest.  Pain dull ache, sharp at times. Would like another shot.     Review of Systems    Constitutional: no fever, no chills, not feeling tired, no recent weight gain and no recent weight loss.   ENT: no nosebleeds.   Cardiovascular: no chest pain.   Respiratory: no shortness of breath and no cough.   Gastrointestinal: no abdominal pain, no nausea, no vomiting and no diarrhea.   Musculoskeletal: per HPI  Integumentary: no rashes and no skin wound.   Neurological: no headache.   Psychiatric: no depression and no sleep disturbances.   Endocrine: no muscle weakness and no muscle cramps.   Hematologic/Lymphatic: no swollen glands and no tendency for easy bruising.     All other systems have been reviewed and are negative for complaint    Patient's past medical history, past surgical history, allergies, and medications have been reviewed unless otherwise noted in the chart.     CMC Arthritis Exam  Inspection:  no evidence of infection, no edema, no erythema, no ecchymosis, Palpation:  compartments are soft, pain with palpation over the Thumb CMC joint, Range of Motion:  full wrist and finger range of motion, Stability:  no wrist or finger instability detected, Strength:  5/5  and pinch strength, Skin:  intact, Vascular:  capillary refill <2 seconds distally, Sensation:  intact to light touch distally, Tests:  negative Finkelstein's , positive Thumb CMC Grind.      Constitutional   General appearance: Alert and in no acute distress. Well developed, well nourished.    Eyes   External Eye, Conjunctiva and lids: Normal external exam - pupils were equal in size, round, reactive to light (PERRL) with normal  accommodation and extraocular movements intact (EOMI).   Ears, Nose, Mouth, and Throat   Hearing: Normal.   Neck   Neck: No neck mass was observed. Supple.   Pulmonary   Respiratory effort: No respiratory distress.   Cardiovascular   Examination of extremities: No peripheral edema.   Abdomen   Abdomen: Soft nontender; no abdominal mass palpated.. No rebound, rigidity or guarding.    Skin   Skin and subcutaneous tissue: Normal skin color and pigmentation, normal skin turgor, and no rash.   Neurologic   Sensation: Normal.   Psychiatric   Judgment and insight: Intact.   Orientation to person, place, and time: Alert and oriented x 3.       Mood and affect: Normal.      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 After thorough discussion, the patient has elected to proceed with conservative management. All questions were answered to the patients satisfaction who seems satisfied with the plan.      Discussion I discussed the diagnosis and treatment options with the patient today along with their associated risks and benefits. After thorough discussion, the patient has elected to proceed with a corticosteroid injection with Kenalog and Xylocaine, which was performed in the office today under aseptic technique and the patient tolerated the procedure well.          Ortho Injections:           Injection site left thumb CMC. Medication 1 cc 1% Xylocaine, 1 cc 40 mg Kenalog, Injection given under sterile conditions. No immediate complications noted. Post injection instructions given.  FU 4 months prn - no XR  Patient ID: Michael Dougherty is a 78 y.o. male.    S Inj/Asp: L thumb CMC on 3/14/2024 11:08 AM  Indications: pain  Details: 25 G needle (dorsoradial) approach  Medications: 40 mg triamcinolone acetonide 40 mg/mL; 1 mL lidocaine 10 mg/mL (1 %)  Outcome: tolerated well, no immediate  complications  Procedure, treatment alternatives, risks and benefits explained, specific risks discussed. Consent was given by the patient. Immediately prior to procedure a time out was called to verify the correct patient, procedure, equipment, support staff and site/side marked as required. Patient was prepped and draped in the usual sterile fashion.

## 2024-03-15 ENCOUNTER — LAB (OUTPATIENT)
Dept: LAB | Facility: LAB | Age: 79
End: 2024-03-15
Payer: MEDICARE

## 2024-03-15 DIAGNOSIS — Z79.4 TYPE 2 DIABETES MELLITUS WITHOUT COMPLICATION, WITH LONG-TERM CURRENT USE OF INSULIN (MULTI): ICD-10-CM

## 2024-03-15 DIAGNOSIS — E11.9 TYPE 2 DIABETES MELLITUS WITHOUT COMPLICATION, WITH LONG-TERM CURRENT USE OF INSULIN (MULTI): ICD-10-CM

## 2024-03-15 DIAGNOSIS — Z12.5 SCREENING FOR PROSTATE CANCER: ICD-10-CM

## 2024-03-15 DIAGNOSIS — D72.829 LEUKOCYTOSIS, UNSPECIFIED TYPE: ICD-10-CM

## 2024-03-15 LAB
ALBUMIN SERPL BCP-MCNC: 4 G/DL (ref 3.4–5)
ALP SERPL-CCNC: 63 U/L (ref 33–136)
ALT SERPL W P-5'-P-CCNC: 18 U/L (ref 10–52)
ANION GAP SERPL CALC-SCNC: 11 MMOL/L (ref 10–20)
AST SERPL W P-5'-P-CCNC: 19 U/L (ref 9–39)
BASOPHILS # BLD AUTO: 0.04 X10*3/UL (ref 0–0.1)
BASOPHILS NFR BLD AUTO: 0.3 %
BILIRUB SERPL-MCNC: 0.9 MG/DL (ref 0–1.2)
BUN SERPL-MCNC: 26 MG/DL (ref 6–23)
CALCIUM SERPL-MCNC: 9.2 MG/DL (ref 8.6–10.3)
CHLORIDE SERPL-SCNC: 106 MMOL/L (ref 98–107)
CHOLEST SERPL-MCNC: 110 MG/DL (ref 0–199)
CHOLESTEROL/HDL RATIO: 3.3
CO2 SERPL-SCNC: 25 MMOL/L (ref 21–32)
CREAT SERPL-MCNC: 1.34 MG/DL (ref 0.5–1.3)
EGFRCR SERPLBLD CKD-EPI 2021: 55 ML/MIN/1.73M*2
EOSINOPHIL # BLD AUTO: 0.02 X10*3/UL (ref 0–0.4)
EOSINOPHIL NFR BLD AUTO: 0.1 %
ERYTHROCYTE [DISTWIDTH] IN BLOOD BY AUTOMATED COUNT: 14.9 % (ref 11.5–14.5)
EST. AVERAGE GLUCOSE BLD GHB EST-MCNC: 131 MG/DL
GLUCOSE SERPL-MCNC: 187 MG/DL (ref 74–99)
HBA1C MFR BLD: 6.2 %
HCT VFR BLD AUTO: 36.1 % (ref 41–52)
HDLC SERPL-MCNC: 32.9 MG/DL
HGB BLD-MCNC: 12 G/DL (ref 13.5–17.5)
IMM GRANULOCYTES # BLD AUTO: 0.07 X10*3/UL (ref 0–0.5)
IMM GRANULOCYTES NFR BLD AUTO: 0.5 % (ref 0–0.9)
LDLC SERPL CALC-MCNC: 57 MG/DL
LYMPHOCYTES # BLD AUTO: 3.82 X10*3/UL (ref 0.8–3)
LYMPHOCYTES NFR BLD AUTO: 27.7 %
MCH RBC QN AUTO: 27.6 PG (ref 26–34)
MCHC RBC AUTO-ENTMCNC: 33.2 G/DL (ref 32–36)
MCV RBC AUTO: 83 FL (ref 80–100)
MONOCYTES # BLD AUTO: 0.73 X10*3/UL (ref 0.05–0.8)
MONOCYTES NFR BLD AUTO: 5.3 %
NEUTROPHILS # BLD AUTO: 9.1 X10*3/UL (ref 1.6–5.5)
NEUTROPHILS NFR BLD AUTO: 66.1 %
NON HDL CHOLESTEROL: 77 MG/DL (ref 0–149)
NRBC BLD-RTO: 0 /100 WBCS (ref 0–0)
PLATELET # BLD AUTO: 124 X10*3/UL (ref 150–450)
POTASSIUM SERPL-SCNC: 4.1 MMOL/L (ref 3.5–5.3)
PROT SERPL-MCNC: 6.5 G/DL (ref 6.4–8.2)
PSA SERPL-MCNC: 1.62 NG/ML
RBC # BLD AUTO: 4.34 X10*6/UL (ref 4.5–5.9)
SODIUM SERPL-SCNC: 138 MMOL/L (ref 136–145)
TRIGL SERPL-MCNC: 101 MG/DL (ref 0–149)
VLDL: 20 MG/DL (ref 0–40)
WBC # BLD AUTO: 13.8 X10*3/UL (ref 4.4–11.3)

## 2024-03-15 PROCEDURE — G0103 PSA SCREENING: HCPCS

## 2024-03-15 PROCEDURE — 36415 COLL VENOUS BLD VENIPUNCTURE: CPT

## 2024-03-15 PROCEDURE — 83036 HEMOGLOBIN GLYCOSYLATED A1C: CPT

## 2024-03-15 PROCEDURE — 85025 COMPLETE CBC W/AUTO DIFF WBC: CPT

## 2024-03-15 PROCEDURE — 80061 LIPID PANEL: CPT

## 2024-03-15 PROCEDURE — 80053 COMPREHEN METABOLIC PANEL: CPT

## 2024-03-21 ENCOUNTER — OFFICE VISIT (OUTPATIENT)
Dept: PRIMARY CARE | Facility: CLINIC | Age: 79
End: 2024-03-21
Payer: MEDICARE

## 2024-03-21 VITALS
WEIGHT: 235 LBS | HEART RATE: 93 BPM | DIASTOLIC BLOOD PRESSURE: 70 MMHG | OXYGEN SATURATION: 98 % | SYSTOLIC BLOOD PRESSURE: 142 MMHG | TEMPERATURE: 97.1 F | BODY MASS INDEX: 33.72 KG/M2

## 2024-03-21 DIAGNOSIS — E11.9 DIABETES MELLITUS TREATED WITH INSULIN (MULTI): ICD-10-CM

## 2024-03-21 DIAGNOSIS — I10 PRIMARY HYPERTENSION: ICD-10-CM

## 2024-03-21 DIAGNOSIS — F03.90 DEMENTIA WITHOUT BEHAVIORAL DISTURBANCE, PSYCHOTIC DISTURBANCE, MOOD DISTURBANCE, OR ANXIETY, UNSPECIFIED DEMENTIA SEVERITY, UNSPECIFIED DEMENTIA TYPE (MULTI): ICD-10-CM

## 2024-03-21 DIAGNOSIS — Z79.4 TYPE 2 DIABETES MELLITUS WITHOUT COMPLICATION, WITH LONG-TERM CURRENT USE OF INSULIN (MULTI): ICD-10-CM

## 2024-03-21 DIAGNOSIS — Z79.4 DIABETES MELLITUS TREATED WITH INSULIN (MULTI): ICD-10-CM

## 2024-03-21 DIAGNOSIS — E78.2 MIXED HYPERLIPIDEMIA: ICD-10-CM

## 2024-03-21 DIAGNOSIS — E11.9 TYPE 2 DIABETES MELLITUS WITHOUT COMPLICATION, WITH LONG-TERM CURRENT USE OF INSULIN (MULTI): ICD-10-CM

## 2024-03-21 PROCEDURE — 3077F SYST BP >= 140 MM HG: CPT | Performed by: FAMILY MEDICINE

## 2024-03-21 PROCEDURE — 1159F MED LIST DOCD IN RCRD: CPT | Performed by: FAMILY MEDICINE

## 2024-03-21 PROCEDURE — 1036F TOBACCO NON-USER: CPT | Performed by: FAMILY MEDICINE

## 2024-03-21 PROCEDURE — 99214 OFFICE O/P EST MOD 30 MIN: CPT | Performed by: FAMILY MEDICINE

## 2024-03-21 PROCEDURE — 3078F DIAST BP <80 MM HG: CPT | Performed by: FAMILY MEDICINE

## 2024-03-21 PROCEDURE — 1160F RVW MEDS BY RX/DR IN RCRD: CPT | Performed by: FAMILY MEDICINE

## 2024-03-21 RX ORDER — INSULIN ASPART 100 [IU]/ML
35 INJECTION, SOLUTION INTRAVENOUS; SUBCUTANEOUS
Qty: 94.5 ML | Refills: 3 | Status: SHIPPED | OUTPATIENT
Start: 2024-03-21 | End: 2024-04-21 | Stop reason: ALTCHOICE

## 2024-03-21 RX ORDER — ROSUVASTATIN CALCIUM 40 MG/1
40 TABLET, COATED ORAL NIGHTLY
Qty: 90 TABLET | Refills: 3 | Status: SHIPPED | OUTPATIENT
Start: 2024-03-21 | End: 2025-03-21

## 2024-03-21 RX ORDER — DONEPEZIL HYDROCHLORIDE 5 MG/1
5 TABLET, FILM COATED ORAL NIGHTLY
Qty: 30 TABLET | Refills: 11 | Status: SHIPPED | OUTPATIENT
Start: 2024-03-21 | End: 2024-05-01 | Stop reason: SDUPTHER

## 2024-03-21 RX ORDER — INSULIN ADMIN. SUPPLIES
INSULIN PEN (EA) SUBCUTANEOUS
Qty: 400 EACH | Refills: 3 | Status: SHIPPED | OUTPATIENT
Start: 2024-03-21

## 2024-03-21 RX ORDER — HYDROCHLOROTHIAZIDE 25 MG/1
25 TABLET ORAL DAILY
Qty: 90 TABLET | Refills: 3 | Status: SHIPPED | OUTPATIENT
Start: 2024-03-21 | End: 2025-03-21

## 2024-03-21 RX ORDER — BISOPROLOL FUMARATE 5 MG/1
5 TABLET, FILM COATED ORAL DAILY
Qty: 90 TABLET | Refills: 3 | Status: SHIPPED | OUTPATIENT
Start: 2024-03-21 | End: 2025-03-21

## 2024-03-21 RX ORDER — INSULIN DEGLUDEC 200 U/ML
25 INJECTION, SOLUTION SUBCUTANEOUS NIGHTLY
Qty: 11.7 ML | Refills: 3 | Status: SHIPPED | OUTPATIENT
Start: 2024-03-21 | End: 2024-04-29 | Stop reason: SDUPTHER

## 2024-03-21 RX ORDER — FLASH GLUCOSE SENSOR
KIT MISCELLANEOUS
Qty: 1 EACH | Refills: 0 | Status: SHIPPED | OUTPATIENT
Start: 2024-03-21

## 2024-03-21 RX ORDER — LOSARTAN POTASSIUM 100 MG/1
100 TABLET ORAL DAILY
Qty: 90 TABLET | Refills: 3 | Status: SHIPPED | OUTPATIENT
Start: 2024-03-21 | End: 2025-03-21

## 2024-03-21 NOTE — PROGRESS NOTES
Subjective   Patient ID: Michael Dougherty is a 78 y.o. male who presents for Diabetes, Hypertension, and Hyperlipidemia (Review BW).  HPI    HTN: well controlled    DM2: a1c was 6.2 this time which is an improvement.  He has been having difficulty with getting a low blood sugars on the Basaglar.  They have reduced his dose significantly and is still getting some borderline blood sugars especially at night.  His wife wonders if they might be up to try Tresiba which his daughters have used without issue.  He has not been using the jarad monitor.    CRI: Kidneys were stable on blood    Dementia: has been having worsening cognition in recent years.  Forgets tasks, directions.  Needs more help doing finances.  Mother had Alzheimers.        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      B complex-vitamin C-folic acid (Nephro-Anneliese) 0.8 mg tablet Take 1 tablet by mouth once daily.      bisoprolol (Zebeta) 5 mg tablet Take 1 tablet (5 mg) by mouth once daily. 90 tablet 3    brimonidine (AlphaGAN P) 0.2 % ophthalmic solution Administer 1 drop into both eyes 2 times a day.      donepezil  "(Aricept) 5 mg tablet Take 1 tablet (5 mg) by mouth once daily at bedtime. 30 tablet 11    fluticasone (Flonase) 50 mcg/actuation nasal spray Administer 2 sprays into each nostril once daily. Shake gently. Before first use, prime pump. After use, clean tip and replace cap. 16 g 5    FreeStyle Lamine reader (FreeStyle Lamine 2 Alvarado) misc Use as instructed 1 each 0    FreeStyle Lamine sensor system (FreeStyle Lamine 2 Sensor) kit Use as instructed, every 2 weeks 1 each 0    hydroCHLOROthiazide (HYDRODiuril) 25 mg tablet Take 1 tablet (25 mg) by mouth once daily. 90 tablet 3    insulin glargine (Basaglar KwikPen U-100 Insulin) 100 unit/mL (3 mL) pen Inject 64 Units under the skin once daily at bedtime. Take as directed per insulin instructions. 57.6 mL 3    losartan (Cozaar) 100 mg tablet Take 1 tablet (100 mg) by mouth once daily. 90 tablet 3    NovoLOG FlexPen U-100 Insulin 100 unit/mL (3 mL) pen Inject 35 Units under the skin 3 times a day with meals. Take as directed per insulin instructions. 94.5 mL 3    OneTouch Ultra Test strip USE 1 TEST STRIP 3 TIMES A  strip 3    pen needle, diabetic 32 gauge x 5/32\" needle To be used  each 3    rosuvastatin (Crestor) 40 mg tablet Take 1 tablet (40 mg) by mouth once daily at bedtime. 90 tablet 3    timolol (Timoptic) 0.5 % ophthalmic solution Administer 1 drop into both eyes 2 times a day.       No current facility-administered medications on file prior to visit.        Vitals:    03/21/24 1343   BP: 142/70   Pulse: 93   Temp: 36.2 °C (97.1 °F)   SpO2: 98%     Vitals:    03/21/24 1343   Weight: 107 kg (235 lb)       Review of Systems   All other systems reviewed and are negative.      Objective     Physical Exam  Constitutional:       Appearance: Normal appearance. He is well-developed.   HENT:      Head: Atraumatic.   Cardiovascular:      Rate and Rhythm: Normal rate and regular rhythm.      Heart sounds: Normal heart sounds. No murmur heard.  Pulmonary:      " Effort: Pulmonary effort is normal.      Breath sounds: Normal breath sounds.   Abdominal:      General: Bowel sounds are normal.      Palpations: Abdomen is soft.   Skin:     General: Skin is warm.   Neurological:      General: No focal deficit present.      Mental Status: He is alert.   Psychiatric:         Mood and Affect: Mood normal.         Lab on 03/15/2024   Component Date Value Ref Range Status    Glucose 03/15/2024 187 (H)  74 - 99 mg/dL Final    Sodium 03/15/2024 138  136 - 145 mmol/L Final    Potassium 03/15/2024 4.1  3.5 - 5.3 mmol/L Final    Chloride 03/15/2024 106  98 - 107 mmol/L Final    Bicarbonate 03/15/2024 25  21 - 32 mmol/L Final    Anion Gap 03/15/2024 11  10 - 20 mmol/L Final    Urea Nitrogen 03/15/2024 26 (H)  6 - 23 mg/dL Final    Creatinine 03/15/2024 1.34 (H)  0.50 - 1.30 mg/dL Final    eGFR 03/15/2024 55 (L)  >60 mL/min/1.73m*2 Final    Calculations of estimated GFR are performed using the 2021 CKD-EPI Study Refit equation without the race variable for the IDMS-Traceable creatinine methods.  https://jasn.asnjournals.org/content/early/2021/09/22/ASN.5468465593    Calcium 03/15/2024 9.2  8.6 - 10.3 mg/dL Final    Albumin 03/15/2024 4.0  3.4 - 5.0 g/dL Final    Alkaline Phosphatase 03/15/2024 63  33 - 136 U/L Final    Total Protein 03/15/2024 6.5  6.4 - 8.2 g/dL Final    AST 03/15/2024 19  9 - 39 U/L Final    Bilirubin, Total 03/15/2024 0.9  0.0 - 1.2 mg/dL Final    ALT 03/15/2024 18  10 - 52 U/L Final    Patients treated with Sulfasalazine may generate falsely decreased results for ALT.    Hemoglobin A1C 03/15/2024 6.2 (H)  see below % Final    Estimated Average Glucose 03/15/2024 131  Not Established mg/dL Final    Cholesterol 03/15/2024 110  0 - 199 mg/dL Final          Age      Desirable   Borderline High   High     0-19 Y     0 - 169       170 - 199     >/= 200    20-24 Y     0 - 189       190 - 224     >/= 225         >24 Y     0 - 199       200 - 239     >/= 240   **All ranges are  based on fasting samples. Specific   therapeutic targets will vary based on patient-specific   cardiac risk.    Pediatric guidelines reference:Pediatrics 2011, 128(S5).Adult guidelines reference: NCEP ATPIII Guidelines,GELA 2001, 258:2486-97    Venipuncture immediately after or during the administration of Metamizole may lead to falsely low results. Testing should be performed immediately prior to Metamizole dosing.    HDL-Cholesterol 03/15/2024 32.9  mg/dL Final      Age       Very Low   Low     Normal    High    0-19 Y    < 35      < 40     40-45     ----  20-24 Y    ----     < 40      >45      ----        >24 Y      ----     < 40     40-60      >60      Cholesterol/HDL Ratio 03/15/2024 3.3   Final      Ref Values  Desirable  < 3.4  High Risk  > 5.0    LDL Calculated 03/15/2024 57  <=99 mg/dL Final                                Near   Borderline      AGE      Desirable  Optimal    High     High     Very High     0-19 Y     0 - 109     ---    110-129   >/= 130     ----    20-24 Y     0 - 119     ---    120-159   >/= 160     ----      >24 Y     0 -  99   100-129  130-159   160-189     >/=190      VLDL 03/15/2024 20  0 - 40 mg/dL Final    Triglycerides 03/15/2024 101  0 - 149 mg/dL Final       Age         Desirable   Borderline High   High     Very High   0 D-90 D    19 - 174         ----         ----        ----  91 D- 9 Y     0 -  74        75 -  99     >/= 100      ----    10-19 Y     0 -  89        90 - 129     >/= 130      ----    20-24 Y     0 - 114       115 - 149     >/= 150      ----         >24 Y     0 - 149       150 - 199    200- 499    >/= 500    Venipuncture immediately after or during the administration of Metamizole may lead to falsely low results. Testing should be performed immediately prior to Metamizole dosing.    Non HDL Cholesterol 03/15/2024 77  0 - 149 mg/dL Final          Age       Desirable   Borderline High   High     Very High     0-19 Y     0 - 119       120 - 144     >/= 145    >/=  160    20-24 Y     0 - 149       150 - 189     >/= 190      ----         >24 Y    30 mg/dL above LDL Cholesterol goal      Prostate Specific AG 03/15/2024 1.62  <=4.00 ng/mL Final    WBC 03/15/2024 13.8 (H)  4.4 - 11.3 x10*3/uL Final    nRBC 03/15/2024 0.0  0.0 - 0.0 /100 WBCs Final    RBC 03/15/2024 4.34 (L)  4.50 - 5.90 x10*6/uL Final    Hemoglobin 03/15/2024 12.0 (L)  13.5 - 17.5 g/dL Final    Hematocrit 03/15/2024 36.1 (L)  41.0 - 52.0 % Final    MCV 03/15/2024 83  80 - 100 fL Final    MCH 03/15/2024 27.6  26.0 - 34.0 pg Final    MCHC 03/15/2024 33.2  32.0 - 36.0 g/dL Final    RDW 03/15/2024 14.9 (H)  11.5 - 14.5 % Final    Platelets 03/15/2024 124 (L)  150 - 450 x10*3/uL Final    Neutrophils % 03/15/2024 66.1  40.0 - 80.0 % Final    Immature Granulocytes %, Automated 03/15/2024 0.5  0.0 - 0.9 % Final    Immature Granulocyte Count (IG) includes promyelocytes, myelocytes and metamyelocytes but does not include bands. Percent differential counts (%) should be interpreted in the context of the absolute cell counts (cells/UL).    Lymphocytes % 03/15/2024 27.7  13.0 - 44.0 % Final    Monocytes % 03/15/2024 5.3  2.0 - 10.0 % Final    Eosinophils % 03/15/2024 0.1  0.0 - 6.0 % Final    Basophils % 03/15/2024 0.3  0.0 - 2.0 % Final    Neutrophils Absolute 03/15/2024 9.10 (H)  1.60 - 5.50 x10*3/uL Final    Percent differential counts (%) should be interpreted in the context of the absolute cell counts (cells/uL).    Immature Granulocytes Absolute, Au* 03/15/2024 0.07  0.00 - 0.50 x10*3/uL Final    Lymphocytes Absolute 03/15/2024 3.82 (H)  0.80 - 3.00 x10*3/uL Final    Monocytes Absolute 03/15/2024 0.73  0.05 - 0.80 x10*3/uL Final    Eosinophils Absolute 03/15/2024 0.02  0.00 - 0.40 x10*3/uL Final    Basophils Absolute 03/15/2024 0.04  0.00 - 0.10 x10*3/uL Final       Assessment/Plan   Problem List Items Addressed This Visit             ICD-10-CM    Diabetes mellitus (CMS/HCC) E11.9    Relevant Medications    insulin  "degludec (Tresiba FlexTouch U-200) 200 unit/mL (3 mL) injection    flash glucose sensor kit (FreeStyle Lamine 2 Sensor) kit    pen needle, diabetic, safety (Novofine Autocover) 30 gauge x 1/3\" needle    Other Relevant Orders    Comprehensive metabolic panel    Lipid panel    Hemoglobin A1c    Hyperlipidemia E78.5    Relevant Medications    rosuvastatin (Crestor) 40 mg tablet     Other Visit Diagnoses         Codes    Primary hypertension     I10    Relevant Medications    hydroCHLOROthiazide (HYDRODiuril) 25 mg tablet    bisoprolol (Zebeta) 5 mg tablet    losartan (Cozaar) 100 mg tablet    Dementia without behavioral disturbance, psychotic disturbance, mood disturbance, or anxiety, unspecified dementia severity, unspecified dementia type (CMS/[facility])     F03.90    Relevant Medications    donepezil (Aricept) 5 mg tablet    Diabetes mellitus treated with insulin (CMS/[facility])     E11.9, Z79.4    Relevant Medications    NovoLOG Flexpen U-100 Insulin 100 unit/mL (3 mL) pen          Refilled patient's medications.  Consider adding on inulin to patient's diet to try to support good gut health for his Alzheimer's.  Might consider Strattera in the future if desired.  Reducing dosage of Basaglar to see if that helps reduce hypoglycemic episodes.  Okay with switching to Tresiba as well if covered.  Follow-up in 3 months.  Advised the patient that he should not be driving anymore.  "

## 2024-04-19 ENCOUNTER — TELEPHONE (OUTPATIENT)
Dept: PRIMARY CARE | Facility: CLINIC | Age: 79
End: 2024-04-19
Payer: MEDICARE

## 2024-04-19 NOTE — TELEPHONE ENCOUNTER
[facility] called Ins will not cover this medicine or the generic NovoLOG Flexpen U-100 Insulin 100 unit/mL (3 mL) pen ----but will cover the medicine Fiasp.

## 2024-04-21 DIAGNOSIS — E11.9 DIABETES MELLITUS TREATED WITH INSULIN (MULTI): Primary | ICD-10-CM

## 2024-04-21 DIAGNOSIS — Z79.4 DIABETES MELLITUS TREATED WITH INSULIN (MULTI): Primary | ICD-10-CM

## 2024-04-21 RX ORDER — INSULIN ASPART INJECTION 100 [IU]/ML
35 INJECTION, SOLUTION SUBCUTANEOUS
Qty: 94.5 ML | Refills: 3 | Status: SHIPPED | OUTPATIENT
Start: 2024-04-21 | End: 2025-04-21

## 2024-04-29 ENCOUNTER — TELEPHONE (OUTPATIENT)
Dept: PRIMARY CARE | Facility: CLINIC | Age: 79
End: 2024-04-29
Payer: MEDICARE

## 2024-04-29 DIAGNOSIS — E11.9 TYPE 2 DIABETES MELLITUS WITHOUT COMPLICATION, WITH LONG-TERM CURRENT USE OF INSULIN (MULTI): ICD-10-CM

## 2024-04-29 DIAGNOSIS — Z79.4 TYPE 2 DIABETES MELLITUS WITHOUT COMPLICATION, WITH LONG-TERM CURRENT USE OF INSULIN (MULTI): ICD-10-CM

## 2024-04-29 RX ORDER — INSULIN DEGLUDEC 200 U/ML
25 INJECTION, SOLUTION SUBCUTANEOUS NIGHTLY
Qty: 11.7 ML | Refills: 3 | Status: SHIPPED | OUTPATIENT
Start: 2024-04-29 | End: 2024-04-29

## 2024-04-29 RX ORDER — INSULIN DEGLUDEC 200 U/ML
25 INJECTION, SOLUTION SUBCUTANEOUS NIGHTLY
Qty: 11.7 ML | Refills: 3 | Status: CANCELLED | OUTPATIENT
Start: 2024-04-29 | End: 2025-04-29

## 2024-04-29 RX ORDER — INSULIN DEGLUDEC 100 U/ML
26 INJECTION, SOLUTION SUBCUTANEOUS NIGHTLY
Qty: 23.4 ML | Refills: 3 | Status: SHIPPED | OUTPATIENT
Start: 2024-04-29 | End: 2025-04-29

## 2024-04-29 NOTE — TELEPHONE ENCOUNTER
OK, but I was asked to send in Tresiba.  Anyways, insurance wanted Fiasp as a short acting so I sent it in, but it looks like it was discontinued.  Can you check why it was not filled?

## 2024-04-29 NOTE — TELEPHONE ENCOUNTER
Nadya wife called and states they can not get   NovoLOG Flexpen U-100 Insulin 100 unit/mL (3 mL) pen [[id number]]  DISCONTINUED  so the Ins will cover either Novalog Mix 70/30 flex pen U-100 Subcutaneous insulin Pen 100 unit-ML (70-30) OR Novolin R Flex Pen Subcutaneous insulin 100 units ML (3ml) does one of this work for Michael? If not she has a book with some others in it she can check. Please Advise

## 2024-04-29 NOTE — TELEPHONE ENCOUNTER
Nadya called again, she spoke to [facility] again and the Fiasp does replace the novolg, so she was incorrect and so they were able to give her that , so the Novolog is now swithced to FIASP....      But the pended med needs to be 100units/ml... unless you believe he should be on 200/ml, if so, why? Please advise Thx

## 2024-05-01 ENCOUNTER — TELEPHONE (OUTPATIENT)
Dept: PRIMARY CARE | Facility: CLINIC | Age: 79
End: 2024-05-01
Payer: MEDICARE

## 2024-05-01 DIAGNOSIS — F03.90 DEMENTIA WITHOUT BEHAVIORAL DISTURBANCE, PSYCHOTIC DISTURBANCE, MOOD DISTURBANCE, OR ANXIETY, UNSPECIFIED DEMENTIA SEVERITY, UNSPECIFIED DEMENTIA TYPE (MULTI): ICD-10-CM

## 2024-05-01 NOTE — TELEPHONE ENCOUNTER
Nadya called -- the pharmacy said we rejected his medicine   donepezil (Aricept) 5 mg tablet ? But the pharmacy also told her that this has hydrochlorothiazide and he also takes   hydroCHLOROthiazide (HYDRODiuril) 25 mg and the new medicine for Insulin states to watch taking HCTZ

## 2024-05-03 RX ORDER — DONEPEZIL HYDROCHLORIDE 5 MG/1
5 TABLET, FILM COATED ORAL NIGHTLY
Qty: 90 TABLET | Refills: 3 | Status: SHIPPED | OUTPATIENT
Start: 2024-05-03 | End: 2025-05-03

## 2024-06-03 ENCOUNTER — HOSPITAL ENCOUNTER (OUTPATIENT)
Dept: CARDIOLOGY | Facility: HOSPITAL | Age: 79
Discharge: HOME | End: 2024-06-03
Payer: MEDICARE

## 2024-06-03 DIAGNOSIS — I35.0 SEVERE AORTIC VALVE STENOSIS: ICD-10-CM

## 2024-06-03 PROCEDURE — 93306 TTE W/DOPPLER COMPLETE: CPT | Performed by: INTERNAL MEDICINE

## 2024-06-03 PROCEDURE — 93306 TTE W/DOPPLER COMPLETE: CPT

## 2024-06-04 LAB
AORTIC VALVE MEAN GRADIENT: 54 MMHG
AORTIC VALVE PEAK VELOCITY: 4.78 M/S
AV PEAK GRADIENT: 91.4 MMHG
AVA (PEAK VEL): 0.65 CM2
AVA (VTI): 0.68 CM2
EJECTION FRACTION APICAL 4 CHAMBER: 61.3
LEFT ATRIUM VOLUME AREA LENGTH INDEX BSA: 34.4 ML/M2
LEFT VENTRICLE INTERNAL DIMENSION DIASTOLE: 4.39 CM (ref 3.5–6)
LEFT VENTRICULAR OUTFLOW TRACT DIAMETER: 1.97 CM
LV EJECTION FRACTION BIPLANE: 62 %
MITRAL VALVE E/A RATIO: 0.97
MITRAL VALVE E/E' RATIO: 21.93
RIGHT VENTRICLE FREE WALL PEAK S': 15.18 CM/S
TRICUSPID ANNULAR PLANE SYSTOLIC EXCURSION: 2.5 CM

## 2024-06-04 NOTE — RESULT ENCOUNTER NOTE
Findings unchanged from prior echocardiogram unless new heart failure symptoms or chest pain or syncope follow-up as usual.

## 2024-06-05 ENCOUNTER — TELEPHONE (OUTPATIENT)
Dept: CARDIOLOGY | Facility: CLINIC | Age: 79
End: 2024-06-05
Payer: MEDICARE

## 2024-06-05 NOTE — TELEPHONE ENCOUNTER
Called and had to leave a message - Dr. Manzo reviewed your echo results.  Things look stable.  No changes to treatment plan at this time.  Follow up as scheduled with Dr. Manzo on 7/02/2024 at 1:20 PM.  Call with any questions/problems.

## 2024-06-05 NOTE — TELEPHONE ENCOUNTER
----- Message from Charles Manzo MD sent at 6/4/2024  5:17 PM EDT -----  Findings unchanged from prior echocardiogram unless new heart failure symptoms or chest pain or syncope follow-up as usual.

## 2024-06-20 ENCOUNTER — LAB (OUTPATIENT)
Dept: LAB | Facility: LAB | Age: 79
End: 2024-06-20
Payer: MEDICARE

## 2024-06-20 DIAGNOSIS — Z79.4 TYPE 2 DIABETES MELLITUS WITHOUT COMPLICATION, WITH LONG-TERM CURRENT USE OF INSULIN (MULTI): ICD-10-CM

## 2024-06-20 DIAGNOSIS — E11.9 TYPE 2 DIABETES MELLITUS WITHOUT COMPLICATION, WITH LONG-TERM CURRENT USE OF INSULIN (MULTI): ICD-10-CM

## 2024-06-20 LAB
ALBUMIN SERPL BCP-MCNC: 3.9 G/DL (ref 3.4–5)
ALP SERPL-CCNC: 60 U/L (ref 33–136)
ALT SERPL W P-5'-P-CCNC: 20 U/L (ref 10–52)
ANION GAP SERPL CALC-SCNC: 12 MMOL/L (ref 10–20)
AST SERPL W P-5'-P-CCNC: 21 U/L (ref 9–39)
BILIRUB SERPL-MCNC: 1 MG/DL (ref 0–1.2)
BUN SERPL-MCNC: 29 MG/DL (ref 6–23)
CALCIUM SERPL-MCNC: 9.1 MG/DL (ref 8.6–10.3)
CHLORIDE SERPL-SCNC: 105 MMOL/L (ref 98–107)
CHOLEST SERPL-MCNC: 113 MG/DL (ref 0–199)
CHOLESTEROL/HDL RATIO: 3.4
CO2 SERPL-SCNC: 28 MMOL/L (ref 21–32)
CREAT SERPL-MCNC: 1.48 MG/DL (ref 0.5–1.3)
EGFRCR SERPLBLD CKD-EPI 2021: 48 ML/MIN/1.73M*2
EST. AVERAGE GLUCOSE BLD GHB EST-MCNC: 117 MG/DL
GLUCOSE SERPL-MCNC: 95 MG/DL (ref 74–99)
HBA1C MFR BLD: 5.7 %
HDLC SERPL-MCNC: 33 MG/DL
LDLC SERPL CALC-MCNC: 52 MG/DL
NON HDL CHOLESTEROL: 80 MG/DL (ref 0–149)
POTASSIUM SERPL-SCNC: 4.1 MMOL/L (ref 3.5–5.3)
PROT SERPL-MCNC: 5.9 G/DL (ref 6.4–8.2)
SODIUM SERPL-SCNC: 141 MMOL/L (ref 136–145)
TRIGL SERPL-MCNC: 138 MG/DL (ref 0–149)
VLDL: 28 MG/DL (ref 0–40)

## 2024-06-20 PROCEDURE — 80053 COMPREHEN METABOLIC PANEL: CPT

## 2024-06-20 PROCEDURE — 83036 HEMOGLOBIN GLYCOSYLATED A1C: CPT

## 2024-06-20 PROCEDURE — 36415 COLL VENOUS BLD VENIPUNCTURE: CPT

## 2024-06-20 PROCEDURE — 80061 LIPID PANEL: CPT

## 2024-06-24 ENCOUNTER — APPOINTMENT (OUTPATIENT)
Dept: PRIMARY CARE | Facility: CLINIC | Age: 79
End: 2024-06-24
Payer: MEDICARE

## 2024-06-24 VITALS
SYSTOLIC BLOOD PRESSURE: 120 MMHG | DIASTOLIC BLOOD PRESSURE: 58 MMHG | HEART RATE: 56 BPM | WEIGHT: 229 LBS | TEMPERATURE: 97 F | BODY MASS INDEX: 32.86 KG/M2 | OXYGEN SATURATION: 98 %

## 2024-06-24 DIAGNOSIS — I25.10 CORONARY ARTERIOSCLEROSIS: Primary | ICD-10-CM

## 2024-06-24 DIAGNOSIS — F03.90 DEMENTIA WITHOUT BEHAVIORAL DISTURBANCE, PSYCHOTIC DISTURBANCE, MOOD DISTURBANCE, OR ANXIETY, UNSPECIFIED DEMENTIA SEVERITY, UNSPECIFIED DEMENTIA TYPE (MULTI): ICD-10-CM

## 2024-06-24 DIAGNOSIS — N18.31 STAGE 3A CHRONIC KIDNEY DISEASE (MULTI): ICD-10-CM

## 2024-06-24 DIAGNOSIS — E11.9 TYPE 2 DIABETES MELLITUS WITHOUT COMPLICATION, WITH LONG-TERM CURRENT USE OF INSULIN (MULTI): ICD-10-CM

## 2024-06-24 DIAGNOSIS — Z79.4 TYPE 2 DIABETES MELLITUS WITHOUT COMPLICATION, WITH LONG-TERM CURRENT USE OF INSULIN (MULTI): ICD-10-CM

## 2024-06-24 PROCEDURE — 1159F MED LIST DOCD IN RCRD: CPT | Performed by: FAMILY MEDICINE

## 2024-06-24 PROCEDURE — 1036F TOBACCO NON-USER: CPT | Performed by: FAMILY MEDICINE

## 2024-06-24 PROCEDURE — 99214 OFFICE O/P EST MOD 30 MIN: CPT | Performed by: FAMILY MEDICINE

## 2024-06-24 PROCEDURE — 3074F SYST BP LT 130 MM HG: CPT | Performed by: FAMILY MEDICINE

## 2024-06-24 PROCEDURE — 3078F DIAST BP <80 MM HG: CPT | Performed by: FAMILY MEDICINE

## 2024-06-24 RX ORDER — PEN NEEDLE, DIABETIC 30 GX3/16"
NEEDLE, DISPOSABLE MISCELLANEOUS
Qty: 300 EACH | Refills: 3 | Status: SHIPPED | OUTPATIENT
Start: 2024-06-24 | End: 2024-06-24 | Stop reason: SDUPTHER

## 2024-06-24 RX ORDER — PEN NEEDLE, DIABETIC 30 GX3/16"
NEEDLE, DISPOSABLE MISCELLANEOUS
Qty: 300 EACH | Refills: 3 | Status: SHIPPED | OUTPATIENT
Start: 2024-06-24 | End: 2025-06-24

## 2024-06-24 NOTE — PROGRESS NOTES
Subjective   Patient ID: Michael Dougherty is a 78 y.o. male who presents for Hypertension and Diabetes (Recheck, review labs.).  HPI    HTN: well controlled    DM2: a1c was 5.7 this time which is an improvement.  He has been having difficulty with getting a low blood sugars on the Basaglar.  They have reduced his dose significantly and is still getting some borderline blood sugars especially at night.      CRI: Kidneys were sl worse on bloodwork    Dementia: has been having worsening cognition in recent years.  Forgets tasks, directions.  Needs more help doing finances.  Mother had Alzheimers.        Patient Active Problem List   Diagnosis    Adverse effect of calcium-channel blocker    Atherosclerosis of aorta (CMS-HCC)    Cataracts, bilateral    Chondrocalcinosis of right knee    Coronary arteriosclerosis    Diabetes mellitus (Multi)    Gait abnormality    Heart murmur    Essential hypertension    Hyperglycemia due to type 2 diabetes mellitus (Multi)    Hyperlipidemia    Lesion of ulnar nerve, left upper limb    Localized primary osteoarthritis of carpometacarpal (CMC) joint of left wrist    Low back pain    Morbid obesity (Multi)    Nonadherence with dietary restriction    Stage 3a chronic kidney disease (Multi)    Sensorineural hearing loss (SNHL) of both ears    Anemia       Social Connections: Not on file       Current Outpatient Medications on File Prior to Visit   Medication Sig Dispense Refill    aspirin 81 mg EC tablet Take 1 tablet (81 mg) by mouth once daily.      B complex-vitamin C-folic acid (Nephro-Anneliese) 0.8 mg tablet Take 1 tablet by mouth once daily.      bisoprolol (Zebeta) 5 mg tablet Take 1 tablet (5 mg) by mouth once daily. 90 tablet 3    brimonidine (AlphaGAN P) 0.2 % ophthalmic solution Administer 1 drop into both eyes 2 times a day.      donepezil (Aricept) 5 mg tablet Take 1 tablet (5 mg) by mouth once daily at bedtime. 90 tablet 3    fluticasone (Flonase) 50 mcg/actuation nasal spray  "Administer 2 sprays into each nostril once daily. Shake gently. Before first use, prime pump. After use, clean tip and replace cap. 16 g 5    FreeStyle Lamine reader (FreeStyle Lamine 2 [place]) misc Use as instructed 1 each 0    hydroCHLOROthiazide (HYDRODiuril) 25 mg tablet Take 1 tablet (25 mg) by mouth once daily. 90 tablet 3    insulin aspart, with niacinamide, (Fiasp FlexTouch U-100 Insulin) 100 unit/mL (3 mL) injection Inject 35 Units under the skin 3 times a day with meals. Take as directed per insulin instructions. 94.5 mL 3    losartan (Cozaar) 100 mg tablet Take 1 tablet (100 mg) by mouth once daily. 90 tablet 3    OneTouch Ultra Test strip USE 1 TEST STRIP 3 TIMES A  strip 3    rosuvastatin (Crestor) 40 mg tablet Take 1 tablet (40 mg) by mouth once daily at bedtime. 90 tablet 3    timolol (Timoptic) 0.5 % ophthalmic solution Administer 1 drop into both eyes 2 times a day.      [DISCONTINUED] insulin degludec (Tresiba FlexTouch U-100) 100 unit/mL (3 mL) injection Inject 26 Units under the skin once daily at bedtime. Take as directed per insulin instructions. 23.4 mL 3    [DISCONTINUED] pen needle, diabetic, safety (Novofine Autocover) 30 gauge x 1/3\" needle To be used  each 3    [DISCONTINUED] flash glucose sensor kit (FreeStyle Lamine 2 Sensor) kit Use as instructed, every 2 weeks (Patient not taking: Reported on 6/24/2024) 1 each 0     No current facility-administered medications on file prior to visit.        Vitals:    06/24/24 1323   BP: 120/58   Pulse: 56   Temp: 36.1 °C (97 °F)   SpO2: 98%     Vitals:    06/24/24 1323   Weight: 104 kg (229 lb)       Review of Systems   All other systems reviewed and are negative.      Objective     Physical Exam  Constitutional:       Appearance: Normal appearance. He is well-developed.   HENT:      Head: Atraumatic.   Cardiovascular:      Rate and Rhythm: Normal rate and regular rhythm.      Heart sounds: Normal heart sounds. No murmur heard.  Pulmonary: "      Effort: Pulmonary effort is normal.      Breath sounds: Normal breath sounds.   Abdominal:      General: Bowel sounds are normal.      Palpations: Abdomen is soft.   Skin:     General: Skin is warm.   Neurological:      General: No focal deficit present.      Mental Status: He is alert.   Psychiatric:         Mood and Affect: Mood normal.         Lab on 06/20/2024   Component Date Value Ref Range Status    Glucose 06/20/2024 95  74 - 99 mg/dL Final    Sodium 06/20/2024 141  136 - 145 mmol/L Final    Potassium 06/20/2024 4.1  3.5 - 5.3 mmol/L Final    Chloride 06/20/2024 105  98 - 107 mmol/L Final    Bicarbonate 06/20/2024 28  21 - 32 mmol/L Final    Anion Gap 06/20/2024 12  10 - 20 mmol/L Final    Urea Nitrogen 06/20/2024 29 (H)  6 - 23 mg/dL Final    Creatinine 06/20/2024 1.48 (H)  0.50 - 1.30 mg/dL Final    eGFR 06/20/2024 48 (L)  >60 mL/min/1.73m*2 Final    Calculations of estimated GFR are performed using the 2021 CKD-EPI Study Refit equation without the race variable for the IDMS-Traceable creatinine methods.  https://jasn.asnjournals.org/content/early/2021/09/22/ASN.6662443367    Calcium 06/20/2024 9.1  8.6 - 10.3 mg/dL Final    Albumin 06/20/2024 3.9  3.4 - 5.0 g/dL Final    Alkaline Phosphatase 06/20/2024 60  33 - 136 U/L Final    Total Protein 06/20/2024 5.9 (L)  6.4 - 8.2 g/dL Final    AST 06/20/2024 21  9 - 39 U/L Final    Bilirubin, Total 06/20/2024 1.0  0.0 - 1.2 mg/dL Final    ALT 06/20/2024 20  10 - 52 U/L Final    Patients treated with Sulfasalazine may generate falsely decreased results for ALT.    Cholesterol 06/20/2024 113  0 - 199 mg/dL Final          Age      Desirable   Borderline High   High     0-19 Y     0 - 169       170 - 199     >/= 200    20-24 Y     0 - 189       190 - 224     >/= 225         >24 Y     0 - 199       200 - 239     >/= 240   **All ranges are based on fasting samples. Specific   therapeutic targets will vary based on patient-specific   cardiac risk.    Pediatric  guidelines reference:Pediatrics 2011, 128(S5).Adult guidelines reference: NCEP ATPIII Guidelines,GELA 2001, 258:2486-11    Venipuncture immediately after or during the administration of Metamizole may lead to falsely low results. Testing should be performed immediately prior to Metamizole dosing.    HDL-Cholesterol 06/20/2024 33.0  mg/dL Final      Age       Very Low   Low     Normal    High    0-19 Y    < 35      < 40     40-45     ----  20-24 Y    ----     < 40      >45      ----        >24 Y      ----     < 40     40-60      >60      Cholesterol/HDL Ratio 06/20/2024 3.4   Final      Ref Values  Desirable  < 3.4  High Risk  > 5.0    LDL Calculated 06/20/2024 52  <=99 mg/dL Final                                Near   Borderline      AGE      Desirable  Optimal    High     High     Very High     0-19 Y     0 - 109     ---    110-129   >/= 130     ----    20-24 Y     0 - 119     ---    120-159   >/= 160     ----      >24 Y     0 -  99   100-129  130-159   160-189     >/=190      VLDL 06/20/2024 28  0 - 40 mg/dL Final    Triglycerides 06/20/2024 138  0 - 149 mg/dL Final       Age         Desirable   Borderline High   High     Very High   0 D-90 D    19 - 174         ----         ----        ----  91 D- 9 Y     0 -  74        75 -  99     >/= 100      ----    10-19 Y     0 -  89        90 - 129     >/= 130      ----    20-24 Y     0 - 114       115 - 149     >/= 150      ----         >24 Y     0 - 149       150 - 199    200- 499    >/= 500    Venipuncture immediately after or during the administration of Metamizole may lead to falsely low results. Testing should be performed immediately prior to Metamizole dosing.    Non HDL Cholesterol 06/20/2024 80  0 - 149 mg/dL Final          Age       Desirable   Borderline High   High     Very High     0-19 Y     0 - 119       120 - 144     >/= 145    >/= 160    20-24 Y     0 - 149       150 - 189     >/= 190      ----         >24 Y    30 mg/dL above LDL Cholesterol goal       "Hemoglobin A1C 06/20/2024 5.7 (H)  see below % Final    Estimated Average Glucose 06/20/2024 117  Not Established mg/dL Final   Hospital Outpatient Visit on 06/03/2024   Component Date Value Ref Range Status    LVOT diam 06/03/2024 1.97  cm Final    LV Biplane EF 06/03/2024 62  % Final    MV E/A ratio 06/03/2024 0.97   Final    MV avg E/e' ratio 06/03/2024 21.93   Final    Tricuspid annular plane systolic e* 06/03/2024 2.5  cm Final    AV mn grad 06/03/2024 54.0  mmHg Final    LA vol index A/L 06/03/2024 34.4  ml/m2 Final    AV pk anabel 06/03/2024 4.78  m/s Final    RV free wall pk S' 06/03/2024 15.18  cm/s Final    LVIDd 06/03/2024 4.39  cm Final    Aortic Valve Area by Continuity of* 06/03/2024 0.68  cm2 Final    Aortic Valve Area by Continuity of* 06/03/2024 0.65  cm2 Final    AV pk grad 06/03/2024 91.4  mmHg Final    LV A4C EF 06/03/2024 61.3   Final       Assessment/Plan   Problem List Items Addressed This Visit             ICD-10-CM    Coronary arteriosclerosis - Primary I25.10    Diabetes mellitus (Multi) E11.9    Relevant Medications    pen needle, diabetic 31 gauge x 5/16\" needle    semaglutide (Rybelsus) 3 mg tablet    semaglutide (Rybelsus) 7 mg tablet (Start on 7/24/2024)    Stage 3a chronic kidney disease (Multi) N18.31     Other Visit Diagnoses         Codes    Dementia without behavioral disturbance, psychotic disturbance, mood disturbance, or anxiety, unspecified dementia severity, unspecified dementia type (Multi)     F03.90    Relevant Medications    pen needle, diabetic 31 gauge x 5/16\" needle    semaglutide (Rybelsus) 3 mg tablet    semaglutide (Rybelsus) 7 mg tablet (Start on 7/24/2024)          Refilled patient's medications.  Finish Tresiba and switch to Rybelsus and titrate upwards.  Fu in 3 mo  "

## 2024-07-02 ENCOUNTER — APPOINTMENT (OUTPATIENT)
Dept: CARDIOLOGY | Facility: CLINIC | Age: 79
End: 2024-07-02
Payer: MEDICARE

## 2024-07-02 VITALS
HEIGHT: 70 IN | DIASTOLIC BLOOD PRESSURE: 58 MMHG | HEART RATE: 51 BPM | BODY MASS INDEX: 34.22 KG/M2 | SYSTOLIC BLOOD PRESSURE: 122 MMHG | WEIGHT: 239 LBS

## 2024-07-02 DIAGNOSIS — R94.39 CARDIOVASCULAR STRESS TEST ABNORMAL: ICD-10-CM

## 2024-07-02 DIAGNOSIS — I10 ESSENTIAL HYPERTENSION: ICD-10-CM

## 2024-07-02 DIAGNOSIS — I25.10 CORONARY ARTERIOSCLEROSIS: Primary | ICD-10-CM

## 2024-07-02 DIAGNOSIS — I35.0 SEVERE AORTIC VALVE STENOSIS: ICD-10-CM

## 2024-07-02 PROCEDURE — 1036F TOBACCO NON-USER: CPT | Performed by: INTERNAL MEDICINE

## 2024-07-02 PROCEDURE — 1160F RVW MEDS BY RX/DR IN RCRD: CPT | Performed by: INTERNAL MEDICINE

## 2024-07-02 PROCEDURE — 1159F MED LIST DOCD IN RCRD: CPT | Performed by: INTERNAL MEDICINE

## 2024-07-02 PROCEDURE — 3078F DIAST BP <80 MM HG: CPT | Performed by: INTERNAL MEDICINE

## 2024-07-02 PROCEDURE — 3074F SYST BP LT 130 MM HG: CPT | Performed by: INTERNAL MEDICINE

## 2024-07-02 PROCEDURE — 93000 ELECTROCARDIOGRAM COMPLETE: CPT | Performed by: INTERNAL MEDICINE

## 2024-07-02 PROCEDURE — 99214 OFFICE O/P EST MOD 30 MIN: CPT | Performed by: INTERNAL MEDICINE

## 2024-07-02 ASSESSMENT — ENCOUNTER SYMPTOMS
OCCASIONAL FEELINGS OF UNSTEADINESS: 0
LOSS OF SENSATION IN FEET: 0
DEPRESSION: 0

## 2024-07-02 NOTE — PROGRESS NOTES
"Chief Complaint:   Annual Exam (yearly)     History Of Present Illness:    Michael Dougherty is a 78 y.o. male presenting for annual follow-up.  He has a history of diabetes, dyslipidemia, hypertension, early dementia, severe aortic valve stenosis.  Recently had a CT calcium score ordered by PCP which was abnormal and led to a stress test showing inferior scar with mild LV dysfunction.  Repeat echo from 2024 shows normal LV function severe aortic valve stenosis.  He continues to be asymptomatic.  Came here today accompanied by his wife.     He is asymptomatic however denies any chest pain, shortness of breath, ankle swelling, lightheadedness or loss of consciousness.     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   .     Last Recorded Vitals:  Vitals:    07/02/24 1309   BP: 122/58   Pulse: 51   Weight: 108 kg (239 lb)   Height: 1.778 m (5' 10\")       Past Medical History:  He has no past medical history on file.    Past Surgical History:  He has a past surgical history that includes Hand surgery (12/04/2014); Other surgical history (06/10/2021); Other surgical history (06/10/2021); Other surgical history (06/10/2021); Other surgical history (06/10/2021); Other surgical history (06/10/2021); and Other surgical history (06/10/2021).      Social History:  He reports that he has quit smoking. His smoking use included cigarettes. He has never used smokeless tobacco. He reports that he does not currently use alcohol. He reports that he does not use drugs.    Family History:  Family History   Problem Relation Name Age of Onset    Diabetes Mother      Hypertension Father      Diabetes Father      Coronary artery disease Sister      Diabetes Brother      Melanoma Brother          Allergies:  Ezetimibe, Fenofibrate, Niacin, and Doxycycline    Outpatient Medications:  Current Outpatient Medications   Medication Instructions    aspirin 81 mg, oral, Daily    B " "complex-vitamin C-folic acid (Nephro-Anneliese) 0.8 mg tablet 0.8 mg, oral, Daily    bisoprolol (ZEBETA) 5 mg, oral, Daily    brimonidine (AlphaGAN P) 0.2 % ophthalmic solution 1 drop, Both Eyes, 2 times daily    donepezil (ARICEPT) 5 mg, oral, Nightly    fluticasone (Flonase) 50 mcg/actuation nasal spray 2 sprays, Each Nostril, Daily, Shake gently. Before first use, prime pump. After use, clean tip and replace cap.    FreeStyle Lamine reader (FreeStyle Lamine 2 [place]) misc Use as instructed    hydroCHLOROthiazide (HYDRODIURIL) 25 mg, oral, Daily    insulin aspart, with niacinamide, (Fiasp FlexTouch U-100 Insulin) 100 unit/mL (3 mL) injection 35 Units, subcutaneous, 3 times daily (morning, midday, late afternoon), Take as directed per insulin instructions.    losartan (COZAAR) 100 mg, oral, Daily    OneTouch Ultra Test strip USE 1 TEST STRIP 3 TIMES A DAY    pen needle, diabetic 31 gauge x 5/16\" needle Use to inject 1-4 times daily as directed.    rosuvastatin (CRESTOR) 40 mg, oral, Nightly    [START ON 7/24/2024] semaglutide (RYBELSUS) 7 mg, oral, Daily    semaglutide (RYBELSUS) 3 mg, oral, Daily    timolol (Timoptic) 0.5 % ophthalmic solution 1 drop, Both Eyes, 2 times daily       Physical Exam:  Physical Exam  Vitals reviewed.   Constitutional:       Appearance: Normal appearance.   Neck:      Vascular: No carotid bruit or JVD.   Cardiovascular:      Rate and Rhythm: Normal rate and regular rhythm.      Heart sounds: S1 normal and S2 normal. Murmur heard.      Systolic murmur is present with a grade of 2/6.   Pulmonary:      Effort: Pulmonary effort is normal.      Breath sounds: Normal breath sounds.   Abdominal:      General: Abdomen is flat. Bowel sounds are normal.      Palpations: Abdomen is soft.   Musculoskeletal:      Right lower leg: No edema.      Left lower leg: No edema.   Skin:     General: Skin is warm.   Neurological:      Mental Status: He is alert. Mental status is at baseline.   Psychiatric:         " "Mood and Affect: Mood normal.         Behavior: Behavior normal.           Last Labs:  CBC -  Lab Results   Component Value Date    WBC 13.8 (H) 03/15/2024    HGB 12.0 (L) 03/15/2024    HCT 36.1 (L) 03/15/2024    MCV 83 03/15/2024     (L) 03/15/2024       CMP -  Lab Results   Component Value Date    CALCIUM 9.1 06/20/2024    PROT 5.9 (L) 06/20/2024    ALBUMIN 3.9 06/20/2024    AST 21 06/20/2024    ALT 20 06/20/2024    ALKPHOS 60 06/20/2024    BILITOT 1.0 06/20/2024       LIPID PANEL -   Lab Results   Component Value Date    CHOL 113 06/20/2024    TRIG 138 06/20/2024    HDL 33.0 06/20/2024    CHHDL 3.4 06/20/2024    LDLF 45 05/27/2023    VLDL 28 06/20/2024    NHDL 80 06/20/2024       RENAL FUNCTION PANEL -   Lab Results   Component Value Date    GLUCOSE 95 06/20/2024     06/20/2024    K 4.1 06/20/2024     06/20/2024    CO2 28 06/20/2024    ANIONGAP 12 06/20/2024    BUN 29 (H) 06/20/2024    CREATININE 1.48 (H) 06/20/2024    GFRMALE 48 (A) 08/16/2023    CALCIUM 9.1 06/20/2024    ALBUMIN 3.9 06/20/2024        Lab Results   Component Value Date    HGBA1C 5.7 (H) 06/20/2024       Last Cardiology Tests:  ECG:  No results found for this or any previous visit from the past 1095 days.      Echo:  Transthoracic Echo (TTE) Complete 06/03/2024      Ejection Fractions:  No results found for: \"EF\"    Cath:  No results found for this or any previous visit from the past 1095 days.      Stress Test:  Nuclear Stress Test 02/23/2024      Cardiac Imaging:  CT cardiac scoring wo IV contrast 01/09/2024          Assessment/Plan     In summary  is a 78-year-old gentleman with asymptomatic severe aortic valve stenosis.     I discussed the natural history of severe aortic valve stenosis. I discussed possible treatment options.        I discussed that potentially he will develop symptoms in the next 6 months to a year. I discussed the nature of those symptoms. We will seek treatment with aortic valve replacement if " and when he becomes symptomatic. We will also monitor his LV function periodically with an echocardiogram. If his LV function declines he may require treatment, even if asymptomatic.     He will follow-up closely in office 6 months with our FRANCO in a year with me with a repeat echo. He will notify us if he develops symptoms in the meantime, and will arrange earlier left and right heart catheterization and referral to structural heart team.    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  EKG also is consistent with that.  At this time due to lack of symptoms there is nothing to do other than aggressive risk factor modification.  His blood pressure, lipid levels all are ideal.    Continue current management.          Charles Manzo MD

## 2024-07-17 ENCOUNTER — OFFICE VISIT (OUTPATIENT)
Dept: HEMATOLOGY/ONCOLOGY | Facility: CLINIC | Age: 79
End: 2024-07-17
Payer: MEDICARE

## 2024-07-17 VITALS
SYSTOLIC BLOOD PRESSURE: 107 MMHG | DIASTOLIC BLOOD PRESSURE: 58 MMHG | WEIGHT: 226.63 LBS | RESPIRATION RATE: 16 BRPM | OXYGEN SATURATION: 98 % | BODY MASS INDEX: 32.45 KG/M2 | HEIGHT: 70 IN | TEMPERATURE: 96.6 F | HEART RATE: 50 BPM

## 2024-07-17 DIAGNOSIS — D72.829 LEUKOCYTOSIS, UNSPECIFIED TYPE: ICD-10-CM

## 2024-07-17 DIAGNOSIS — D72.820 MONOCLONAL B-CELL LYMPHOCYTOSIS: ICD-10-CM

## 2024-07-17 DIAGNOSIS — C91.10 CLL (CHRONIC LYMPHOCYTIC LEUKEMIA) (MULTI): ICD-10-CM

## 2024-07-17 DIAGNOSIS — D72.820 LYMPHOCYTOSIS: ICD-10-CM

## 2024-07-17 LAB
BASOPHILS # BLD AUTO: 0.06 X10*3/UL (ref 0–0.1)
BASOPHILS NFR BLD AUTO: 0.5 %
EOSINOPHIL # BLD AUTO: 0.38 X10*3/UL (ref 0–0.4)
EOSINOPHIL NFR BLD AUTO: 3.3 %
ERYTHROCYTE [DISTWIDTH] IN BLOOD BY AUTOMATED COUNT: 14.5 % (ref 11.5–14.5)
HCT VFR BLD AUTO: 33.9 % (ref 41–52)
HGB BLD-MCNC: 11.4 G/DL (ref 13.5–17.5)
IMM GRANULOCYTES # BLD AUTO: 0.01 X10*3/UL (ref 0–0.5)
IMM GRANULOCYTES NFR BLD AUTO: 0.1 % (ref 0–0.9)
LYMPHOCYTES # BLD AUTO: 5.62 X10*3/UL (ref 0.8–3)
LYMPHOCYTES NFR BLD AUTO: 49.3 %
MCH RBC QN AUTO: 28.6 PG (ref 26–34)
MCHC RBC AUTO-ENTMCNC: 33.6 G/DL (ref 32–36)
MCV RBC AUTO: 85 FL (ref 80–100)
MONOCYTES # BLD AUTO: 0.69 X10*3/UL (ref 0.05–0.8)
MONOCYTES NFR BLD AUTO: 6.1 %
NEUTROPHILS # BLD AUTO: 4.63 X10*3/UL (ref 1.6–5.5)
NEUTROPHILS NFR BLD AUTO: 40.7 %
NRBC BLD-RTO: ABNORMAL /100{WBCS}
PLATELET # BLD AUTO: 98 X10*3/UL (ref 150–450)
RBC # BLD AUTO: 3.99 X10*6/UL (ref 4.5–5.9)
RBC MORPH BLD: NORMAL
WBC # BLD AUTO: 11.4 X10*3/UL (ref 4.4–11.3)

## 2024-07-17 PROCEDURE — 1159F MED LIST DOCD IN RCRD: CPT | Performed by: INTERNAL MEDICINE

## 2024-07-17 PROCEDURE — 99214 OFFICE O/P EST MOD 30 MIN: CPT | Performed by: INTERNAL MEDICINE

## 2024-07-17 PROCEDURE — 1126F AMNT PAIN NOTED NONE PRSNT: CPT | Performed by: INTERNAL MEDICINE

## 2024-07-17 PROCEDURE — 3074F SYST BP LT 130 MM HG: CPT | Performed by: INTERNAL MEDICINE

## 2024-07-17 PROCEDURE — 1160F RVW MEDS BY RX/DR IN RCRD: CPT | Performed by: INTERNAL MEDICINE

## 2024-07-17 PROCEDURE — 36415 COLL VENOUS BLD VENIPUNCTURE: CPT | Performed by: INTERNAL MEDICINE

## 2024-07-17 PROCEDURE — 3078F DIAST BP <80 MM HG: CPT | Performed by: INTERNAL MEDICINE

## 2024-07-17 ASSESSMENT — NCCN CANCER DISTRESS MANAGEMENT
NCCN PRACTICAL CONCERNS: 1
NCCN PRACTICAL CONCERNS: 12
NCCN PHYSICAL CONCERNS: 6

## 2024-07-17 ASSESSMENT — PAIN SCALES - GENERAL: PAINLEVEL: 0-NO PAIN

## 2024-07-17 NOTE — PROGRESS NOTES
Michael Dougherty is a 78 y.o. male evaluated for leukocytosis.  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  Normocytic anemia    3.  Chronic thrombocytopenia    4.  IgM monoclonal gammopathy    Interval history  7/17/24  Patient was evaluated for lymphocytosis.  Flow cytometry did show CD5 positive lymphoproliferative disorder.     Serum protei follow-up visit no B symptoms overall doing very well.   Today WBC count 11.3, hemoglobin 11.4, platelets 98,000 and ALC 5.6    Patient is asymptomatic            Subjective   Patient is a 71-year-old gentleman with a history of coronary artery disease, diabetes mellitus, hypertension, hyperlipidemia, osteoarthritis, obesity and CBC done on #14 2023 did show WBC: 13.3, hemoglobin 10.6, platelets 134.  68% neutrophils and 26% lymphocyte.  Absolute lymphocyte count was 3.45    Medical record reviewed  8/16/23 , WBC count 13.5, hemoglobin 11.9, platelets 127, 49% neutrophil, 43% lymphocyte, ALC 5.82    May 27, 2023, WBC count 11.3, hemoglobin 11.3, platelets 108, 48% lymphocyte, ALC 5.41    January 28, 2023, WBC count 15.8, hemoglobin 12.5, platelets 137, neutrophils 32%, lymphocyte 57%, ALC 9.15.    Patient also has stage III chronic renal insufficiency creatinine between 1.35-1.5.              Objective   Patient denies any fever, no headache, no sore throat, no night sweats.  Patient still very active.  Good appetite no chest pain and shortness of breath, patient is retired still working in the home.  History of weight loss.    ROS  No fever, no night sweats, questionable history of weight loss patient is active, no chest pain and shortness of breath, no sinus problem, no sore throat, no nausea vomiting, no abdominal pain, no diarrhea and constipation, chronic knee pain due to osteoarthritis    No past medical history on file.   Past Surgical History:   Procedure  Laterality Date    HAND SURGERY  12/04/2014    Hand Surgery                                                                                                                                                          OTHER SURGICAL HISTORY  06/10/2021    Carpal tunnel surgery    OTHER SURGICAL HISTORY  06/10/2021    Trigger finger repair    OTHER SURGICAL HISTORY  06/10/2021    Wrist surgery    OTHER SURGICAL HISTORY  06/10/2021    Cataract surgery    OTHER SURGICAL HISTORY  06/10/2021    Foot surgery    OTHER SURGICAL HISTORY  06/10/2021    Knee replacement        Family History   Problem Relation Name Age of Onset    Diabetes Mother      Hypertension Father      Diabetes Father      Coronary artery disease Sister      Diabetes Brother      Melanoma Brother        Social History     Tobacco Use   Smoking Status Former    Current packs/day: 1.00    Types: Cigarettes   Smokeless Tobacco Never        Current Outpatient Medications:     aspirin 81 mg EC tablet, Take 1 tablet (81 mg) by mouth once daily., Disp: , Rfl:     B complex-vitamin C-folic acid (Nephro-Anneliese) 0.8 mg tablet, Take 1 tablet by mouth once daily., Disp: , Rfl:     bisoprolol (Zebeta) 5 mg tablet, Take 1 tablet (5 mg) by mouth once daily., Disp: 90 tablet, Rfl: 3    brimonidine (AlphaGAN P) 0.2 % ophthalmic solution, Administer 1 drop into both eyes 2 times a day., Disp: , Rfl:     donepezil (Aricept) 5 mg tablet, Take 1 tablet (5 mg) by mouth once daily at bedtime., Disp: 30 tablet, Rfl: 11    fluticasone (Flonase) 50 mcg/actuation nasal spray, Administer 2 sprays into each nostril once daily. Shake gently. Before first use, prime pump. After use, clean tip and replace cap., Disp: 16 g, Rfl: 5    FreeStyle Lamine reader (FreeStyle Lamine 2 Erving) misc, Use as instructed, Disp: 1 each, Rfl: 0    FreeStyle Lamine sensor system (FreeStyle Lamine 2 Sensor) kit, Use as instructed, every 2 weeks, Disp: 1 each, Rfl: 0    hydroCHLOROthiazide (HYDRODiuril) 25 mg  "tablet, Take 1 tablet (25 mg) by mouth once daily., Disp: 90 tablet, Rfl: 3    insulin glargine (Basaglar KwikPen U-100 Insulin) 100 unit/mL (3 mL) pen, Inject 35 Units under the skin once daily at bedtime. Take as directed per insulin instructions., Disp: 31.5 mL, Rfl: 3    losartan (Cozaar) 100 mg tablet, Take 1 tablet (100 mg) by mouth once daily., Disp: 90 tablet, Rfl: 3    NovoLOG FlexPen U-100 Insulin 100 unit/mL (3 mL) pen, Inject 35 Units under the skin 3 times a day with meals. Take as directed per insulin instructions., Disp: 94.5 mL, Rfl: 3    OneTouch Ultra Test strip, USE 1 TEST STRIP 3 TIMES A DAY, Disp: 300 strip, Rfl: 3    pen needle, diabetic 32 gauge x 5/32\" needle, To be used TID, Disp: 300 each, Rfl: 3    rosuvastatin (Crestor) 40 mg tablet, Take 1 tablet (40 mg) by mouth once daily at bedtime., Disp: 90 tablet, Rfl: 3    timolol (Timoptic) 0.5 % ophthalmic solution, Administer 1 drop into both eyes 2 times a day., Disp: , Rfl:       Last Recorded Vitals  There were no vitals taken for this visit.    Physical Exam  Vitals reviewed.   Constitutional:       Appearance: Normal appearance.   HENT:      Head: Normocephalic and atraumatic.      Nose: Nose normal.   Eyes:      Extraocular Movements: Extraocular movements intact.      Pupils: Pupils are equal, round, and reactive to light.   Neck:      Comments: No carotid bruit, no cervical lymphadenopathy  Cardiovascular:      Rate and Rhythm: Normal rate and regular rhythm.   Pulmonary:      Effort: Pulmonary effort is normal.      Breath sounds: Normal breath sounds.   Abdominal:      General: Abdomen is flat. Bowel sounds are normal.      Palpations: Abdomen is soft.      Comments: No hepatosplenomegaly, normotonic bowel sounds   Musculoskeletal:         General: Normal range of motion.      Cervical back: Normal range of motion and neck supple.   Lymphadenopathy:      Comments: No peripheral lymphadenopathy   Skin:     General: Skin is warm and " dry.   Neurological:      General: No focal deficit present.      Mental Status: He is alert and oriented to person, place, and time.           Relevant Results          Immunophenotypic findings consistent with CD5 positive clonal B cell population, see note.      No abnormal T cell population identified.      Note: The cells are CD5 positive and mostly have a characteristic chronic lymphocytic leukemia/small lymphocytic lymphoma phenotype but with some atypia (such as lack of CD43 expression). The frequency of the clonal population (less than 5000 cells per uL is also insufficient to diagnose CLL.  Correlation with clinical, morphologic and genetic findings will be helpful in establishing a diagnosis.      Electronically signed by Espinoza Donaldson MD PhD on 12/20/2023 at 0922        By the signature on this report, the individual or group listed as making the Final Interpretation/Diagnosis certifies that they have reviewed this case and the staining reactivity of the antibodies and reagents in the analysis were determined to be acceptable. Diagnostic interpretation performed at [facility]   Cell Populations    Abnormal Cell Population: Lymphocytes     Percentage:  27 %         Phenotype   Marker Interpretation      CD1c Partial   CD5 Positive Dim-moderate   CD10 Negative   CD11c Dim-Negative   CD13 Negative   CD19 Positive moderate   CD20 Positive moderate   CD23 Positive dim-moderate   CD25 Negative   CD38 Positive   CD43 Negative   CD45 Positive moderate   CD56 Negative   CD79b Positive dim    Dim    Positive moderate   HLA-DR Positive moderate      IgD Positive dim   Kappa Negative   Lambda Negative                Flow Differential    Lymphocyte: 39 %                  CD3+CD4+: 10 % ;                                         CD3+CD8+: 13 % ;                                         Natural Killer Cells: 2 %                               CD19+: 75 %                  Monocyte: 5 %        Granulocyte: 52 %           Flow Test Ordered  not established Low Grade Panel   Specimen Viability  not established Acceptable   Cell Count  not established x10*3/uL 9.40   Number of Cells Collected  not established per tube 100,000.00   Methodology             Assessment/Plan   #1  CD5 positive lymphoproliferative disorder, 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  Absolute lymphocyte count was 9.15 in January 2023 and in August 2023, ALC was 5.82.  Recent CBC done in November 2023, WBC count was 13.3 and ALC 3.45.  Patient does have relative lymphocytosis which is fluctuating, needs to rule out monoclonal lymphocytosis versus chronic lymphocytic leukemia.    2.  Normocytic anemia initial workup was negative could be due to underlying renal insufficiency continue to monitor CBC    3.  Chronic thrombocytopenia could be ITP.    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  Patient has good prognosis, patient does not need any treatment.  Possible complication associated with the CLL including frequent infection, history of hemolytic anemia, ITP, reactive transformation discussed with patient briefly.  I will continue to monitor clinically.  CBC will be repeated every 6 months.    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  Chronic thrombocytopenia possible chronic ITP    3.  IgM monoclonal gammopathy    Clinically stable CBC stable, discussed with patient  Patient advised if you are feeling weak, fatigue noticed to have fever or lymphadenopathy, call our office, reevaluation after 6-month  Stormy Johnson MD

## 2024-07-18 ENCOUNTER — APPOINTMENT (OUTPATIENT)
Dept: ORTHOPEDIC SURGERY | Facility: CLINIC | Age: 79
End: 2024-07-18
Payer: MEDICARE

## 2024-07-18 ENCOUNTER — TELEPHONE (OUTPATIENT)
Dept: PRIMARY CARE | Facility: CLINIC | Age: 79
End: 2024-07-18

## 2024-07-18 DIAGNOSIS — M19.032 LOCALIZED PRIMARY OSTEOARTHRITIS OF CARPOMETACARPAL (CMC) JOINT OF LEFT WRIST: Primary | ICD-10-CM

## 2024-07-18 DIAGNOSIS — Z79.4 TYPE 2 DIABETES MELLITUS WITHOUT COMPLICATION, WITH LONG-TERM CURRENT USE OF INSULIN (MULTI): Primary | ICD-10-CM

## 2024-07-18 DIAGNOSIS — E11.9 TYPE 2 DIABETES MELLITUS WITHOUT COMPLICATION, WITH LONG-TERM CURRENT USE OF INSULIN (MULTI): Primary | ICD-10-CM

## 2024-07-18 RX ORDER — LIDOCAINE HYDROCHLORIDE 10 MG/ML
1 INJECTION INFILTRATION; PERINEURAL
Status: COMPLETED | OUTPATIENT
Start: 2024-07-18 | End: 2024-07-18

## 2024-07-18 RX ORDER — INSULIN DEGLUDEC 100 U/ML
65 INJECTION, SOLUTION SUBCUTANEOUS NIGHTLY
Qty: 58.5 ML | Refills: 3 | Status: SHIPPED | OUTPATIENT
Start: 2024-07-18 | End: 2025-07-18

## 2024-07-18 RX ORDER — TRIAMCINOLONE ACETONIDE 40 MG/ML
40 INJECTION, SUSPENSION INTRA-ARTICULAR; INTRAMUSCULAR
Status: COMPLETED | OUTPATIENT
Start: 2024-07-18 | End: 2024-07-18

## 2024-07-18 NOTE — PROGRESS NOTES
Subjective   Patient ID: Michael Dougherty is a 78 y.o. male who presents for No chief complaint on file..  HPI    Patient Active Problem List   Diagnosis    Adverse effect of calcium-channel blocker    Atherosclerosis of aorta (CMS-HCC)    Cataracts, bilateral    Chondrocalcinosis of right knee    Coronary arteriosclerosis    Diabetes mellitus (Multi)    Gait abnormality    Heart murmur    Essential hypertension    Hyperglycemia due to type 2 diabetes mellitus (Multi)    Hyperlipidemia    Lesion of ulnar nerve, left upper limb    Localized primary osteoarthritis of carpometacarpal (CMC) joint of left wrist    Low back pain    Morbid obesity (Multi)    Nonadherence with dietary restriction    Stage 3a chronic kidney disease (Multi)    Sensorineural hearing loss (SNHL) of both ears    Anemia    Severe aortic valve stenosis    Cardiovascular stress test abnormal       Social Connections: Not on file       Current Outpatient Medications on File Prior to Visit   Medication Sig Dispense Refill    aspirin 81 mg EC tablet Take 1 tablet (81 mg) by mouth once daily.      B complex-vitamin C-folic acid (Nephro-Anneliese) 0.8 mg tablet Take 1 tablet by mouth once daily.      bisoprolol (Zebeta) 5 mg tablet Take 1 tablet (5 mg) by mouth once daily. 90 tablet 3    brimonidine (AlphaGAN P) 0.2 % ophthalmic solution Administer 1 drop into both eyes 2 times a day.      donepezil (Aricept) 5 mg tablet Take 1 tablet (5 mg) by mouth once daily at bedtime. 90 tablet 3    hydroCHLOROthiazide (HYDRODiuril) 25 mg tablet Take 1 tablet (25 mg) by mouth once daily. 90 tablet 3    insulin aspart, with niacinamide, (Fiasp FlexTouch U-100 Insulin) 100 unit/mL (3 mL) injection Inject 35 Units under the skin 3 times a day with meals. Take as directed per insulin instructions. 94.5 mL 3    losartan (Cozaar) 100 mg tablet Take 1 tablet (100 mg) by mouth once daily. 90 tablet 3    OneTouch Ultra Test strip USE 1 TEST STRIP 3 TIMES A  strip 3    pen  "needle, diabetic 31 gauge x 5/16\" needle Use to inject 1-4 times daily as directed. 300 each 3    rosuvastatin (Crestor) 40 mg tablet Take 1 tablet (40 mg) by mouth once daily at bedtime. 90 tablet 3    semaglutide (Rybelsus) 3 mg tablet Take 1 tablet (3 mg) by mouth once daily. 30 tablet 0    timolol (Timoptic) 0.5 % ophthalmic solution Administer 1 drop into both eyes 2 times a day.       Current Facility-Administered Medications on File Prior to Visit   Medication Dose Route Frequency Provider Last Rate Last Admin    [COMPLETED] lidocaine (Xylocaine) 10 mg/mL (1 %) injection 1 mL  1 mL injection Once PRN Moris NAVAS Iglesia, DO   1 mL at 07/18/24 1054    [COMPLETED] triamcinolone acetonide (Kenalog-40) injection 40 mg  40 mg intra-articular Once PRN Moris NAVAS Iglesia, DO   40 mg at 07/18/24 1054        There were no vitals filed for this visit.  There were no vitals filed for this visit.    Review of Systems    Objective     Physical Exam    Office Visit on 07/17/2024   Component Date Value Ref Range Status    WBC 07/17/2024 11.4 (H)  4.4 - 11.3 x10*3/uL Final    nRBC 07/17/2024    Final    Not Measured    RBC 07/17/2024 3.99 (L)  4.50 - 5.90 x10*6/uL Final    Hemoglobin 07/17/2024 11.4 (L)  13.5 - 17.5 g/dL Final    Hematocrit 07/17/2024 33.9 (L)  41.0 - 52.0 % Final    MCV 07/17/2024 85  80 - 100 fL Final    MCH 07/17/2024 28.6  26.0 - 34.0 pg Final    MCHC 07/17/2024 33.6  32.0 - 36.0 g/dL Final    RDW 07/17/2024 14.5  11.5 - 14.5 % Final    Platelets 07/17/2024 98 (L)  150 - 450 x10*3/uL Final    Platelet count verified by smear review.    Neutrophils % 07/17/2024 40.7  40.0 - 80.0 % Final    Immature Granulocytes %, Automated 07/17/2024 0.1  0.0 - 0.9 % Final    Immature Granulocyte Count (IG) includes promyelocytes, myelocytes and metamyelocytes but does not include bands. Percent differential counts (%) should be interpreted in the context of the absolute cell counts (cells/UL).    Lymphocytes % 07/17/2024 49.3  " 13.0 - 44.0 % Final    Monocytes % 07/17/2024 6.1  2.0 - 10.0 % Final    Eosinophils % 07/17/2024 3.3  0.0 - 6.0 % Final    Basophils % 07/17/2024 0.5  0.0 - 2.0 % Final    Neutrophils Absolute 07/17/2024 4.63  1.60 - 5.50 x10*3/uL Final    Percent differential counts (%) should be interpreted in the context of the absolute cell counts (cells/uL).    Immature Granulocytes Absolute, Au* 07/17/2024 0.01  0.00 - 0.50 x10*3/uL Final    Lymphocytes Absolute 07/17/2024 5.62 (H)  0.80 - 3.00 x10*3/uL Final    Monocytes Absolute 07/17/2024 0.69  0.05 - 0.80 x10*3/uL Final    Eosinophils Absolute 07/17/2024 0.38  0.00 - 0.40 x10*3/uL Final    Basophils Absolute 07/17/2024 0.06  0.00 - 0.10 x10*3/uL Final    Automated WBC differential has been confirmed by manual smear.    RBC Morphology 07/17/2024 No significant RBC morphology present   Final   Lab on 06/20/2024   Component Date Value Ref Range Status    Glucose 06/20/2024 95  74 - 99 mg/dL Final    Sodium 06/20/2024 141  136 - 145 mmol/L Final    Potassium 06/20/2024 4.1  3.5 - 5.3 mmol/L Final    Chloride 06/20/2024 105  98 - 107 mmol/L Final    Bicarbonate 06/20/2024 28  21 - 32 mmol/L Final    Anion Gap 06/20/2024 12  10 - 20 mmol/L Final    Urea Nitrogen 06/20/2024 29 (H)  6 - 23 mg/dL Final    Creatinine 06/20/2024 1.48 (H)  0.50 - 1.30 mg/dL Final    eGFR 06/20/2024 48 (L)  >60 mL/min/1.73m*2 Final    Calculations of estimated GFR are performed using the 2021 CKD-EPI Study Refit equation without the race variable for the IDMS-Traceable creatinine methods.  https://jasn.asnjournals.org/content/early/2021/09/22/ASN.8758406455    Calcium 06/20/2024 9.1  8.6 - 10.3 mg/dL Final    Albumin 06/20/2024 3.9  3.4 - 5.0 g/dL Final    Alkaline Phosphatase 06/20/2024 60  33 - 136 U/L Final    Total Protein 06/20/2024 5.9 (L)  6.4 - 8.2 g/dL Final    AST 06/20/2024 21  9 - 39 U/L Final    Bilirubin, Total 06/20/2024 1.0  0.0 - 1.2 mg/dL Final    ALT 06/20/2024 20  10 - 52 U/L Final     Patients treated with Sulfasalazine may generate falsely decreased results for ALT.    Cholesterol 06/20/2024 113  0 - 199 mg/dL Final          Age      Desirable   Borderline High   High     0-19 Y     0 - 169       170 - 199     >/= 200    20-24 Y     0 - 189       190 - 224     >/= 225         >24 Y     0 - 199       200 - 239     >/= 240   **All ranges are based on fasting samples. Specific   therapeutic targets will vary based on patient-specific   cardiac risk.    Pediatric guidelines reference:Pediatrics 2011, 128(S5).Adult guidelines reference: NCEP ATPIII Guidelines,GELA 2001, 258:2486-97    Venipuncture immediately after or during the administration of Metamizole may lead to falsely low results. Testing should be performed immediately prior to Metamizole dosing.    HDL-Cholesterol 06/20/2024 33.0  mg/dL Final      Age       Very Low   Low     Normal    High    0-19 Y    < 35      < 40     40-45     ----  20-24 Y    ----     < 40      >45      ----        >24 Y      ----     < 40     40-60      >60      Cholesterol/HDL Ratio 06/20/2024 3.4   Final      Ref Values  Desirable  < 3.4  High Risk  > 5.0    LDL Calculated 06/20/2024 52  <=99 mg/dL Final                                Near   Borderline      AGE      Desirable  Optimal    High     High     Very High     0-19 Y     0 - 109     ---    110-129   >/= 130     ----    20-24 Y     0 - 119     ---    120-159   >/= 160     ----      >24 Y     0 -  99   100-129  130-159   160-189     >/=190      VLDL 06/20/2024 28  0 - 40 mg/dL Final    Triglycerides 06/20/2024 138  0 - 149 mg/dL Final       Age         Desirable   Borderline High   High     Very High   0 D-90 D    19 - 174         ----         ----        ----  91 D- 9 Y     0 -  74        75 -  99     >/= 100      ----    10-19 Y     0 -  89        90 - 129     >/= 130      ----    20-24 Y     0 - 114       115 - 149     >/= 150      ----         >24 Y     0 - 149       150 - 199    200- 499    >/=  500    Venipuncture immediately after or during the administration of Metamizole may lead to falsely low results. Testing should be performed immediately prior to Metamizole dosing.    Non HDL Cholesterol 06/20/2024 80  0 - 149 mg/dL Final          Age       Desirable   Borderline High   High     Very High     0-19 Y     0 - 119       120 - 144     >/= 145    >/= 160    20-24 Y     0 - 149       150 - 189     >/= 190      ----         >24 Y    30 mg/dL above LDL Cholesterol goal      Hemoglobin A1C 06/20/2024 5.7 (H)  see below % Final    Estimated Average Glucose 06/20/2024 117  Not Established mg/dL Final   Hospital Outpatient Visit on 06/03/2024   Component Date Value Ref Range Status    LVOT diam 06/03/2024 1.97  cm Final    LV Biplane EF 06/03/2024 62  % Final    MV E/A ratio 06/03/2024 0.97   Final    MV avg E/e' ratio 06/03/2024 21.93   Final    Tricuspid annular plane systolic e* 06/03/2024 2.5  cm Final    AV mn grad 06/03/2024 54.0  mmHg Final    LA vol index A/L 06/03/2024 34.4  ml/m2 Final    AV pk anabel 06/03/2024 4.78  m/s Final    RV free wall pk S' 06/03/2024 15.18  cm/s Final    LVIDd 06/03/2024 4.39  cm Final    Aortic Valve Area by Continuity of* 06/03/2024 0.68  cm2 Final    Aortic Valve Area by Continuity of* 06/03/2024 0.65  cm2 Final    AV pk grad 06/03/2024 91.4  mmHg Final    LV A4C EF 06/03/2024 61.3   Final       Assessment/Plan

## 2024-07-18 NOTE — PROGRESS NOTES
78 y.o. male presents today for follow up left base of thumb pain. The patient reports symptoms for years, relieved with shots, last one about 4 months ago. Pain is controlled. Patient reports no numbness and tingling.  Reports no previous surgeries or trauma to the area.  Reports pain worse with use, better at rest.  Pain dull ache, sharp at times. Would like another shot.     Review of Systems    Constitutional: no fever, no chills, not feeling tired, no recent weight gain and no recent weight loss.   ENT: no nosebleeds.   Cardiovascular: no chest pain.   Respiratory: no shortness of breath and no cough.   Gastrointestinal: no abdominal pain, no nausea, no vomiting and no diarrhea.   Musculoskeletal: per HPI  Integumentary: no rashes and no skin wound.   Neurological: no headache.   Psychiatric: no depression and no sleep disturbances.   Endocrine: no muscle weakness and no muscle cramps.   Hematologic/Lymphatic: no swollen glands and no tendency for easy bruising.     All other systems have been reviewed and are negative for complaint    Patient's past medical history, past surgical history, allergies, and medications have been reviewed unless otherwise noted in the chart.     CMC Arthritis Exam  Inspection:  no evidence of infection, no edema, no erythema, no ecchymosis, Palpation:  compartments are soft, pain with palpation over the Thumb CMC joint, Range of Motion:  full wrist and finger range of motion, Stability:  no wrist or finger instability detected, Strength:  5/5  and pinch strength, Skin:  intact, Vascular:  capillary refill <2 seconds distally, Sensation:  intact to light touch distally, Tests:  negative Finkelstein's , positive Thumb CMC Grind.      Constitutional   General appearance: Alert and in no acute distress. Well developed, well nourished.    Eyes   External Eye, Conjunctiva and lids: Normal external exam - pupils were equal in size, round, reactive to light (PERRL) with normal  accommodation and extraocular movements intact (EOMI).   Ears, Nose, Mouth, and Throat   Hearing: Normal.   Neck   Neck: No neck mass was observed. Supple.   Pulmonary   Respiratory effort: No respiratory distress.   Cardiovascular   Examination of extremities: No peripheral edema.   Abdomen   Abdomen: Soft nontender; no abdominal mass palpated.. No rebound, rigidity or guarding.    Skin   Skin and subcutaneous tissue: Normal skin color and pigmentation, normal skin turgor, and no rash.   Neurologic   Sensation: Normal.   Psychiatric   Judgment and insight: Intact.   Orientation to person, place, and time: Alert and oriented x 3.       Mood and affect: Normal.      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 After thorough discussion, the patient has elected to proceed with conservative management. All questions were answered to the patients satisfaction who seems satisfied with the plan.      Discussion I discussed the diagnosis and treatment options with the patient today along with their associated risks and benefits. After thorough discussion, the patient has elected to proceed with a corticosteroid injection with Kenalog and Xylocaine, which was performed in the office today under aseptic technique and the patient tolerated the procedure well.          Ortho Injections:           Injection site left thumb CMC. Medication 1 cc 1% Xylocaine, 1 cc 40 mg Kenalog, Injection given under sterile conditions. No immediate complications noted. Post injection instructions given.  FU 4 months prn - no XR    Patient ID: Michael Dougherty is a 78 y.o. male.    S Inj/Asp: L thumb CMC on 7/18/2024 10:54 AM  Indications: pain  Details: 25 G needle (dorsoradial) approach  Medications: 40 mg triamcinolone acetonide 40 mg/mL; 1 mL lidocaine 10 mg/mL (1 %)  Outcome: tolerated well, no immediate  complications  Procedure, treatment alternatives, risks and benefits explained, specific risks discussed. Consent was given by the patient. Immediately prior to procedure a time out was called to verify the correct patient, procedure, equipment, support staff and site/side marked as required. Patient was prepped and draped in the usual sterile fashion.

## 2024-07-18 NOTE — TELEPHONE ENCOUNTER
Pt got rybelsus and spouse does not feel ready to give this to pt and would like tresiba to be sent in to [facility]. Pt would need this to take tonight.

## 2024-08-14 DIAGNOSIS — Z79.4 TYPE 2 DIABETES MELLITUS WITHOUT COMPLICATION, WITH LONG-TERM CURRENT USE OF INSULIN (MULTI): ICD-10-CM

## 2024-08-14 DIAGNOSIS — E11.9 TYPE 2 DIABETES MELLITUS WITHOUT COMPLICATION, WITH LONG-TERM CURRENT USE OF INSULIN (MULTI): ICD-10-CM

## 2024-08-14 RX ORDER — BLOOD SUGAR DIAGNOSTIC
STRIP MISCELLANEOUS
Qty: 300 STRIP | Refills: 3 | Status: SHIPPED | OUTPATIENT
Start: 2024-08-14

## 2024-08-14 NOTE — TELEPHONE ENCOUNTER
Pt will be out of rx in the next few days. Wife requesting rf for qty 300 of one touch test strips.    Test strips - [facility]

## 2024-09-05 ENCOUNTER — CLINICAL SUPPORT (OUTPATIENT)
Dept: PRIMARY CARE | Facility: CLINIC | Age: 79
End: 2024-09-05
Payer: MEDICARE

## 2024-09-05 PROCEDURE — G0008 ADMIN INFLUENZA VIRUS VAC: HCPCS | Performed by: FAMILY MEDICINE

## 2024-09-05 PROCEDURE — 90662 IIV NO PRSV INCREASED AG IM: CPT | Performed by: FAMILY MEDICINE

## 2024-09-12 ENCOUNTER — LAB (OUTPATIENT)
Dept: LAB | Facility: LAB | Age: 79
End: 2024-09-12
Payer: MEDICARE

## 2024-09-12 DIAGNOSIS — Z79.4 TYPE 2 DIABETES MELLITUS WITHOUT COMPLICATION, WITH LONG-TERM CURRENT USE OF INSULIN (MULTI): ICD-10-CM

## 2024-09-12 DIAGNOSIS — E11.9 TYPE 2 DIABETES MELLITUS WITHOUT COMPLICATION, WITH LONG-TERM CURRENT USE OF INSULIN (MULTI): ICD-10-CM

## 2024-09-12 LAB
ALBUMIN SERPL BCP-MCNC: 4.1 G/DL (ref 3.4–5)
ALP SERPL-CCNC: 58 U/L (ref 33–136)
ALT SERPL W P-5'-P-CCNC: 25 U/L (ref 10–52)
ANION GAP SERPL CALC-SCNC: 11 MMOL/L (ref 10–20)
AST SERPL W P-5'-P-CCNC: 24 U/L (ref 9–39)
BASOPHILS # BLD AUTO: 0.08 X10*3/UL (ref 0–0.1)
BASOPHILS NFR BLD AUTO: 0.6 %
BILIRUB SERPL-MCNC: 0.9 MG/DL (ref 0–1.2)
BUN SERPL-MCNC: 27 MG/DL (ref 6–23)
CALCIUM SERPL-MCNC: 8.9 MG/DL (ref 8.6–10.3)
CHLORIDE SERPL-SCNC: 103 MMOL/L (ref 98–107)
CHOLEST SERPL-MCNC: 114 MG/DL (ref 0–199)
CHOLESTEROL/HDL RATIO: 3.5
CO2 SERPL-SCNC: 30 MMOL/L (ref 21–32)
CREAT SERPL-MCNC: 1.23 MG/DL (ref 0.5–1.3)
EGFRCR SERPLBLD CKD-EPI 2021: 60 ML/MIN/1.73M*2
EOSINOPHIL # BLD AUTO: 0.33 X10*3/UL (ref 0–0.4)
EOSINOPHIL NFR BLD AUTO: 2.5 %
ERYTHROCYTE [DISTWIDTH] IN BLOOD BY AUTOMATED COUNT: 14.9 % (ref 11.5–14.5)
EST. AVERAGE GLUCOSE BLD GHB EST-MCNC: 134 MG/DL
GLUCOSE SERPL-MCNC: 80 MG/DL (ref 74–99)
HBA1C MFR BLD: 6.3 %
HCT VFR BLD AUTO: 37.3 % (ref 41–52)
HDLC SERPL-MCNC: 32.7 MG/DL
HGB BLD-MCNC: 12 G/DL (ref 13.5–17.5)
IMM GRANULOCYTES # BLD AUTO: 0.02 X10*3/UL (ref 0–0.5)
IMM GRANULOCYTES NFR BLD AUTO: 0.2 % (ref 0–0.9)
LDLC SERPL CALC-MCNC: 53 MG/DL
LYMPHOCYTES # BLD AUTO: 8.1 X10*3/UL (ref 0.8–3)
LYMPHOCYTES NFR BLD AUTO: 61 %
MCH RBC QN AUTO: 27.8 PG (ref 26–34)
MCHC RBC AUTO-ENTMCNC: 32.2 G/DL (ref 32–36)
MCV RBC AUTO: 86 FL (ref 80–100)
MONOCYTES # BLD AUTO: 0.68 X10*3/UL (ref 0.05–0.8)
MONOCYTES NFR BLD AUTO: 5.1 %
NEUTROPHILS # BLD AUTO: 4.07 X10*3/UL (ref 1.6–5.5)
NEUTROPHILS NFR BLD AUTO: 30.6 %
NON HDL CHOLESTEROL: 81 MG/DL (ref 0–149)
NRBC BLD-RTO: 0 /100 WBCS (ref 0–0)
PLATELET # BLD AUTO: 92 X10*3/UL (ref 150–450)
POTASSIUM SERPL-SCNC: 3.8 MMOL/L (ref 3.5–5.3)
PROT SERPL-MCNC: 6.1 G/DL (ref 6.4–8.2)
RBC # BLD AUTO: 4.32 X10*6/UL (ref 4.5–5.9)
RBC MORPH BLD: NORMAL
SODIUM SERPL-SCNC: 140 MMOL/L (ref 136–145)
TRIGL SERPL-MCNC: 140 MG/DL (ref 0–149)
VLDL: 28 MG/DL (ref 0–40)
WBC # BLD AUTO: 13.3 X10*3/UL (ref 4.4–11.3)

## 2024-09-12 PROCEDURE — 80061 LIPID PANEL: CPT

## 2024-09-12 PROCEDURE — 85025 COMPLETE CBC W/AUTO DIFF WBC: CPT

## 2024-09-12 PROCEDURE — 36415 COLL VENOUS BLD VENIPUNCTURE: CPT

## 2024-09-12 PROCEDURE — 83036 HEMOGLOBIN GLYCOSYLATED A1C: CPT

## 2024-09-12 PROCEDURE — 80053 COMPREHEN METABOLIC PANEL: CPT

## 2024-09-19 ENCOUNTER — APPOINTMENT (OUTPATIENT)
Dept: PRIMARY CARE | Facility: CLINIC | Age: 79
End: 2024-09-19
Payer: MEDICARE

## 2024-09-19 VITALS
WEIGHT: 223 LBS | HEART RATE: 69 BPM | SYSTOLIC BLOOD PRESSURE: 122 MMHG | TEMPERATURE: 97.1 F | OXYGEN SATURATION: 97 % | BODY MASS INDEX: 32 KG/M2 | DIASTOLIC BLOOD PRESSURE: 64 MMHG

## 2024-09-19 DIAGNOSIS — E11.9 TYPE 2 DIABETES MELLITUS WITHOUT COMPLICATION, WITH LONG-TERM CURRENT USE OF INSULIN (MULTI): ICD-10-CM

## 2024-09-19 DIAGNOSIS — N18.31 STAGE 3A CHRONIC KIDNEY DISEASE (MULTI): Primary | ICD-10-CM

## 2024-09-19 DIAGNOSIS — I10 PRIMARY HYPERTENSION: ICD-10-CM

## 2024-09-19 DIAGNOSIS — I25.10 CORONARY ARTERIOSCLEROSIS: ICD-10-CM

## 2024-09-19 DIAGNOSIS — Z79.4 TYPE 2 DIABETES MELLITUS WITHOUT COMPLICATION, WITH LONG-TERM CURRENT USE OF INSULIN (MULTI): ICD-10-CM

## 2024-09-19 DIAGNOSIS — E78.2 MIXED HYPERLIPIDEMIA: ICD-10-CM

## 2024-09-19 PROCEDURE — 1159F MED LIST DOCD IN RCRD: CPT | Performed by: FAMILY MEDICINE

## 2024-09-19 PROCEDURE — 3078F DIAST BP <80 MM HG: CPT | Performed by: FAMILY MEDICINE

## 2024-09-19 PROCEDURE — 1036F TOBACCO NON-USER: CPT | Performed by: FAMILY MEDICINE

## 2024-09-19 PROCEDURE — 3074F SYST BP LT 130 MM HG: CPT | Performed by: FAMILY MEDICINE

## 2024-09-19 PROCEDURE — 99214 OFFICE O/P EST MOD 30 MIN: CPT | Performed by: FAMILY MEDICINE

## 2024-09-19 ASSESSMENT — PATIENT HEALTH QUESTIONNAIRE - PHQ9
1. LITTLE INTEREST OR PLEASURE IN DOING THINGS: NOT AT ALL
SUM OF ALL RESPONSES TO PHQ9 QUESTIONS 1 AND 2: 0
2. FEELING DOWN, DEPRESSED OR HOPELESS: NOT AT ALL

## 2024-09-19 NOTE — PROGRESS NOTES
Subjective   Patient ID: Michael Dougherty is a 78 y.o. male who presents for Hyperlipidemia and Hypertension (Recheck, review lab).  HPI    HTN: well controlled    DM2: a1c was 6.3 this time which is an improvement.  Doing well.    CRI: Kidneys were sl worse on bloodwork    Dementia: has been having worsening cognition in recent years.  Forgets tasks, directions.  Needs more help doing finances.  Mother had Alzheimers.        Patient Active Problem List   Diagnosis    Adverse effect of calcium-channel blocker    Atherosclerosis of aorta (CMS-HCC)    Cataracts, bilateral    Chondrocalcinosis of right knee    Coronary arteriosclerosis    Diabetes mellitus (Multi)    Gait abnormality    Heart murmur    Essential hypertension    Hyperglycemia due to type 2 diabetes mellitus (Multi)    Hyperlipidemia    Lesion of ulnar nerve, left upper limb    Localized primary osteoarthritis of carpometacarpal (CMC) joint of left wrist    Low back pain    Morbid obesity (Multi)    Nonadherence with dietary restriction    Stage 3a chronic kidney disease (Multi)    Sensorineural hearing loss (SNHL) of both ears    Anemia    Severe aortic valve stenosis    Cardiovascular stress test abnormal       Social Connections: Not on file       Current Outpatient Medications on File Prior to Visit   Medication Sig Dispense Refill    aspirin 81 mg EC tablet Take 1 tablet (81 mg) by mouth once daily.      B complex-vitamin C-folic acid (Nephro-Anneliese) 0.8 mg tablet Take 1 tablet by mouth once daily.      bisoprolol (Zebeta) 5 mg tablet Take 1 tablet (5 mg) by mouth once daily. 90 tablet 3    brimonidine (AlphaGAN P) 0.2 % ophthalmic solution Administer 1 drop into both eyes 2 times a day.      donepezil (Aricept) 5 mg tablet Take 1 tablet (5 mg) by mouth once daily at bedtime. 90 tablet 3    hydroCHLOROthiazide (HYDRODiuril) 25 mg tablet Take 1 tablet (25 mg) by mouth once daily. 90 tablet 3    insulin aspart, with niacinamide, (Fiasp FlexTouch U-100  "Insulin) 100 unit/mL (3 mL) injection Inject 35 Units under the skin 3 times a day with meals. Take as directed per insulin instructions. 94.5 mL 3    insulin degludec (Tresiba FlexTouch U-100) 100 unit/mL (3 mL) injection Inject 65 Units under the skin once daily at bedtime. Take as directed per insulin instructions. 58.5 mL 3    losartan (Cozaar) 100 mg tablet Take 1 tablet (100 mg) by mouth once daily. 90 tablet 3    OneTouch Ultra Test strip USE 1 TEST STRIP 3 TIMES A  strip 3    pen needle, diabetic 31 gauge x 5/16\" needle Use to inject 1-4 times daily as directed. 300 each 3    rosuvastatin (Crestor) 40 mg tablet Take 1 tablet (40 mg) by mouth once daily at bedtime. 90 tablet 3    timolol (Timoptic) 0.5 % ophthalmic solution Administer 1 drop into both eyes 2 times a day.      [DISCONTINUED] semaglutide (Rybelsus) 3 mg tablet Take 1 tablet (3 mg) by mouth once daily. 30 tablet 0     No current facility-administered medications on file prior to visit.        Vitals:    09/19/24 1328   BP: 122/64   Pulse: 69   Temp: 36.2 °C (97.1 °F)   SpO2: 97%     Vitals:    09/19/24 1328   Weight: 101 kg (223 lb)       Review of Systems   All other systems reviewed and are negative.      Objective     Physical Exam  Constitutional:       Appearance: Normal appearance. He is well-developed.   HENT:      Head: Atraumatic.   Cardiovascular:      Rate and Rhythm: Normal rate and regular rhythm.      Heart sounds: Normal heart sounds. No murmur heard.  Pulmonary:      Effort: Pulmonary effort is normal.      Breath sounds: Normal breath sounds.   Abdominal:      General: Bowel sounds are normal.      Palpations: Abdomen is soft.   Skin:     General: Skin is warm.   Neurological:      General: No focal deficit present.      Mental Status: He is alert.   Psychiatric:         Mood and Affect: Mood normal.         Lab on 09/12/2024   Component Date Value Ref Range Status    Glucose 09/12/2024 80  74 - 99 mg/dL Final    Sodium " 09/12/2024 140  136 - 145 mmol/L Final    Potassium 09/12/2024 3.8  3.5 - 5.3 mmol/L Final    Chloride 09/12/2024 103  98 - 107 mmol/L Final    Bicarbonate 09/12/2024 30  21 - 32 mmol/L Final    Anion Gap 09/12/2024 11  10 - 20 mmol/L Final    Urea Nitrogen 09/12/2024 27 (H)  6 - 23 mg/dL Final    Creatinine 09/12/2024 1.23  0.50 - 1.30 mg/dL Final    eGFR 09/12/2024 60 (L)  >60 mL/min/1.73m*2 Final    Calculations of estimated GFR are performed using the 2021 CKD-EPI Study Refit equation without the race variable for the IDMS-Traceable creatinine methods.  https://jasn.asnjournals.org/content/early/2021/09/22/ASN.1846466596    Calcium 09/12/2024 8.9  8.6 - 10.3 mg/dL Final    Albumin 09/12/2024 4.1  3.4 - 5.0 g/dL Final    Alkaline Phosphatase 09/12/2024 58  33 - 136 U/L Final    Total Protein 09/12/2024 6.1 (L)  6.4 - 8.2 g/dL Final    AST 09/12/2024 24  9 - 39 U/L Final    Bilirubin, Total 09/12/2024 0.9  0.0 - 1.2 mg/dL Final    ALT 09/12/2024 25  10 - 52 U/L Final    Patients treated with Sulfasalazine may generate falsely decreased results for ALT.    Cholesterol 09/12/2024 114  0 - 199 mg/dL Final          Age      Desirable   Borderline High   High     0-19 Y     0 - 169       170 - 199     >/= 200    20-24 Y     0 - 189       190 - 224     >/= 225         >24 Y     0 - 199       200 - 239     >/= 240   **All ranges are based on fasting samples. Specific   therapeutic targets will vary based on patient-specific   cardiac risk.    Pediatric guidelines reference:Pediatrics 2011, 128(S5).Adult guidelines reference: NCEP ATPIII Guidelines,GELA 2001, 258:2486-97    Venipuncture immediately after or during the administration of Metamizole may lead to falsely low results. Testing should be performed immediately prior to Metamizole dosing.    HDL-Cholesterol 09/12/2024 32.7  mg/dL Final      Age       Very Low   Low     Normal    High    0-19 Y    < 35      < 40     40-45     ----  20-24 Y    ----     < 40      >45       ----        >24 Y      ----     < 40     40-60      >60      Cholesterol/HDL Ratio 09/12/2024 3.5   Final      Ref Values  Desirable  < 3.4  High Risk  > 5.0    LDL Calculated 09/12/2024 53  <=99 mg/dL Final                                Near   Borderline      AGE      Desirable  Optimal    High     High     Very High     0-19 Y     0 - 109     ---    110-129   >/= 130     ----    20-24 Y     0 - 119     ---    120-159   >/= 160     ----      >24 Y     0 -  99   100-129  130-159   160-189     >/=190      VLDL 09/12/2024 28  0 - 40 mg/dL Final    Triglycerides 09/12/2024 140  0 - 149 mg/dL Final       Age         Desirable   Borderline High   High     Very High   0 D-90 D    19 - 174         ----         ----        ----  91 D- 9 Y     0 -  74        75 -  99     >/= 100      ----    10-19 Y     0 -  89        90 - 129     >/= 130      ----    20-24 Y     0 - 114       115 - 149     >/= 150      ----         >24 Y     0 - 149       150 - 199    200- 499    >/= 500    Venipuncture immediately after or during the administration of Metamizole may lead to falsely low results. Testing should be performed immediately prior to Metamizole dosing.    Non HDL Cholesterol 09/12/2024 81  0 - 149 mg/dL Final          Age       Desirable   Borderline High   High     Very High     0-19 Y     0 - 119       120 - 144     >/= 145    >/= 160    20-24 Y     0 - 149       150 - 189     >/= 190      ----         >24 Y    30 mg/dL above LDL Cholesterol goal      Hemoglobin A1C 09/12/2024 6.3 (H)  see below % Final    Estimated Average Glucose 09/12/2024 134  Not Established mg/dL Final    WBC 09/12/2024 13.3 (H)  4.4 - 11.3 x10*3/uL Final    nRBC 09/12/2024 0.0  0.0 - 0.0 /100 WBCs Final    RBC 09/12/2024 4.32 (L)  4.50 - 5.90 x10*6/uL Final    Hemoglobin 09/12/2024 12.0 (L)  13.5 - 17.5 g/dL Final    Hematocrit 09/12/2024 37.3 (L)  41.0 - 52.0 % Final    MCV 09/12/2024 86  80 - 100 fL Final    MCH 09/12/2024 27.8  26.0 - 34.0 pg  Final    MCHC 09/12/2024 32.2  32.0 - 36.0 g/dL Final    RDW 09/12/2024 14.9 (H)  11.5 - 14.5 % Final    Platelets 09/12/2024 92 (L)  150 - 450 x10*3/uL Final    Platelet count verified by smear review.    Neutrophils % 09/12/2024 30.6  40.0 - 80.0 % Final    Immature Granulocytes %, Automated 09/12/2024 0.2  0.0 - 0.9 % Final    Immature Granulocyte Count (IG) includes promyelocytes, myelocytes and metamyelocytes but does not include bands. Percent differential counts (%) should be interpreted in the context of the absolute cell counts (cells/UL).    Lymphocytes % 09/12/2024 61.0  13.0 - 44.0 % Final    Monocytes % 09/12/2024 5.1  2.0 - 10.0 % Final    Eosinophils % 09/12/2024 2.5  0.0 - 6.0 % Final    Basophils % 09/12/2024 0.6  0.0 - 2.0 % Final    Neutrophils Absolute 09/12/2024 4.07  1.60 - 5.50 x10*3/uL Final    Percent differential counts (%) should be interpreted in the context of the absolute cell counts (cells/uL).    Immature Granulocytes Absolute, Au* 09/12/2024 0.02  0.00 - 0.50 x10*3/uL Final    Lymphocytes Absolute 09/12/2024 8.10 (H)  0.80 - 3.00 x10*3/uL Final    Monocytes Absolute 09/12/2024 0.68  0.05 - 0.80 x10*3/uL Final    Eosinophils Absolute 09/12/2024 0.33  0.00 - 0.40 x10*3/uL Final    Basophils Absolute 09/12/2024 0.08  0.00 - 0.10 x10*3/uL Final    Automated WBC differential has been confirmed by manual smear.    RBC Morphology 09/12/2024 No significant RBC morphology present   Final       Assessment/Plan   Problem List Items Addressed This Visit             ICD-10-CM    Coronary arteriosclerosis I25.10    Diabetes mellitus (Multi) E11.9    Hyperlipidemia E78.5    Stage 3a chronic kidney disease (Multi) - Primary N18.31     Other Visit Diagnoses         Codes    Primary hypertension     I10          Refilled patient's medications.  Doing well.  Fu in 4 mo

## 2024-11-14 ENCOUNTER — APPOINTMENT (OUTPATIENT)
Dept: ORTHOPEDIC SURGERY | Facility: CLINIC | Age: 79
End: 2024-11-14
Payer: MEDICARE

## 2024-11-14 DIAGNOSIS — M19.032 LOCALIZED PRIMARY OSTEOARTHRITIS OF CARPOMETACARPAL (CMC) JOINT OF LEFT WRIST: Primary | ICD-10-CM

## 2024-11-14 PROCEDURE — 20600 DRAIN/INJ JOINT/BURSA W/O US: CPT | Mod: LT | Performed by: ORTHOPAEDIC SURGERY

## 2024-11-14 PROCEDURE — 2500000004 HC RX 250 GENERAL PHARMACY W/ HCPCS (ALT 636 FOR OP/ED): Performed by: ORTHOPAEDIC SURGERY

## 2024-11-14 PROCEDURE — 99214 OFFICE O/P EST MOD 30 MIN: CPT | Performed by: ORTHOPAEDIC SURGERY

## 2024-11-14 RX ORDER — LIDOCAINE HYDROCHLORIDE 10 MG/ML
1 INJECTION, SOLUTION INFILTRATION; PERINEURAL
Status: COMPLETED | OUTPATIENT
Start: 2024-11-14 | End: 2024-11-14

## 2024-11-14 RX ORDER — TRIAMCINOLONE ACETONIDE 40 MG/ML
40 INJECTION, SUSPENSION INTRA-ARTICULAR; INTRAMUSCULAR
Status: COMPLETED | OUTPATIENT
Start: 2024-11-14 | End: 2024-11-14

## 2024-11-14 NOTE — PROGRESS NOTES
79 y.o. male presents today for follow up left base of thumb pain. The patient reports symptoms for years, relieved with shots, last one about 4 months ago. Pain is controlled. Patient reports no numbness and tingling.  Reports no previous surgeries or trauma to the area.  Reports pain worse with use, better at rest.  Pain dull ache, sharp at times. Would like another shot.     Review of Systems    Constitutional: no fever, no chills, not feeling tired, no recent weight gain and no recent weight loss.   ENT: no nosebleeds.   Cardiovascular: no chest pain.   Respiratory: no shortness of breath and no cough.   Gastrointestinal: no abdominal pain, no nausea, no vomiting and no diarrhea.   Musculoskeletal: per HPI  Integumentary: no rashes and no skin wound.   Neurological: no headache.   Psychiatric: no depression and no sleep disturbances.   Endocrine: no muscle weakness and no muscle cramps.   Hematologic/Lymphatic: no swollen glands and no tendency for easy bruising.     All other systems have been reviewed and are negative for complaint    Patient's past medical history, past surgical history, allergies, and medications have been reviewed unless otherwise noted in the chart.     CMC Arthritis Exam  Inspection:  no evidence of infection, no edema, no erythema, no ecchymosis, Palpation:  compartments are soft, pain with palpation over the Thumb CMC joint, Range of Motion:  full wrist and finger range of motion, Stability:  no wrist or finger instability detected, Strength:  5/5  and pinch strength, Skin:  intact, Vascular:  capillary refill <2 seconds distally, Sensation:  intact to light touch distally, Tests:  negative Finkelstein's , positive Thumb CMC Grind.      Constitutional   General appearance: Alert and in no acute distress. Well developed, well nourished.    Eyes   External Eye, Conjunctiva and lids: Normal external exam - pupils were equal in size, round, reactive to light (PERRL) with normal  accommodation and extraocular movements intact (EOMI).   Ears, Nose, Mouth, and Throat   Hearing: Normal.   Neck   Neck: No neck mass was observed. Supple.   Pulmonary   Respiratory effort: No respiratory distress.   Cardiovascular   Examination of extremities: No peripheral edema.   Abdomen   Abdomen: Soft nontender; no abdominal mass palpated.. No rebound, rigidity or guarding.    Skin   Skin and subcutaneous tissue: Normal skin color and pigmentation, normal skin turgor, and no rash.   Neurologic   Sensation: Normal.   Psychiatric   Judgment and insight: Intact.   Orientation to person, place, and time: Alert and oriented x 3.       Mood and affect: Normal.      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 After thorough discussion, the patient has elected to proceed with conservative management. All questions were answered to the patients satisfaction who seems satisfied with the plan.      Discussion I discussed the diagnosis and treatment options with the patient today along with their associated risks and benefits. After thorough discussion, the patient has elected to proceed with a corticosteroid injection with Kenalog and Xylocaine, which was performed in the office today under aseptic technique and the patient tolerated the procedure well.          Ortho Injections:           Injection site left thumb CMC. Medication 1 cc 1% Xylocaine, 1 cc 40 mg Kenalog, Injection given under sterile conditions. No immediate complications noted. Post injection instructions given.  FU 4 months prn - no XR    Patient ID: Michael Dougherty is a 79 y.o. male.    S Inj/Asp: L thumb CMC on 11/14/2024 11:06 AM  Indications: pain  Details: 25 G needle (dorsoradial) approach  Medications: 40 mg triamcinolone acetonide 40 mg/mL; 1 mL lidocaine 10 mg/mL (1 %)  Outcome: tolerated well, no immediate  complications  Procedure, treatment alternatives, risks and benefits explained, specific risks discussed. Consent was given by the patient. Immediately prior to procedure a time out was called to verify the correct patient, procedure, equipment, support staff and site/side marked as required. Patient was prepped and draped in the usual sterile fashion.

## 2025-01-04 ASSESSMENT — ENCOUNTER SYMPTOMS
ORTHOPNEA: 0
PALPITATIONS: 0
FOCAL WEAKNESS: 0
SHORTNESS OF BREATH: 0
FALLS: 0
COUGH: 0
GASTROINTESTINAL NEGATIVE: 1
SYNCOPE: 0
IRREGULAR HEARTBEAT: 0
DYSPNEA ON EXERTION: 0
RESPIRATORY NEGATIVE: 1
DEPRESSION: 0
BLURRED VISION: 0

## 2025-01-04 NOTE — PROGRESS NOTES
Chief Complaint/Reason for Visit:   Patient is coming in today as a 6 month Cardiovascular follow up.     History Of Present Illness:    Mr Dougherty is coming today as a 6-month cardiovascular follow-up.  We have followed this patient previously for aortic valve stenosis, hypertension, and dyslipidemia.  At his most recent office visit, the nature of progressive aortic valve stenosis was discussed.  He was asymptomatic at that time but understands that he potentially will start to become symptomatic as his valve progresses.    Patient comes in today accompanied by his wife.  He has had no recent hospitalizations or emergency room visits.  Patient has not had any chest pain, pressure, palpitations, orthopnea, or edema.  He has not had any syncopal or near syncopal episodes and on occasion has some mild lightheadedness.  Wife reports that he has some ongoing memory issues.  Patient has been good about taking his medication as prescribed.    Past Medical History:  He has no past medical history on file.    Past Surgical History:  He has a past surgical history that includes Hand surgery (12/04/2014); Other surgical history (06/10/2021); Other surgical history (06/10/2021); Other surgical history (06/10/2021); Other surgical history (06/10/2021); Other surgical history (06/10/2021); and Other surgical history (06/10/2021).      Social History:  He reports that he has quit smoking. His smoking use included cigarettes. He has never used smokeless tobacco. He reports that he does not currently use alcohol. He reports that he does not use drugs.    Family History:  Family History   Problem Relation Name Age of Onset    Diabetes Mother      Hypertension Father      Diabetes Father      Coronary artery disease Sister      Diabetes Brother      Melanoma Brother          Allergies:  Ezetimibe, Fenofibrate, Niacin, and Doxycycline    Medications:  Current Outpatient Medications   Medication Instructions    aspirin 81 mg, Daily    B  "complex-vitamin C-folic acid (Nephro-Anneliese) 0.8 mg tablet 0.8 mg, Daily    bisoprolol (ZEBETA) 5 mg, oral, Daily    brimonidine (AlphaGAN P) 0.2 % ophthalmic solution 1 drop, 2 times daily    donepezil (ARICEPT) 5 mg, oral, Nightly    hydroCHLOROthiazide (HYDRODIURIL) 25 mg, oral, Daily    insulin aspart, with niacinamide, (Fiasp FlexTouch U-100 Insulin) 100 unit/mL (3 mL) injection 35 Units, subcutaneous, 3 times daily (morning, midday, late afternoon), Take as directed per insulin instructions.    insulin degludec (TRESIBA FLEXTOUCH U-100) 65 Units, subcutaneous, Nightly, Take as directed per insulin instructions.    losartan (COZAAR) 100 mg, oral, Daily    OneTouch Ultra Test strip USE 1 TEST STRIP 3 TIMES A DAY    pen needle, diabetic 31 gauge x 5/16\" needle Use to inject 1-4 times daily as directed.    rosuvastatin (CRESTOR) 40 mg, oral, Nightly    timolol (Timoptic) 0.5 % ophthalmic solution 1 drop, 2 times daily       Review of Systems:  Review of Systems   Constitutional: Positive for decreased appetite. Negative for malaise/fatigue.   HENT: Negative.     Eyes:  Negative for blurred vision and visual disturbance.   Cardiovascular:  Negative for chest pain, dyspnea on exertion, irregular heartbeat, leg swelling, orthopnea, palpitations and syncope.   Respiratory: Negative.  Negative for cough and shortness of breath.    Musculoskeletal:  Negative for arthritis and falls.   Gastrointestinal: Negative.    Neurological:  Positive for light-headedness. Negative for focal weakness.   Psychiatric/Behavioral:  Positive for memory loss. Negative for depression.         Vitals  Visit Vitals  /58   Pulse 58   Ht 1.778 m (5' 10\")   Wt 99.8 kg (220 lb)   SpO2 98%   BMI 31.57 kg/m²   Smoking Status Former   BSA 2.22 m²        Physical Exam:  Physical Exam  Constitutional:       Appearance: Normal appearance.   HENT:      Head: Normocephalic.   Eyes:      Conjunctiva/sclera: Conjunctivae normal.   Cardiovascular:      " Rate and Rhythm: Normal rate and regular rhythm.      Pulses: Normal pulses.      Heart sounds: S1 normal and S2 normal. Murmur heard.      No friction rub. No gallop.   Pulmonary:      Effort: Pulmonary effort is normal.      Breath sounds: Normal breath sounds.   Abdominal:      General: Bowel sounds are normal.      Palpations: Abdomen is soft.      Tenderness: There is no abdominal tenderness.   Musculoskeletal:      Cervical back: Neck supple.      Right lower leg: No edema.      Left lower leg: No edema.   Skin:     General: Skin is warm and dry.   Neurological:      General: No focal deficit present.      Mental Status: He is alert and oriented to person, place, and time.   Psychiatric:         Attention and Perception: Attention normal.         Mood and Affect: Mood normal.            Last Labs:  CBC -  Lab Results   Component Value Date    WBC 13.3 (H) 09/12/2024    HGB 12.0 (L) 09/12/2024    HCT 37.3 (L) 09/12/2024    MCV 86 09/12/2024    PLT 92 (L) 09/12/2024     Lab Results   Component Value Date    GLUCOSE 80 09/12/2024    CALCIUM 8.9 09/12/2024     09/12/2024    K 3.8 09/12/2024    CO2 30 09/12/2024     09/12/2024    BUN 27 (H) 09/12/2024    CREATININE 1.23 09/12/2024      CMP -  Lab Results   Component Value Date    CALCIUM 8.9 09/12/2024    PROT 6.1 (L) 09/12/2024    ALBUMIN 4.1 09/12/2024    AST 24 09/12/2024    ALT 25 09/12/2024    ALKPHOS 58 09/12/2024    BILITOT 0.9 09/12/2024       LIPID PANEL -   Lab Results   Component Value Date    CHOL 114 09/12/2024    TRIG 140 09/12/2024    HDL 32.7 09/12/2024    CHHDL 3.5 09/12/2024    LDLF 45 05/27/2023    VLDL 28 09/12/2024    NHDL 81 09/12/2024       Lab Results   Component Value Date    HGBA1C 6.3 (H) 09/12/2024       Last Cardiology Tests:    Echo:6-3-24  CONCLUSIONS:   1. Left ventricular systolic function is normal with a 60-65% estimated ejection fraction.   2. Spectral Doppler shows an impaired relaxation pattern of left ventricular  diastolic filling.   3. There are elevated left atrial and left ventricular end diastolic pressures.   4. There is moderate concentric left ventricular hypertrophy.   5. Severe aortic valve stenosis.   6. There is moderate to severe aortic valve cusp calcification.   7. Mild aortic valve regurgitation.    Stress: 2-23-24  Impression   1. SPECT stress myocardial perfusion imaging in response to  pharmacologic stress demonstrate a large, severe, fixed perfusion  defect in the inferior wall extending to the apex consistent with  evidence of prior infarction. There is no evidence of reversible  ischemia.  2. Mildly reduced left ventricular ejection fraction of 49%.       Summary:   1. Patient was ordered to undergo exercise treadmill stress test. Test was changed to regadenoson stress test due to severe AS and slow gait.   2. No clinical or electrocardiographic evidence for ischemia at maximal infusion.   3. Nuclear image results are reported separately.       Lab review: I have personally reviewed the laboratory result(s) (from: Sept, 2024)    Assessment/Plan:  Aortic valve stenosis: Echocardiogram done in June 2024 showed severe aortic valve stenosis, mild AI.  Patient remains asymptomatic.  He he and his wife are aware of the natural progression of aortic valve stenosis.  He is scheduled for a follow-up echocardiogram in July.  Patient's heart rate and blood pressure are well-controlled.    Hypertension: Blood pressure today was initially mildly elevated after resting it came down to 132/58.  His blood pressure is generally well-controlled.  Patient will continue on hydrochlorothiazide 25 mg daily and losartan 100 mg daily.    Dyslipidemia: Lipid labs from September, 2024 show triglycerides 140, LDL 53.  Patient will continue on rosuvastatin 40 mg nightly.  I encouraged him to continue to eat a heart healthy low-sodium diet.    Patient will get his echocardiogram done in July and will follow-up with Dr. Manzo  shortly after.  I reminded him that he is due for fasting labs this month, ordered by his PCP.  Patient instructed to call with any cardiovascular complaints. All questions were answered.       Dragon dictation was utilized to create this document. Quite often unanticipated grammatical, syntax,  and other interpretive errors are inadvertently transcribed by the computer software.  Please disregard these errors.  Please excuse any errors that have escaped final proofreading.        Gabriella Miller, JOVITA-CNP

## 2025-01-07 ENCOUNTER — APPOINTMENT (OUTPATIENT)
Dept: CARDIOLOGY | Facility: CLINIC | Age: 80
End: 2025-01-07
Payer: MEDICARE

## 2025-01-07 VITALS
HEIGHT: 70 IN | DIASTOLIC BLOOD PRESSURE: 58 MMHG | OXYGEN SATURATION: 98 % | HEART RATE: 58 BPM | BODY MASS INDEX: 31.5 KG/M2 | WEIGHT: 220 LBS | SYSTOLIC BLOOD PRESSURE: 132 MMHG

## 2025-01-07 DIAGNOSIS — I10 ESSENTIAL HYPERTENSION: ICD-10-CM

## 2025-01-07 DIAGNOSIS — I35.0 NONRHEUMATIC AORTIC VALVE STENOSIS: Primary | ICD-10-CM

## 2025-01-07 DIAGNOSIS — E78.5 DYSLIPIDEMIA: ICD-10-CM

## 2025-01-07 PROCEDURE — 99213 OFFICE O/P EST LOW 20 MIN: CPT | Performed by: NURSE PRACTITIONER

## 2025-01-07 PROCEDURE — 1159F MED LIST DOCD IN RCRD: CPT | Performed by: NURSE PRACTITIONER

## 2025-01-07 PROCEDURE — 1160F RVW MEDS BY RX/DR IN RCRD: CPT | Performed by: NURSE PRACTITIONER

## 2025-01-07 PROCEDURE — 1036F TOBACCO NON-USER: CPT | Performed by: NURSE PRACTITIONER

## 2025-01-07 PROCEDURE — 3078F DIAST BP <80 MM HG: CPT | Performed by: NURSE PRACTITIONER

## 2025-01-07 PROCEDURE — 3075F SYST BP GE 130 - 139MM HG: CPT | Performed by: NURSE PRACTITIONER

## 2025-01-07 ASSESSMENT — ENCOUNTER SYMPTOMS
DECREASED APPETITE: 1
MEMORY LOSS: 1
LIGHT-HEADEDNESS: 1

## 2025-01-07 NOTE — PATIENT INSTRUCTIONS
Continue on current meds  Heart healthy, low sodium diet  Mediterranean diet is recommended  Fasting labs are due this month   Echo in July  Follow up with Dr Manzo in July

## 2025-01-10 ENCOUNTER — LAB (OUTPATIENT)
Dept: LAB | Facility: LAB | Age: 80
End: 2025-01-10
Payer: MEDICARE

## 2025-01-10 DIAGNOSIS — E11.9 TYPE 2 DIABETES MELLITUS WITHOUT COMPLICATION, WITH LONG-TERM CURRENT USE OF INSULIN (MULTI): ICD-10-CM

## 2025-01-10 DIAGNOSIS — Z79.4 TYPE 2 DIABETES MELLITUS WITHOUT COMPLICATION, WITH LONG-TERM CURRENT USE OF INSULIN (MULTI): ICD-10-CM

## 2025-01-10 LAB
ALBUMIN SERPL BCP-MCNC: 4 G/DL (ref 3.4–5)
ALP SERPL-CCNC: 56 U/L (ref 33–136)
ALT SERPL W P-5'-P-CCNC: 26 U/L (ref 10–52)
ANION GAP SERPL CALC-SCNC: 9 MMOL/L (ref 10–20)
AST SERPL W P-5'-P-CCNC: 24 U/L (ref 9–39)
BILIRUB SERPL-MCNC: 0.7 MG/DL (ref 0–1.2)
BUN SERPL-MCNC: 27 MG/DL (ref 6–23)
CALCIUM SERPL-MCNC: 9.1 MG/DL (ref 8.6–10.3)
CHLORIDE SERPL-SCNC: 105 MMOL/L (ref 98–107)
CHOLEST SERPL-MCNC: 120 MG/DL (ref 0–199)
CHOLESTEROL/HDL RATIO: 3.5
CO2 SERPL-SCNC: 30 MMOL/L (ref 21–32)
CREAT SERPL-MCNC: 1.12 MG/DL (ref 0.5–1.3)
EGFRCR SERPLBLD CKD-EPI 2021: 67 ML/MIN/1.73M*2
EST. AVERAGE GLUCOSE BLD GHB EST-MCNC: 123 MG/DL
GLUCOSE SERPL-MCNC: 113 MG/DL (ref 74–99)
HBA1C MFR BLD: 5.9 %
HDLC SERPL-MCNC: 34 MG/DL
LDLC SERPL CALC-MCNC: 63 MG/DL
NON HDL CHOLESTEROL: 86 MG/DL (ref 0–149)
POTASSIUM SERPL-SCNC: 4 MMOL/L (ref 3.5–5.3)
PROT SERPL-MCNC: 5.7 G/DL (ref 6.4–8.2)
SODIUM SERPL-SCNC: 140 MMOL/L (ref 136–145)
TRIGL SERPL-MCNC: 115 MG/DL (ref 0–149)
VLDL: 23 MG/DL (ref 0–40)

## 2025-01-10 PROCEDURE — 83036 HEMOGLOBIN GLYCOSYLATED A1C: CPT

## 2025-01-10 PROCEDURE — 80053 COMPREHEN METABOLIC PANEL: CPT

## 2025-01-10 PROCEDURE — 80061 LIPID PANEL: CPT

## 2025-01-16 ENCOUNTER — APPOINTMENT (OUTPATIENT)
Dept: PRIMARY CARE | Facility: CLINIC | Age: 80
End: 2025-01-16
Payer: MEDICARE

## 2025-01-16 VITALS
BODY MASS INDEX: 31.64 KG/M2 | HEIGHT: 70 IN | WEIGHT: 221 LBS | SYSTOLIC BLOOD PRESSURE: 124 MMHG | HEART RATE: 55 BPM | DIASTOLIC BLOOD PRESSURE: 76 MMHG | TEMPERATURE: 97.4 F | OXYGEN SATURATION: 97 %

## 2025-01-16 DIAGNOSIS — Z79.4 DIABETES MELLITUS TREATED WITH INSULIN (MULTI): ICD-10-CM

## 2025-01-16 DIAGNOSIS — I10 PRIMARY HYPERTENSION: ICD-10-CM

## 2025-01-16 DIAGNOSIS — E78.2 MIXED HYPERLIPIDEMIA: ICD-10-CM

## 2025-01-16 DIAGNOSIS — R35.0 FREQUENCY OF URINATION: Primary | ICD-10-CM

## 2025-01-16 DIAGNOSIS — E11.9 DIABETES MELLITUS TREATED WITH INSULIN (MULTI): ICD-10-CM

## 2025-01-16 DIAGNOSIS — E11.9 TYPE 2 DIABETES MELLITUS WITHOUT COMPLICATION, WITH LONG-TERM CURRENT USE OF INSULIN (MULTI): ICD-10-CM

## 2025-01-16 DIAGNOSIS — F03.90 DEMENTIA WITHOUT BEHAVIORAL DISTURBANCE, PSYCHOTIC DISTURBANCE, MOOD DISTURBANCE, OR ANXIETY, UNSPECIFIED DEMENTIA SEVERITY, UNSPECIFIED DEMENTIA TYPE: ICD-10-CM

## 2025-01-16 DIAGNOSIS — I25.10 CORONARY ARTERIOSCLEROSIS: ICD-10-CM

## 2025-01-16 DIAGNOSIS — Z79.4 TYPE 2 DIABETES MELLITUS WITHOUT COMPLICATION, WITH LONG-TERM CURRENT USE OF INSULIN (MULTI): ICD-10-CM

## 2025-01-16 LAB
POC ALBUMIN /CREATININE RATIO MANUALLY ENTERED: <30 UG/MG CREAT
POC APPEARANCE, URINE: CLEAR
POC BILIRUBIN, URINE: NEGATIVE
POC BLOOD, URINE: NEGATIVE
POC COLOR, URINE: YELLOW
POC GLUCOSE, URINE: NEGATIVE MG/DL
POC KETONES, URINE: NEGATIVE MG/DL
POC LEUKOCYTES, URINE: NEGATIVE
POC NITRITE,URINE: NEGATIVE
POC PH, URINE: 7 PH
POC PROTEIN, URINE: NEGATIVE MG/DL
POC SPECIFIC GRAVITY, URINE: 1.02
POC URINE ALBUMIN: 10 MG/L
POC URINE CREATININE: 200 MG/DL
POC UROBILINOGEN, URINE: 1 EU/DL

## 2025-01-16 PROCEDURE — G0439 PPPS, SUBSEQ VISIT: HCPCS | Performed by: FAMILY MEDICINE

## 2025-01-16 PROCEDURE — 1123F ACP DISCUSS/DSCN MKR DOCD: CPT | Performed by: FAMILY MEDICINE

## 2025-01-16 PROCEDURE — 81003 URINALYSIS AUTO W/O SCOPE: CPT | Performed by: FAMILY MEDICINE

## 2025-01-16 PROCEDURE — 1170F FXNL STATUS ASSESSED: CPT | Performed by: FAMILY MEDICINE

## 2025-01-16 PROCEDURE — 1159F MED LIST DOCD IN RCRD: CPT | Performed by: FAMILY MEDICINE

## 2025-01-16 PROCEDURE — 82044 UR ALBUMIN SEMIQUANTITATIVE: CPT | Performed by: FAMILY MEDICINE

## 2025-01-16 PROCEDURE — 3078F DIAST BP <80 MM HG: CPT | Performed by: FAMILY MEDICINE

## 2025-01-16 PROCEDURE — 99214 OFFICE O/P EST MOD 30 MIN: CPT | Performed by: FAMILY MEDICINE

## 2025-01-16 PROCEDURE — 1036F TOBACCO NON-USER: CPT | Performed by: FAMILY MEDICINE

## 2025-01-16 PROCEDURE — 1158F ADVNC CARE PLAN TLK DOCD: CPT | Performed by: FAMILY MEDICINE

## 2025-01-16 PROCEDURE — 3074F SYST BP LT 130 MM HG: CPT | Performed by: FAMILY MEDICINE

## 2025-01-16 RX ORDER — INSULIN ASPART INJECTION 100 [IU]/ML
35 INJECTION, SOLUTION SUBCUTANEOUS
Qty: 94.5 ML | Refills: 3 | Status: SHIPPED | OUTPATIENT
Start: 2025-01-16 | End: 2025-01-16 | Stop reason: SDUPTHER

## 2025-01-16 RX ORDER — INSULIN DEGLUDEC 100 U/ML
65 INJECTION, SOLUTION SUBCUTANEOUS NIGHTLY
Qty: 58.5 ML | Refills: 3 | Status: SHIPPED | OUTPATIENT
Start: 2025-01-16 | End: 2026-01-16

## 2025-01-16 RX ORDER — DONEPEZIL HYDROCHLORIDE 5 MG/1
5 TABLET, FILM COATED ORAL NIGHTLY
Qty: 90 TABLET | Refills: 3 | Status: SHIPPED | OUTPATIENT
Start: 2025-01-16 | End: 2026-01-16

## 2025-01-16 RX ORDER — INSULIN ASPART INJECTION 100 [IU]/ML
35 INJECTION, SOLUTION SUBCUTANEOUS
Qty: 94.5 ML | Refills: 3 | Status: SHIPPED | OUTPATIENT
Start: 2025-01-16 | End: 2026-01-16

## 2025-01-16 RX ORDER — DONEPEZIL HYDROCHLORIDE 5 MG/1
5 TABLET, FILM COATED ORAL NIGHTLY
Qty: 90 TABLET | Refills: 3 | Status: SHIPPED | OUTPATIENT
Start: 2025-01-16 | End: 2025-01-16 | Stop reason: SDUPTHER

## 2025-01-16 RX ORDER — ROSUVASTATIN CALCIUM 40 MG/1
40 TABLET, COATED ORAL NIGHTLY
Qty: 90 TABLET | Refills: 3 | Status: SHIPPED | OUTPATIENT
Start: 2025-01-16 | End: 2025-01-16 | Stop reason: SDUPTHER

## 2025-01-16 RX ORDER — BISOPROLOL FUMARATE 5 MG/1
5 TABLET, FILM COATED ORAL DAILY
Qty: 90 TABLET | Refills: 3 | Status: SHIPPED | OUTPATIENT
Start: 2025-01-16 | End: 2026-01-16

## 2025-01-16 RX ORDER — BISOPROLOL FUMARATE 5 MG/1
5 TABLET, FILM COATED ORAL DAILY
Qty: 90 TABLET | Refills: 3 | Status: SHIPPED | OUTPATIENT
Start: 2025-01-16 | End: 2025-01-16 | Stop reason: SDUPTHER

## 2025-01-16 RX ORDER — INSULIN DEGLUDEC 100 U/ML
65 INJECTION, SOLUTION SUBCUTANEOUS NIGHTLY
Qty: 58.5 ML | Refills: 3 | Status: SHIPPED | OUTPATIENT
Start: 2025-01-16 | End: 2025-01-16 | Stop reason: SDUPTHER

## 2025-01-16 RX ORDER — HYDROCHLOROTHIAZIDE 25 MG/1
25 TABLET ORAL DAILY
Qty: 90 TABLET | Refills: 3 | Status: SHIPPED | OUTPATIENT
Start: 2025-01-16 | End: 2026-01-16

## 2025-01-16 RX ORDER — HYDROCHLOROTHIAZIDE 25 MG/1
25 TABLET ORAL DAILY
Qty: 90 TABLET | Refills: 3 | Status: SHIPPED | OUTPATIENT
Start: 2025-01-16 | End: 2025-01-16 | Stop reason: SDUPTHER

## 2025-01-16 RX ORDER — LOSARTAN POTASSIUM 100 MG/1
100 TABLET ORAL DAILY
Qty: 90 TABLET | Refills: 3 | Status: SHIPPED | OUTPATIENT
Start: 2025-01-16 | End: 2026-01-16

## 2025-01-16 RX ORDER — LOSARTAN POTASSIUM 100 MG/1
100 TABLET ORAL DAILY
Qty: 90 TABLET | Refills: 3 | Status: SHIPPED | OUTPATIENT
Start: 2025-01-16 | End: 2025-01-16 | Stop reason: SDUPTHER

## 2025-01-16 RX ORDER — ROSUVASTATIN CALCIUM 40 MG/1
40 TABLET, COATED ORAL NIGHTLY
Qty: 90 TABLET | Refills: 3 | Status: SHIPPED | OUTPATIENT
Start: 2025-01-16 | End: 2026-01-16

## 2025-01-16 ASSESSMENT — ACTIVITIES OF DAILY LIVING (ADL)
MANAGING_FINANCES: TOTAL CARE
DOING_HOUSEWORK: INDEPENDENT
DRESSING: INDEPENDENT
GROCERY_SHOPPING: NEEDS ASSISTANCE
BATHING: INDEPENDENT
TAKING_MEDICATION: TOTAL CARE

## 2025-01-21 ENCOUNTER — APPOINTMENT (OUTPATIENT)
Dept: HEMATOLOGY/ONCOLOGY | Facility: CLINIC | Age: 80
End: 2025-01-21
Payer: MEDICARE

## 2025-02-27 ENCOUNTER — OFFICE VISIT (OUTPATIENT)
Dept: HEMATOLOGY/ONCOLOGY | Facility: CLINIC | Age: 80
End: 2025-02-27
Payer: MEDICARE

## 2025-02-27 VITALS
DIASTOLIC BLOOD PRESSURE: 55 MMHG | TEMPERATURE: 98.1 F | WEIGHT: 220.24 LBS | HEART RATE: 55 BPM | OXYGEN SATURATION: 99 % | SYSTOLIC BLOOD PRESSURE: 110 MMHG | BODY MASS INDEX: 31.6 KG/M2 | RESPIRATION RATE: 16 BRPM

## 2025-02-27 DIAGNOSIS — D72.829 LEUKOCYTOSIS, UNSPECIFIED TYPE: ICD-10-CM

## 2025-02-27 DIAGNOSIS — D72.820 LYMPHOCYTOSIS: ICD-10-CM

## 2025-02-27 DIAGNOSIS — D72.820 MONOCLONAL B-CELL LYMPHOCYTOSIS: ICD-10-CM

## 2025-02-27 DIAGNOSIS — C91.10 CLL (CHRONIC LYMPHOCYTIC LEUKEMIA) (MULTI): ICD-10-CM

## 2025-02-27 LAB
BASOPHILS # BLD AUTO: 0.06 X10*3/UL (ref 0–0.1)
BASOPHILS NFR BLD AUTO: 0.4 %
EOSINOPHIL # BLD AUTO: 0.38 X10*3/UL (ref 0–0.4)
EOSINOPHIL NFR BLD AUTO: 2.8 %
ERYTHROCYTE [DISTWIDTH] IN BLOOD BY AUTOMATED COUNT: 14.6 % (ref 11.5–14.5)
HCT VFR BLD AUTO: 35.8 % (ref 41–52)
HGB BLD-MCNC: 11.7 G/DL (ref 13.5–17.5)
IMM GRANULOCYTES # BLD AUTO: 0.03 X10*3/UL (ref 0–0.5)
IMM GRANULOCYTES NFR BLD AUTO: 0.2 % (ref 0–0.9)
LYMPHOCYTES # BLD AUTO: 6.49 X10*3/UL (ref 0.8–3)
LYMPHOCYTES NFR BLD AUTO: 48.1 %
MCH RBC QN AUTO: 28.1 PG (ref 26–34)
MCHC RBC AUTO-ENTMCNC: 32.7 G/DL (ref 32–36)
MCV RBC AUTO: 86 FL (ref 80–100)
MONOCYTES # BLD AUTO: 0.75 X10*3/UL (ref 0.05–0.8)
MONOCYTES NFR BLD AUTO: 5.6 %
NEUTROPHILS # BLD AUTO: 5.78 X10*3/UL (ref 1.6–5.5)
NEUTROPHILS NFR BLD AUTO: 42.9 %
PLATELET # BLD AUTO: 88 X10*3/UL (ref 150–450)
RBC # BLD AUTO: 4.17 X10*6/UL (ref 4.5–5.9)
WBC # BLD AUTO: 13.5 X10*3/UL (ref 4.4–11.3)

## 2025-02-27 PROCEDURE — 1159F MED LIST DOCD IN RCRD: CPT | Performed by: INTERNAL MEDICINE

## 2025-02-27 PROCEDURE — 36415 COLL VENOUS BLD VENIPUNCTURE: CPT | Performed by: INTERNAL MEDICINE

## 2025-02-27 PROCEDURE — 3074F SYST BP LT 130 MM HG: CPT | Performed by: INTERNAL MEDICINE

## 2025-02-27 PROCEDURE — 99214 OFFICE O/P EST MOD 30 MIN: CPT | Performed by: INTERNAL MEDICINE

## 2025-02-27 PROCEDURE — 85025 COMPLETE CBC W/AUTO DIFF WBC: CPT | Performed by: INTERNAL MEDICINE

## 2025-02-27 PROCEDURE — 1160F RVW MEDS BY RX/DR IN RCRD: CPT | Performed by: INTERNAL MEDICINE

## 2025-02-27 PROCEDURE — 1123F ACP DISCUSS/DSCN MKR DOCD: CPT | Performed by: INTERNAL MEDICINE

## 2025-02-27 PROCEDURE — 1126F AMNT PAIN NOTED NONE PRSNT: CPT | Performed by: INTERNAL MEDICINE

## 2025-02-27 PROCEDURE — 1036F TOBACCO NON-USER: CPT | Performed by: INTERNAL MEDICINE

## 2025-02-27 PROCEDURE — 3078F DIAST BP <80 MM HG: CPT | Performed by: INTERNAL MEDICINE

## 2025-02-27 ASSESSMENT — PAIN SCALES - GENERAL: PAINLEVEL_OUTOF10: 0-NO PAIN

## 2025-02-27 NOTE — PATIENT INSTRUCTIONS
Follow up visit for history of CLL and IgM monoclonal gammopathy     Return for routine follow up with Dr. Johnson in 6 months.

## 2025-02-27 NOTE — PROGRESS NOTES
Michael Dougherty  Male, 79 y.o., 1945  MRN: [id number]      Michael Dougherty is a 79 y.o. male evaluated for leukocytosis.  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  Normocytic anemia    3.  Chronic thrombocytopenia    4.  IgM monoclonal gammopathy    Interval history  2/27/25  Patient was evaluated for lymphocytosis.  Flow cytometry did show CD5 positive lymphoproliferative disorder.     Serum protei follow-up visit no B symptoms overall doing very well.      Patient is asymptomatic            Subjective , history of present illness  Patient is a 71-year-old gentleman with a history of coronary artery disease, diabetes mellitus, hypertension, hyperlipidemia, osteoarthritis, obesity and CBC done on #14 2023 did show WBC: 13.3, hemoglobin 10.6, platelets 134.  68% neutrophils and 26% lymphocyte.  Absolute lymphocyte count was 3.45    Medical record reviewed  8/16/23 , WBC count 13.5, hemoglobin 11.9, platelets 127, 49% neutrophil, 43% lymphocyte, ALC 5.82    May 27, 2023, WBC count 11.3, hemoglobin 11.3, platelets 108, 48% lymphocyte, ALC 5.41    January 28, 2023, WBC count 15.8, hemoglobin 12.5, platelets 137, neutrophils 32%, lymphocyte 57%, ALC 9.15.    Patient also has stage III chronic renal insufficiency creatinine between 1.35-1.5.    ROS  No fever, no night sweats, questionable history of weight loss patient is active, no chest pain and shortness of breath, no sinus problem, no sore throat, no nausea vomiting, no abdominal pain, no diarrhea and constipation, chronic knee pain due to osteoarthritis    No past medical history on file.   Past Surgical History:   Procedure Laterality Date    HAND SURGERY  12/04/2014    Hand Surgery                                                                                                                                                          OTHER SURGICAL  HISTORY  06/10/2021    Carpal tunnel surgery    OTHER SURGICAL HISTORY  06/10/2021    Trigger finger repair    OTHER SURGICAL HISTORY  06/10/2021    Wrist surgery    OTHER SURGICAL HISTORY  06/10/2021    Cataract surgery    OTHER SURGICAL HISTORY  06/10/2021    Foot surgery    OTHER SURGICAL HISTORY  06/10/2021    Knee replacement        Family History   Problem Relation Name Age of Onset    Diabetes Mother      Hypertension Father      Diabetes Father      Coronary artery disease Sister      Diabetes Brother      Melanoma Brother        Social History     Tobacco Use   Smoking Status Former    Current packs/day: 1.00    Types: Cigarettes   Smokeless Tobacco Never        Current Outpatient Medications:     aspirin 81 mg EC tablet, Take 1 tablet (81 mg) by mouth once daily., Disp: , Rfl:     B complex-vitamin C-folic acid (Nephro-Anneliese) 0.8 mg tablet, Take 1 tablet by mouth once daily., Disp: , Rfl:     bisoprolol (Zebeta) 5 mg tablet, Take 1 tablet (5 mg) by mouth once daily., Disp: 90 tablet, Rfl: 3    brimonidine (AlphaGAN P) 0.2 % ophthalmic solution, Administer 1 drop into both eyes 2 times a day., Disp: , Rfl:     donepezil (Aricept) 5 mg tablet, Take 1 tablet (5 mg) by mouth once daily at bedtime., Disp: 30 tablet, Rfl: 11    fluticasone (Flonase) 50 mcg/actuation nasal spray, Administer 2 sprays into each nostril once daily. Shake gently. Before first use, prime pump. After use, clean tip and replace cap., Disp: 16 g, Rfl: 5    FreeStyle Lamine reader (FreeStyle Lamine 2 [place]) misc, Use as instructed, Disp: 1 each, Rfl: 0    FreeStyle Lamine sensor system (FreeStyle Lamine 2 Sensor) kit, Use as instructed, every 2 weeks, Disp: 1 each, Rfl: 0    hydroCHLOROthiazide (HYDRODiuril) 25 mg tablet, Take 1 tablet (25 mg) by mouth once daily., Disp: 90 tablet, Rfl: 3    insulin glargine (Basaglar KwikPen U-100 Insulin) 100 unit/mL (3 mL) pen, Inject 35 Units under the skin once daily at bedtime. Take as directed per  "insulin instructions., Disp: 31.5 mL, Rfl: 3    losartan (Cozaar) 100 mg tablet, Take 1 tablet (100 mg) by mouth once daily., Disp: 90 tablet, Rfl: 3    NovoLOG FlexPen U-100 Insulin 100 unit/mL (3 mL) pen, Inject 35 Units under the skin 3 times a day with meals. Take as directed per insulin instructions., Disp: 94.5 mL, Rfl: 3    OneTouch Ultra Test strip, USE 1 TEST STRIP 3 TIMES A DAY, Disp: 300 strip, Rfl: 3    pen needle, diabetic 32 gauge x 5/32\" needle, To be used TID, Disp: 300 each, Rfl: 3    rosuvastatin (Crestor) 40 mg tablet, Take 1 tablet (40 mg) by mouth once daily at bedtime., Disp: 90 tablet, Rfl: 3    timolol (Timoptic) 0.5 % ophthalmic solution, Administer 1 drop into both eyes 2 times a day., Disp: , Rfl:       Last Recorded Vitals  There were no vitals taken for this visit.    Physical Exam  Vitals reviewed.   Constitutional:       Appearance: Normal appearance.   HENT:      Head: Normocephalic and atraumatic.      Nose: Nose normal.   Eyes:      Extraocular Movements: Extraocular movements intact.      Pupils: Pupils are equal, round, and reactive to light.   Neck:      Comments: No carotid bruit, no cervical lymphadenopathy  Cardiovascular:      Rate and Rhythm: Normal rate and regular rhythm.   Pulmonary:      Effort: Pulmonary effort is normal.      Breath sounds: Normal breath sounds.   Abdominal:      General: Abdomen is flat. Bowel sounds are normal.      Palpations: Abdomen is soft.      Comments: No hepatosplenomegaly, normotonic bowel sounds   Musculoskeletal:         General: Normal range of motion.      Cervical back: Normal range of motion and neck supple.   Lymphadenopathy:      Comments: No peripheral lymphadenopathy   Skin:     General: Skin is warm and dry.   Neurological:      General: No focal deficit present.      Mental Status: He is alert and oriented to person, place, and time.        Labs       WBC count 13.4, hemoglobin 11.7, platelets 88,000, ALC 6.4   Flow " cytometry  Immunophenotypic findings consistent with CD5 positive clonal B cell population, see note.      No abnormal T cell population identified.      Note: The cells are CD5 positive and mostly have a characteristic chronic lymphocytic leukemia/small lymphocytic lymphoma phenotype but with some atypia (such as lack of CD43 expression). The frequency of the clonal population (less than 5000 cells per uL is also insufficient to diagnose CLL.  Correlation with clinical, morphologic and genetic findings will be helpful in establishing a diagnosis.      Electronically signed by Espinoza Donaldson MD PhD on 12/20/2023 at 0922        By the signature on this report, the individual or group listed as making the Final Interpretation/Diagnosis certifies that they have reviewed this case and the staining reactivity of the antibodies and reagents in the analysis were determined to be acceptable. Diagnostic interpretation performed at [facility]   Cell Populations    Abnormal Cell Population: Lymphocytes     Percentage:  27 %         Phenotype   Marker Interpretation      CD1c Partial   CD5 Positive Dim-moderate   CD10 Negative   CD11c Dim-Negative   CD13 Negative   CD19 Positive moderate   CD20 Positive moderate   CD23 Positive dim-moderate   CD25 Negative   CD38 Positive   CD43 Negative   CD45 Positive moderate   CD56 Negative   CD79b Positive dim    Dim    Positive moderate   HLA-DR Positive moderate      IgD Positive dim   Kappa Negative   Lambda Negative                Flow Differential    Lymphocyte: 39 %                  CD3+CD4+: 10 % ;                                         CD3+CD8+: 13 % ;                                         Natural Killer Cells: 2 %                               CD19+: 75 %                  Monocyte: 5 %       Granulocyte: 52 %           Flow Test Ordered  not established Low Grade Panel   Specimen Viability  not established Acceptable   Cell Count  not established x10*3/uL  9.40   Number of Cells Collected  not established per tube 100,000.00   Methodology             Assessment/Plan   #1  CD5 positive lymphoproliferative disorder, 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  Absolute lymphocyte count was 9.15 in January 2023 and in August 2023, ALC was 5.82.  Recent CBC done in November 2023, WBC count was 13.3 and ALC 3.45.  Patient does have relative lymphocytosis which is fluctuating, needs to rule out monoclonal lymphocytosis versus chronic lymphocytic leukemia.    2.  Normocytic anemia initial workup was negative could be due to underlying renal insufficiency continue to monitor CBC    3.  Chronic thrombocytopenia could be ITP.    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  Patient has good prognosis, patient does not need any treatment.  Possible complication associated with the CLL including frequent infection, history of hemolytic anemia, ITP, reactive transformation discussed with patient briefly.  I will continue to monitor clinically.  CBC will be repeated every 6 months.    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  Chronic thrombocytopenia possible chronic ITP    3.  IgM monoclonal gammopathy    Clinically stable CBC stable, discussed with patient  Patient advised if you are feeling weak, fatigue noticed to have fever or lymphadenopathy, call our office, reevaluation after 6-month  Stormy Johnson MD

## 2025-03-10 ENCOUNTER — OFFICE VISIT (OUTPATIENT)
Dept: PRIMARY CARE | Facility: CLINIC | Age: 80
End: 2025-03-10
Payer: MEDICARE

## 2025-03-10 VITALS
OXYGEN SATURATION: 99 % | TEMPERATURE: 97 F | DIASTOLIC BLOOD PRESSURE: 82 MMHG | SYSTOLIC BLOOD PRESSURE: 126 MMHG | HEART RATE: 77 BPM

## 2025-03-10 DIAGNOSIS — J11.1 INFLUENZA: Primary | ICD-10-CM

## 2025-03-10 DIAGNOSIS — J18.9 PNEUMONIA OF LEFT LOWER LOBE DUE TO INFECTIOUS ORGANISM: ICD-10-CM

## 2025-03-10 PROCEDURE — 99213 OFFICE O/P EST LOW 20 MIN: CPT | Performed by: FAMILY MEDICINE

## 2025-03-10 PROCEDURE — 1036F TOBACCO NON-USER: CPT | Performed by: FAMILY MEDICINE

## 2025-03-10 PROCEDURE — 3079F DIAST BP 80-89 MM HG: CPT | Performed by: FAMILY MEDICINE

## 2025-03-10 PROCEDURE — 1159F MED LIST DOCD IN RCRD: CPT | Performed by: FAMILY MEDICINE

## 2025-03-10 PROCEDURE — G2211 COMPLEX E/M VISIT ADD ON: HCPCS | Performed by: FAMILY MEDICINE

## 2025-03-10 PROCEDURE — 1123F ACP DISCUSS/DSCN MKR DOCD: CPT | Performed by: FAMILY MEDICINE

## 2025-03-10 PROCEDURE — 3074F SYST BP LT 130 MM HG: CPT | Performed by: FAMILY MEDICINE

## 2025-03-10 RX ORDER — AMOXICILLIN AND CLAVULANATE POTASSIUM 875; 125 MG/1; MG/1
875 TABLET, FILM COATED ORAL 2 TIMES DAILY
Qty: 20 TABLET | Refills: 0 | Status: SHIPPED | OUTPATIENT
Start: 2025-03-10 | End: 2025-03-20

## 2025-03-10 ASSESSMENT — ENCOUNTER SYMPTOMS
COUGH: 1
FEVER: 1

## 2025-03-10 NOTE — PROGRESS NOTES
Subjective   Patient ID: Michael Dougherty is a 79 y.o. male who presents for Fever, Cough, and Nasal Congestion (And drainage, yellow mucus x 3 days).  Fever   Associated symptoms include coughing.   Cough  Associated symptoms include a fever.     cough, fatigue, low grade fevers and drainage x 3 days.  Body aches.  Feeling more fatigued and winded.  Pt's wife is sick too.    Patient Active Problem List   Diagnosis    Adverse effect of calcium-channel blocker    Atherosclerosis of aorta (CMS-HCC)    Cataracts, bilateral    Chondrocalcinosis of right knee    Coronary arteriosclerosis    Diabetes mellitus (Multi)    Gait abnormality    Heart murmur    Essential hypertension    Hyperglycemia due to type 2 diabetes mellitus (Multi)    Hyperlipidemia    Lesion of ulnar nerve, left upper limb    Localized primary osteoarthritis of carpometacarpal (CMC) joint of left wrist    Low back pain    Morbid obesity (Multi)    Nonadherence with dietary restriction    Stage 3a chronic kidney disease (Multi)    Sensorineural hearing loss (SNHL) of both ears    Anemia    Severe aortic valve stenosis    Cardiovascular stress test abnormal       Social Connections: Not on file       Current Outpatient Medications on File Prior to Visit   Medication Sig Dispense Refill    aspirin 81 mg EC tablet Take 1 tablet (81 mg) by mouth once daily.      B complex-vitamin C-folic acid (Nephro-Anneliese) 0.8 mg tablet Take 1 tablet by mouth once daily.      bisoprolol (Zebeta) 5 mg tablet Take 1 tablet (5 mg) by mouth once daily. 90 tablet 3    brimonidine (AlphaGAN P) 0.2 % ophthalmic solution Administer 1 drop into both eyes 2 times a day.      donepezil (Aricept) 5 mg tablet Take 1 tablet (5 mg) by mouth once daily at bedtime. 90 tablet 3    hydroCHLOROthiazide (HYDRODiuril) 25 mg tablet Take 1 tablet (25 mg) by mouth once daily. 90 tablet 3    insulin aspart, with niacinamide, (Fiasp FlexTouch U-100 Insulin) 100 unit/mL (3 mL) injection Inject 35 Units  "under the skin 3 times daily (morning, midday, late afternoon). Take as directed per insulin instructions. 94.5 mL 3    insulin degludec (Tresiba FlexTouch U-100) 100 unit/mL (3 mL) injection Inject 65 Units under the skin once daily at bedtime. Take as directed per insulin instructions. 58.5 mL 3    losartan (Cozaar) 100 mg tablet Take 1 tablet (100 mg) by mouth once daily. 90 tablet 3    OneTouch Ultra Test strip USE 1 TEST STRIP 3 TIMES A  strip 3    pen needle, diabetic 31 gauge x 5/16\" needle Use to inject 1-4 times daily as directed. 300 each 3    rosuvastatin (Crestor) 40 mg tablet Take 1 tablet (40 mg) by mouth once daily at bedtime. 90 tablet 3    timolol (Timoptic) 0.5 % ophthalmic solution Administer 1 drop into both eyes 2 times a day.       No current facility-administered medications on file prior to visit.        Vitals:    03/10/25 1026   BP: 126/82   Pulse: 77   Temp: 36.1 °C (97 °F)   SpO2: 99%     There were no vitals filed for this visit.    Review of Systems   Constitutional:  Positive for fever.   Respiratory:  Positive for cough.    All other systems reviewed and are negative.      Objective     Physical Exam  Constitutional:       Appearance: Normal appearance. He is well-developed.   HENT:      Head: Atraumatic.   Cardiovascular:      Rate and Rhythm: Normal rate and regular rhythm.      Heart sounds: Normal heart sounds. No murmur heard.  Pulmonary:      Effort: Pulmonary effort is normal.      Breath sounds: Rhonchi present.   Abdominal:      General: Bowel sounds are normal.      Palpations: Abdomen is soft.   Skin:     General: Skin is warm.   Neurological:      General: No focal deficit present.      Mental Status: He is alert.   Psychiatric:         Mood and Affect: Mood normal.         Office Visit on 02/27/2025   Component Date Value Ref Range Status    WBC 02/27/2025 13.5 (H)  4.4 - 11.3 x10*3/uL Final    RBC 02/27/2025 4.17 (L)  4.50 - 5.90 x10*6/uL Final    Hemoglobin " 02/27/2025 11.7 (L)  13.5 - 17.5 g/dL Final    Hematocrit 02/27/2025 35.8 (L)  41.0 - 52.0 % Final    MCV 02/27/2025 86  80 - 100 fL Final    MCH 02/27/2025 28.1  26.0 - 34.0 pg Final    MCHC 02/27/2025 32.7  32.0 - 36.0 g/dL Final    RDW 02/27/2025 14.6 (H)  11.5 - 14.5 % Final    Platelets 02/27/2025 88 (L)  150 - 450 x10*3/uL Final    Platelet count verified by smear review.    Neutrophils % 02/27/2025 42.9  40.0 - 80.0 % Final    Immature Granulocytes %, Automated 02/27/2025 0.2  0.0 - 0.9 % Final    Immature Granulocyte Count (IG) includes promyelocytes, myelocytes and metamyelocytes but does not include bands. Percent differential counts (%) should be interpreted in the context of the absolute cell counts (cells/UL).    Lymphocytes % 02/27/2025 48.1  13.0 - 44.0 % Final    Monocytes % 02/27/2025 5.6  2.0 - 10.0 % Final    Eosinophils % 02/27/2025 2.8  0.0 - 6.0 % Final    Basophils % 02/27/2025 0.4  0.0 - 2.0 % Final    Neutrophils Absolute 02/27/2025 5.78 (H)  1.60 - 5.50 x10*3/uL Final    Percent differential counts (%) should be interpreted in the context of the absolute cell counts (cells/uL).    Immature Granulocytes Absolute, Au* 02/27/2025 0.03  0.00 - 0.50 x10*3/uL Final    Lymphocytes Absolute 02/27/2025 6.49 (H)  0.80 - 3.00 x10*3/uL Final    Monocytes Absolute 02/27/2025 0.75  0.05 - 0.80 x10*3/uL Final    Eosinophils Absolute 02/27/2025 0.38  0.00 - 0.40 x10*3/uL Final    Basophils Absolute 02/27/2025 0.06  0.00 - 0.10 x10*3/uL Final   Office Visit on 01/16/2025   Component Date Value Ref Range Status    POC Color, Urine 01/16/2025 Yellow  Straw, Yellow, Light-Yellow Final    POC Appearance, Urine 01/16/2025 Clear  Clear Final    POC Glucose, Urine 01/16/2025 NEGATIVE  NEGATIVE mg/dl Final    POC Bilirubin, Urine 01/16/2025 NEGATIVE  NEGATIVE Final    POC Ketones, Urine 01/16/2025 NEGATIVE  NEGATIVE mg/dl Final    POC Specific Gravity, Urine 01/16/2025 1.020  1.005 - 1.035 Final    POC Blood, Urine  01/16/2025 NEGATIVE  NEGATIVE Final    POC PH, Urine 01/16/2025 7.0  No Reference Range Established PH Final    POC Protein, Urine 01/16/2025 NEGATIVE  NEGATIVE mg/dl Final    POC Urobilinogen, Urine 01/16/2025 1.0  0.2, 1.0 EU/DL Final    Poc Nitrite, Urine 01/16/2025 NEGATIVE  NEGATIVE Final    POC Leukocytes, Urine 01/16/2025 NEGATIVE  NEGATIVE Final    POC Urine Albumin 01/16/2025 10  No Reference Range Established mg/L Final    POC Urine Creatinine 01/16/2025 200  No Reference Range Established mg/dl Final    POC ALBUMIN /CREATININE RATIO MANU* 01/16/2025 <30  <30 ug/mg Creat Final       Assessment/Plan   Problem List Items Addressed This Visit    None  Visit Diagnoses         Codes    Influenza    -  Primary J11.1    Relevant Orders    Influenza A, and B PCR    Sars-CoV-2 PCR    Pneumonia of left lower lobe due to infectious organism     J18.9    Relevant Medications    amoxicillin-pot clavulanate (Augmentin) 875-125 mg tablet          Possible flu.  Checking flu/COVID.  Augmentin to cover pneumonia.

## 2025-03-11 ENCOUNTER — TELEPHONE (OUTPATIENT)
Dept: PRIMARY CARE | Facility: CLINIC | Age: 80
End: 2025-03-11
Payer: MEDICARE

## 2025-03-11 DIAGNOSIS — U07.1 COVID: Primary | ICD-10-CM

## 2025-03-11 LAB
FLUAV RNA RESP QL NAA+PROBE: NOT DETECTED
FLUBV RNA RESP QL NAA+PROBE: NOT DETECTED
SARS-COV-2 RNA RESP QL NAA+PROBE: DETECTED

## 2025-03-11 NOTE — PROGRESS NOTES
Subjective   Patient ID: Michael Dougherty is a 79 y.o. male who presents for No chief complaint on file..  HPI    Patient Active Problem List   Diagnosis    Adverse effect of calcium-channel blocker    Atherosclerosis of aorta (CMS-HCC)    Cataracts, bilateral    Chondrocalcinosis of right knee    Coronary arteriosclerosis    Diabetes mellitus (Multi)    Gait abnormality    Heart murmur    Essential hypertension    Hyperglycemia due to type 2 diabetes mellitus (Multi)    Hyperlipidemia    Lesion of ulnar nerve, left upper limb    Localized primary osteoarthritis of carpometacarpal (CMC) joint of left wrist    Low back pain    Morbid obesity (Multi)    Nonadherence with dietary restriction    Stage 3a chronic kidney disease (Multi)    Sensorineural hearing loss (SNHL) of both ears    Anemia    Severe aortic valve stenosis    Cardiovascular stress test abnormal       Social Connections: Not on file       Current Outpatient Medications on File Prior to Visit   Medication Sig Dispense Refill    amoxicillin-pot clavulanate (Augmentin) 875-125 mg tablet Take 1 tablet (875 mg) by mouth 2 times a day for 10 days. 20 tablet 0    aspirin 81 mg EC tablet Take 1 tablet (81 mg) by mouth once daily.      B complex-vitamin C-folic acid (Nephro-Anneliese) 0.8 mg tablet Take 1 tablet by mouth once daily.      bisoprolol (Zebeta) 5 mg tablet Take 1 tablet (5 mg) by mouth once daily. 90 tablet 3    brimonidine (AlphaGAN P) 0.2 % ophthalmic solution Administer 1 drop into both eyes 2 times a day.      donepezil (Aricept) 5 mg tablet Take 1 tablet (5 mg) by mouth once daily at bedtime. 90 tablet 3    hydroCHLOROthiazide (HYDRODiuril) 25 mg tablet Take 1 tablet (25 mg) by mouth once daily. 90 tablet 3    insulin aspart, with niacinamide, (Fiasp FlexTouch U-100 Insulin) 100 unit/mL (3 mL) injection Inject 35 Units under the skin 3 times daily (morning, midday, late afternoon). Take as directed per insulin instructions. 94.5 mL 3    insulin  "degludec (Tresiba FlexTouch U-100) 100 unit/mL (3 mL) injection Inject 65 Units under the skin once daily at bedtime. Take as directed per insulin instructions. 58.5 mL 3    losartan (Cozaar) 100 mg tablet Take 1 tablet (100 mg) by mouth once daily. 90 tablet 3    OneTouch Ultra Test strip USE 1 TEST STRIP 3 TIMES A  strip 3    pen needle, diabetic 31 gauge x 5/16\" needle Use to inject 1-4 times daily as directed. 300 each 3    rosuvastatin (Crestor) 40 mg tablet Take 1 tablet (40 mg) by mouth once daily at bedtime. 90 tablet 3    timolol (Timoptic) 0.5 % ophthalmic solution Administer 1 drop into both eyes 2 times a day.       No current facility-administered medications on file prior to visit.        There were no vitals filed for this visit.  There were no vitals filed for this visit.    Review of Systems    Objective     Physical Exam    Office Visit on 03/10/2025   Component Date Value Ref Range Status    FLU A 03/10/2025 NOT DETECTED  NOT DETECTED Final    FLU B 03/10/2025 NOT DETECTED  NOT DETECTED Final    SARS CoV 2 RNA 03/10/2025 DETECTED (A)  NOT DETECTED Final    Comment:    A Detected result is considered a positive test result  for COVID-19.  This indicates that RNA from SARS-CoV-2  (formerly 2019-nCoV) was detected, and the patient is  infected with the virus and presumed to be contagious.  If requested by public health authority, specimen will  be sent for additional testing.     Please review the \"Fact Sheets\" and FDA authorized  labeling available for health care providers and  patients using the following websites:  [email or web address]/[email or web address]/[email or web address]/  rc-sars-cov2-fact-sheet.html  [email or web address]://[email or web address]/home/Covid-19/Patients/  rc-sars-cov2-fact-sheet.html      Please note: If a not detected (negative) Influenza A or  Influenza B is obtained, this means viral RNA was not  present in the specimen above the limit of detection. A  negative result does not rule out " the possibility of  influenza and should not be used as the sole basis for  treatment or patient management decisions.  If Influenza  is still suspected, based o                           n exposure history together  with other clinical findings, re-testing should be  considered.     This test has been authorized by the FDA under  an Emergency Use Authorization (EUA) for use by authorized  laboratories.      Methodology:  Nucleic Acid Amplification Test (NAAT)  includes RT-PCR or TMA      Additional information about COVID-19 can be found  at the [employer] website:  [email or web address].     For patients with a Detected or Inconclusive test  result, please see CDC's COVID-19 Treatments and   Medications page located at   https://www.cdc.gov/coronavirus/2019-ncov/your-health/  treatments-for-severe-illness.html for information on   COVID-19 therapeutics.     For patients with a Not Detected test result, please   see CDC's Vaccines for COVID-19 page located at  https://www.cdc.gov/coronavirus/2019-ncov/vaccines/  index.html for information on COVID-19 vaccines.     Office Visit on 02/27/2025   Component Date Value Ref Range Status    WBC 02/27/2025 13.5 (H)  4.4 - 11.3 x10*3/uL Final    RBC 02/27/2025 4.17 (L)  4.50 - 5.90 x10*6/uL Final    Hemoglobin 02/27/2025 11.7 (L)  13.5 - 17.5 g/dL Final    Hematocrit 02/27/2025 35.8 (L)  41.0 - 52.0 % Final    MCV 02/27/2025 86  80 - 100 fL Final    MCH 02/27/2025 28.1  26.0 - 34.0 pg Final    MCHC 02/27/2025 32.7  32.0 - 36.0 g/dL Final    RDW 02/27/2025 14.6 (H)  11.5 - 14.5 % Final    Platelets 02/27/2025 88 (L)  150 - 450 x10*3/uL Final    Platelet count verified by smear review.    Neutrophils % 02/27/2025 42.9  40.0 - 80.0 % Final    Immature Granulocytes %, Automated 02/27/2025 0.2  0.0 - 0.9 % Final    Immature Granulocyte Count (IG) includes promyelocytes, myelocytes and metamyelocytes but does not include bands. Percent differential counts (%)  should be interpreted in the context of the absolute cell counts (cells/UL).    Lymphocytes % 02/27/2025 48.1  13.0 - 44.0 % Final    Monocytes % 02/27/2025 5.6  2.0 - 10.0 % Final    Eosinophils % 02/27/2025 2.8  0.0 - 6.0 % Final    Basophils % 02/27/2025 0.4  0.0 - 2.0 % Final    Neutrophils Absolute 02/27/2025 5.78 (H)  1.60 - 5.50 x10*3/uL Final    Percent differential counts (%) should be interpreted in the context of the absolute cell counts (cells/uL).    Immature Granulocytes Absolute, Au* 02/27/2025 0.03  0.00 - 0.50 x10*3/uL Final    Lymphocytes Absolute 02/27/2025 6.49 (H)  0.80 - 3.00 x10*3/uL Final    Monocytes Absolute 02/27/2025 0.75  0.05 - 0.80 x10*3/uL Final    Eosinophils Absolute 02/27/2025 0.38  0.00 - 0.40 x10*3/uL Final    Basophils Absolute 02/27/2025 0.06  0.00 - 0.10 x10*3/uL Final   Office Visit on 01/16/2025   Component Date Value Ref Range Status    POC Color, Urine 01/16/2025 Yellow  Straw, Yellow, Light-Yellow Final    POC Appearance, Urine 01/16/2025 Clear  Clear Final    POC Glucose, Urine 01/16/2025 NEGATIVE  NEGATIVE mg/dl Final    POC Bilirubin, Urine 01/16/2025 NEGATIVE  NEGATIVE Final    POC Ketones, Urine 01/16/2025 NEGATIVE  NEGATIVE mg/dl Final    POC Specific Gravity, Urine 01/16/2025 1.020  1.005 - 1.035 Final    POC Blood, Urine 01/16/2025 NEGATIVE  NEGATIVE Final    POC PH, Urine 01/16/2025 7.0  No Reference Range Established PH Final    POC Protein, Urine 01/16/2025 NEGATIVE  NEGATIVE mg/dl Final    POC Urobilinogen, Urine 01/16/2025 1.0  0.2, 1.0 EU/DL Final    Poc Nitrite, Urine 01/16/2025 NEGATIVE  NEGATIVE Final    POC Leukocytes, Urine 01/16/2025 NEGATIVE  NEGATIVE Final    POC Urine Albumin 01/16/2025 10  No Reference Range Established mg/L Final    POC Urine Creatinine 01/16/2025 200  No Reference Range Established mg/dl Final    POC ALBUMIN /CREATININE RATIO MANU* 01/16/2025 <30  <30 ug/mg Creat Final       Assessment/Plan

## 2025-03-11 NOTE — TELEPHONE ENCOUNTER
----- Message from Ulices Chaudhary sent at 3/11/2025  8:26 AM EDT -----  Pts COVID test came back +, I will send in Paxlovid for him.  Hold his cholesterol medicine while on it.

## 2025-03-11 NOTE — TELEPHONE ENCOUNTER
----- Message from Ulices Chaudhary sent at 3/11/2025  8:28 AM EDT -----  If he is feeling ok it is also ok to just wait it out without the Paxlovid

## 2025-03-11 NOTE — RESULT ENCOUNTER NOTE
Pts COVID test came back +, I will send in Paxlovid for him.  Hold his cholesterol medicine while on it.

## 2025-03-17 ENCOUNTER — APPOINTMENT (OUTPATIENT)
Dept: ORTHOPEDIC SURGERY | Facility: HOSPITAL | Age: 80
End: 2025-03-17
Payer: MEDICARE

## 2025-03-25 ENCOUNTER — OFFICE VISIT (OUTPATIENT)
Dept: PRIMARY CARE | Facility: CLINIC | Age: 80
End: 2025-03-25
Payer: MEDICARE

## 2025-03-25 VITALS
OXYGEN SATURATION: 99 % | HEART RATE: 57 BPM | DIASTOLIC BLOOD PRESSURE: 80 MMHG | TEMPERATURE: 97.1 F | SYSTOLIC BLOOD PRESSURE: 128 MMHG

## 2025-03-25 DIAGNOSIS — J18.9 PNEUMONIA OF LEFT LOWER LOBE DUE TO INFECTIOUS ORGANISM: Primary | ICD-10-CM

## 2025-03-25 PROCEDURE — 1036F TOBACCO NON-USER: CPT | Performed by: FAMILY MEDICINE

## 2025-03-25 PROCEDURE — 3074F SYST BP LT 130 MM HG: CPT | Performed by: FAMILY MEDICINE

## 2025-03-25 PROCEDURE — 99213 OFFICE O/P EST LOW 20 MIN: CPT | Performed by: FAMILY MEDICINE

## 2025-03-25 PROCEDURE — 1159F MED LIST DOCD IN RCRD: CPT | Performed by: FAMILY MEDICINE

## 2025-03-25 PROCEDURE — 1123F ACP DISCUSS/DSCN MKR DOCD: CPT | Performed by: FAMILY MEDICINE

## 2025-03-25 PROCEDURE — 3079F DIAST BP 80-89 MM HG: CPT | Performed by: FAMILY MEDICINE

## 2025-03-25 PROCEDURE — G2211 COMPLEX E/M VISIT ADD ON: HCPCS | Performed by: FAMILY MEDICINE

## 2025-03-25 ASSESSMENT — ENCOUNTER SYMPTOMS: COUGH: 1

## 2025-03-25 NOTE — PROGRESS NOTES
"Subjective   Patient ID: Michael Dougherty is a 79 y.o. male who presents for Cough (Deep, \"crackling\" cough x 2 weeks).  Cough     patient is status posttreatment for his pneumonia.  He is feeling better though still having some coughing.  His wife worries about him because he he has rattling in his chest.  Denies any fevers or chills.  He is active.    Patient Active Problem List   Diagnosis    Adverse effect of calcium-channel blocker    Atherosclerosis of aorta (CMS-HCC)    Cataracts, bilateral    Chondrocalcinosis of right knee    Coronary arteriosclerosis    Diabetes mellitus (Multi)    Gait abnormality    Heart murmur    Essential hypertension    Hyperglycemia due to type 2 diabetes mellitus (Multi)    Hyperlipidemia    Lesion of ulnar nerve, left upper limb    Localized primary osteoarthritis of carpometacarpal (CMC) joint of left wrist    Low back pain    Morbid obesity (Multi)    Nonadherence with dietary restriction    Stage 3a chronic kidney disease (Multi)    Sensorineural hearing loss (SNHL) of both ears    Anemia    Severe aortic valve stenosis    Cardiovascular stress test abnormal       Social Connections: Not on file       Current Outpatient Medications on File Prior to Visit   Medication Sig Dispense Refill    aspirin 81 mg EC tablet Take 1 tablet (81 mg) by mouth once daily.      B complex-vitamin C-folic acid (Nephro-Anneliese) 0.8 mg tablet Take 1 tablet by mouth once daily.      bisoprolol (Zebeta) 5 mg tablet Take 1 tablet (5 mg) by mouth once daily. 90 tablet 3    brimonidine (AlphaGAN P) 0.2 % ophthalmic solution Administer 1 drop into both eyes 2 times a day.      donepezil (Aricept) 5 mg tablet Take 1 tablet (5 mg) by mouth once daily at bedtime. 90 tablet 3    hydroCHLOROthiazide (HYDRODiuril) 25 mg tablet Take 1 tablet (25 mg) by mouth once daily. 90 tablet 3    insulin aspart, with niacinamide, (Fiasp FlexTouch U-100 Insulin) 100 unit/mL (3 mL) injection Inject 35 Units under the skin 3 times " "daily (morning, midday, late afternoon). Take as directed per insulin instructions. 94.5 mL 3    insulin degludec (Tresiba FlexTouch U-100) 100 unit/mL (3 mL) injection Inject 65 Units under the skin once daily at bedtime. Take as directed per insulin instructions. 58.5 mL 3    losartan (Cozaar) 100 mg tablet Take 1 tablet (100 mg) by mouth once daily. 90 tablet 3    OneTouch Ultra Test strip USE 1 TEST STRIP 3 TIMES A  strip 3    pen needle, diabetic 31 gauge x 5/16\" needle Use to inject 1-4 times daily as directed. 300 each 3    rosuvastatin (Crestor) 40 mg tablet Take 1 tablet (40 mg) by mouth once daily at bedtime. 90 tablet 3    timolol (Timoptic) 0.5 % ophthalmic solution Administer 1 drop into both eyes 2 times a day.      [] amoxicillin-pot clavulanate (Augmentin) 875-125 mg tablet Take 1 tablet (875 mg) by mouth 2 times a day for 10 days. 20 tablet 0     No current facility-administered medications on file prior to visit.        Vitals:    25 0926   BP: 128/80   Pulse: 57   Temp: 36.2 °C (97.1 °F)   SpO2: 99%     There were no vitals filed for this visit.    Review of Systems   Respiratory:  Positive for cough.        Objective     Physical Exam  Constitutional:       Appearance: Normal appearance. He is well-developed.   HENT:      Head: Atraumatic.   Cardiovascular:      Rate and Rhythm: Normal rate and regular rhythm.      Heart sounds: Normal heart sounds. No murmur heard.  Pulmonary:      Effort: Pulmonary effort is normal.      Breath sounds: Normal breath sounds.   Abdominal:      General: Bowel sounds are normal.      Palpations: Abdomen is soft.   Skin:     General: Skin is warm.   Neurological:      General: No focal deficit present.      Mental Status: He is alert.   Psychiatric:         Mood and Affect: Mood normal.         Office Visit on 03/10/2025   Component Date Value Ref Range Status    FLU A 03/10/2025 NOT DETECTED  NOT DETECTED Final    FLU B 03/10/2025 NOT DETECTED  " "NOT DETECTED Final    SARS CoV 2 RNA 03/10/2025 DETECTED (A)  NOT DETECTED Final    Comment:    A Detected result is considered a positive test result  for COVID-19.  This indicates that RNA from SARS-CoV-2  (formerly 2019-nCoV) was detected, and the patient is  infected with the virus and presumed to be contagious.  If requested by public health authority, specimen will  be sent for additional testing.     Please review the \"Fact Sheets\" and FDA authorized  labeling available for health care providers and  patients using the following websites:  [email or web address]  rc-sars-cov2-fact-sheet.html  [email or web address]/home/[email or web address]/Patients/  rc-sars-cov2-fact-sheet.html      Please note: If a not detected (negative) Influenza A or  Influenza B is obtained, this means viral RNA was not  present in the specimen above the limit of detection. A  negative result does not rule out the possibility of  influenza and should not be used as the sole basis for  treatment or patient management decisions.  If Influenza  is still suspected, based o                           n exposure history together  with other clinical findings, re-testing should be  considered.     This test has been authorized by the FDA under  an Emergency Use Authorization (EUA) for use by authorized  laboratories.      Methodology:  Nucleic Acid Amplification Test (NAAT)  includes RT-PCR or TMA      Additional information about COVID-19 can be found  at the [employer] website:  [email or web address].     For patients with a Detected or Inconclusive test  result, please see CDC's COVID-19 Treatments and   Medications page located at   https://www.cdc.gov/coronavirus/2019-ncov/your-health/  treatments-for-severe-illness.html for information on   COVID-19 therapeutics.     For patients with a Not Detected test result, please   see CDC's Vaccines for COVID-19 page located " at  https://www.cdc.gov/coronavirus/2019-ncov/vaccines/  index.html for information on COVID-19 vaccines.     Office Visit on 02/27/2025   Component Date Value Ref Range Status    WBC 02/27/2025 13.5 (H)  4.4 - 11.3 x10*3/uL Final    RBC 02/27/2025 4.17 (L)  4.50 - 5.90 x10*6/uL Final    Hemoglobin 02/27/2025 11.7 (L)  13.5 - 17.5 g/dL Final    Hematocrit 02/27/2025 35.8 (L)  41.0 - 52.0 % Final    MCV 02/27/2025 86  80 - 100 fL Final    MCH 02/27/2025 28.1  26.0 - 34.0 pg Final    MCHC 02/27/2025 32.7  32.0 - 36.0 g/dL Final    RDW 02/27/2025 14.6 (H)  11.5 - 14.5 % Final    Platelets 02/27/2025 88 (L)  150 - 450 x10*3/uL Final    Platelet count verified by smear review.    Neutrophils % 02/27/2025 42.9  40.0 - 80.0 % Final    Immature Granulocytes %, Automated 02/27/2025 0.2  0.0 - 0.9 % Final    Immature Granulocyte Count (IG) includes promyelocytes, myelocytes and metamyelocytes but does not include bands. Percent differential counts (%) should be interpreted in the context of the absolute cell counts (cells/UL).    Lymphocytes % 02/27/2025 48.1  13.0 - 44.0 % Final    Monocytes % 02/27/2025 5.6  2.0 - 10.0 % Final    Eosinophils % 02/27/2025 2.8  0.0 - 6.0 % Final    Basophils % 02/27/2025 0.4  0.0 - 2.0 % Final    Neutrophils Absolute 02/27/2025 5.78 (H)  1.60 - 5.50 x10*3/uL Final    Percent differential counts (%) should be interpreted in the context of the absolute cell counts (cells/uL).    Immature Granulocytes Absolute, Au* 02/27/2025 0.03  0.00 - 0.50 x10*3/uL Final    Lymphocytes Absolute 02/27/2025 6.49 (H)  0.80 - 3.00 x10*3/uL Final    Monocytes Absolute 02/27/2025 0.75  0.05 - 0.80 x10*3/uL Final    Eosinophils Absolute 02/27/2025 0.38  0.00 - 0.40 x10*3/uL Final    Basophils Absolute 02/27/2025 0.06  0.00 - 0.10 x10*3/uL Final       Assessment/Plan   Problem List Items Addressed This Visit    None  Visit Diagnoses         Codes    Pneumonia of left lower lobe due to infectious organism    -   Primary J18.9          Resolving.  Continue staying active.

## 2025-03-31 ENCOUNTER — OFFICE VISIT (OUTPATIENT)
Dept: ORTHOPEDIC SURGERY | Facility: HOSPITAL | Age: 80
End: 2025-03-31
Payer: MEDICARE

## 2025-03-31 VITALS — BODY MASS INDEX: 31.53 KG/M2 | HEIGHT: 70 IN | WEIGHT: 220.24 LBS

## 2025-03-31 DIAGNOSIS — M19.032 LOCALIZED PRIMARY OSTEOARTHRITIS OF CARPOMETACARPAL (CMC) JOINT OF LEFT WRIST: Primary | ICD-10-CM

## 2025-03-31 PROCEDURE — 99214 OFFICE O/P EST MOD 30 MIN: CPT | Performed by: ORTHOPAEDIC SURGERY

## 2025-03-31 PROCEDURE — 1036F TOBACCO NON-USER: CPT | Performed by: ORTHOPAEDIC SURGERY

## 2025-03-31 PROCEDURE — 1160F RVW MEDS BY RX/DR IN RCRD: CPT | Performed by: ORTHOPAEDIC SURGERY

## 2025-03-31 PROCEDURE — 20600 DRAIN/INJ JOINT/BURSA W/O US: CPT | Mod: LT | Performed by: ORTHOPAEDIC SURGERY

## 2025-03-31 PROCEDURE — 1123F ACP DISCUSS/DSCN MKR DOCD: CPT | Performed by: ORTHOPAEDIC SURGERY

## 2025-03-31 PROCEDURE — 1125F AMNT PAIN NOTED PAIN PRSNT: CPT | Performed by: ORTHOPAEDIC SURGERY

## 2025-03-31 PROCEDURE — 2500000004 HC RX 250 GENERAL PHARMACY W/ HCPCS (ALT 636 FOR OP/ED): Performed by: ORTHOPAEDIC SURGERY

## 2025-03-31 PROCEDURE — 1159F MED LIST DOCD IN RCRD: CPT | Performed by: ORTHOPAEDIC SURGERY

## 2025-03-31 RX ORDER — TRIAMCINOLONE ACETONIDE 40 MG/ML
40 INJECTION, SUSPENSION INTRA-ARTICULAR; INTRAMUSCULAR
Status: COMPLETED | OUTPATIENT
Start: 2025-03-31 | End: 2025-03-31

## 2025-03-31 RX ORDER — LIDOCAINE HYDROCHLORIDE 10 MG/ML
1 INJECTION, SOLUTION INFILTRATION; PERINEURAL
Status: COMPLETED | OUTPATIENT
Start: 2025-03-31 | End: 2025-03-31

## 2025-03-31 RX ADMIN — TRIAMCINOLONE ACETONIDE 40 MG: 40 INJECTION, SUSPENSION INTRA-ARTICULAR; INTRAMUSCULAR at 14:35

## 2025-03-31 RX ADMIN — LIDOCAINE HYDROCHLORIDE 1 ML: 10 INJECTION, SOLUTION INFILTRATION; PERINEURAL at 14:35

## 2025-03-31 ASSESSMENT — PAIN - FUNCTIONAL ASSESSMENT: PAIN_FUNCTIONAL_ASSESSMENT: 0-10

## 2025-03-31 ASSESSMENT — ENCOUNTER SYMPTOMS
OCCASIONAL FEELINGS OF UNSTEADINESS: 0
DEPRESSION: 0
LOSS OF SENSATION IN FEET: 0

## 2025-03-31 ASSESSMENT — PAIN SCALES - GENERAL: PAINLEVEL_OUTOF10: 2

## 2025-03-31 NOTE — PROGRESS NOTES
79 y.o. male presents today for follow up left base of thumb pain. The patient reports symptoms for years, relieved with shots, last one about 4 months ago. Pain is controlled. Patient reports no numbness and tingling.  Reports no previous surgeries or trauma to the area.  Reports pain worse with use, better at rest.  Pain dull ache, sharp at times. Would like another shot.     Review of Systems    Constitutional: no fever, no chills, not feeling tired, no recent weight gain and no recent weight loss.   ENT: no nosebleeds.   Cardiovascular: no chest pain.   Respiratory: no shortness of breath and no cough.   Gastrointestinal: no abdominal pain, no nausea, no vomiting and no diarrhea.   Musculoskeletal: per HPI  Integumentary: no rashes and no skin wound.   Neurological: no headache.   Psychiatric: no depression and no sleep disturbances.   Endocrine: no muscle weakness and no muscle cramps.   Hematologic/Lymphatic: no swollen glands and no tendency for easy bruising.     All other systems have been reviewed and are negative for complaint    Patient's past medical history, past surgical history, allergies, and medications have been reviewed unless otherwise noted in the chart.     CMC Arthritis Exam  Inspection:  no evidence of infection, no edema, no erythema, no ecchymosis, Palpation:  compartments are soft, pain with palpation over the Thumb CMC joint, Range of Motion:  full wrist and finger range of motion, Stability:  no wrist or finger instability detected, Strength:  5/5  and pinch strength, Skin:  intact, Vascular:  capillary refill <2 seconds distally, Sensation:  intact to light touch distally, Tests:  negative Finkelstein's , positive Thumb CMC Grind.      Constitutional   General appearance: Alert and in no acute distress. Well developed, well nourished.    Eyes   External Eye, Conjunctiva and lids: Normal external exam - pupils were equal in size, round, reactive to light (PERRL) with normal  accommodation and extraocular movements intact (EOMI).   Ears, Nose, Mouth, and Throat   Hearing: Normal.   Neck   Neck: No neck mass was observed. Supple.   Pulmonary   Respiratory effort: No respiratory distress.   Cardiovascular   Examination of extremities: No peripheral edema.   Abdomen   Abdomen: Soft nontender; no abdominal mass palpated.. No rebound, rigidity or guarding.    Skin   Skin and subcutaneous tissue: Normal skin color and pigmentation, normal skin turgor, and no rash.   Neurologic   Sensation: Normal.   Psychiatric   Judgment and insight: Intact.   Orientation to person, place, and time: Alert and oriented x 3.       Mood and affect: Normal.      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 After thorough discussion, the patient has elected to proceed with conservative management. All questions were answered to the patients satisfaction who seems satisfied with the plan.      Discussion I discussed the diagnosis and treatment options with the patient today along with their associated risks and benefits. After thorough discussion, the patient has elected to proceed with a corticosteroid injection with Kenalog and Xylocaine, which was performed in the office today under aseptic technique and the patient tolerated the procedure well.          Ortho Injections:           Injection site left thumb CMC. Medication 1 cc 1% Xylocaine, 1 cc 40 mg Kenalog, Injection given under sterile conditions. No immediate complications noted. Post injection instructions given.  FU 4 months prn - no XR    Patient ID: Michael Dougherty is a 79 y.o. male.    S Inj/Asp: L thumb CMC on 3/31/2025 2:35 PM  Indications: pain  Details: 25 G needle (dorsoradial) approach  Medications: 40 mg triamcinolone acetonide 40 mg/mL; 1 mL lidocaine 10 mg/mL (1 %)  Outcome: tolerated well, no immediate  complications  Procedure, treatment alternatives, risks and benefits explained, specific risks discussed. Consent was given by the patient. Immediately prior to procedure a time out was called to verify the correct patient, procedure, equipment, support staff and site/side marked as required. Patient was prepped and draped in the usual sterile fashion.

## 2025-03-31 NOTE — PROGRESS NOTES
F/U LEFT CMC INJECTION - LAST DONE ON 11/14/2024  WOULD LIKE ANOTHER INJECTION   NO OTHER CONCERNS

## 2025-04-24 DIAGNOSIS — I10 PRIMARY HYPERTENSION: ICD-10-CM

## 2025-04-24 RX ORDER — LOSARTAN POTASSIUM 100 MG/1
100 TABLET ORAL DAILY
Qty: 90 TABLET | Refills: 3 | Status: SHIPPED | OUTPATIENT
Start: 2025-04-24

## 2025-04-30 ENCOUNTER — TELEPHONE (OUTPATIENT)
Dept: PRIMARY CARE | Facility: CLINIC | Age: 80
End: 2025-04-30
Payer: MEDICARE

## 2025-04-30 DIAGNOSIS — E11.9 TYPE 2 DIABETES MELLITUS WITHOUT COMPLICATION, WITH LONG-TERM CURRENT USE OF INSULIN: Primary | ICD-10-CM

## 2025-04-30 DIAGNOSIS — Z79.4 TYPE 2 DIABETES MELLITUS WITHOUT COMPLICATION, WITH LONG-TERM CURRENT USE OF INSULIN: Primary | ICD-10-CM

## 2025-04-30 NOTE — TELEPHONE ENCOUNTER
JORGE  his insulin is not covered anymore with the Ins. Wife Nadya is going to call the Ins and find out the name of the medicine they will cover and let us know.

## 2025-05-05 RX ORDER — INSULIN ASPART 100 [IU]/ML
35 INJECTION, SOLUTION INTRAVENOUS; SUBCUTANEOUS
Qty: 94.5 ML | Refills: 3 | Status: SHIPPED | OUTPATIENT
Start: 2025-05-05 | End: 2026-05-05

## 2025-05-05 RX ORDER — INSULIN GLARGINE-YFGN 100 [IU]/ML
65 INJECTION, SOLUTION SUBCUTANEOUS NIGHTLY
Qty: 58.5 ML | Refills: 3 | Status: SHIPPED | OUTPATIENT
Start: 2025-05-05 | End: 2026-05-05

## 2025-07-02 ENCOUNTER — HOSPITAL ENCOUNTER (OUTPATIENT)
Dept: CARDIOLOGY | Facility: HOSPITAL | Age: 80
Discharge: HOME | End: 2025-07-02
Payer: MEDICARE

## 2025-07-02 DIAGNOSIS — I35.0 SEVERE AORTIC VALVE STENOSIS: ICD-10-CM

## 2025-07-02 DIAGNOSIS — R94.39 CARDIOVASCULAR STRESS TEST ABNORMAL: ICD-10-CM

## 2025-07-02 DIAGNOSIS — I25.10 CORONARY ARTERIOSCLEROSIS: ICD-10-CM

## 2025-07-02 DIAGNOSIS — I10 ESSENTIAL HYPERTENSION: ICD-10-CM

## 2025-07-02 LAB
AORTIC VALVE MEAN GRADIENT: 69 MMHG
AORTIC VALVE PEAK VELOCITY: 5.12 M/S
AV PEAK GRADIENT: 105 MMHG
AVA (PEAK VEL): 0.84 CM2
AVA (VTI): 0.89 CM2
EJECTION FRACTION APICAL 4 CHAMBER: 49
EJECTION FRACTION: 55 %
LEFT ATRIUM VOLUME AREA LENGTH INDEX BSA: 42.3 ML/M2
LEFT VENTRICLE INTERNAL DIMENSION DIASTOLE: 5.37 CM (ref 3.5–6)
LEFT VENTRICULAR OUTFLOW TRACT DIAMETER: 2.4 CM
LV EJECTION FRACTION BIPLANE: 49 %
MITRAL VALVE E/A RATIO: 1.29
RIGHT VENTRICLE FREE WALL PEAK S': 13 CM/S
TRICUSPID ANNULAR PLANE SYSTOLIC EXCURSION: 2.9 CM

## 2025-07-02 PROCEDURE — 93306 TTE W/DOPPLER COMPLETE: CPT

## 2025-07-08 ENCOUNTER — TELEPHONE (OUTPATIENT)
Dept: CARDIOLOGY | Facility: HOSPITAL | Age: 80
End: 2025-07-08
Payer: MEDICARE

## 2025-07-08 NOTE — TELEPHONE ENCOUNTER
Called patient to confirm upcoming appointment on 7/10 AT 11:40AM - left vm to have patient call our office to confirm

## 2025-07-10 ENCOUNTER — APPOINTMENT (OUTPATIENT)
Dept: CARDIOLOGY | Facility: CLINIC | Age: 80
End: 2025-07-10
Payer: MEDICARE

## 2025-07-10 VITALS
HEIGHT: 70 IN | SYSTOLIC BLOOD PRESSURE: 132 MMHG | BODY MASS INDEX: 31.5 KG/M2 | HEART RATE: 52 BPM | WEIGHT: 220 LBS | DIASTOLIC BLOOD PRESSURE: 64 MMHG

## 2025-07-10 DIAGNOSIS — Z71.89 COUNSELING REGARDING ADVANCE CARE PLANNING AND GOALS OF CARE: Primary | ICD-10-CM

## 2025-07-10 DIAGNOSIS — I35.0 SEVERE AORTIC VALVE STENOSIS: ICD-10-CM

## 2025-07-10 PROCEDURE — 1159F MED LIST DOCD IN RCRD: CPT | Performed by: INTERNAL MEDICINE

## 2025-07-10 PROCEDURE — 3075F SYST BP GE 130 - 139MM HG: CPT | Performed by: INTERNAL MEDICINE

## 2025-07-10 PROCEDURE — 99215 OFFICE O/P EST HI 40 MIN: CPT | Performed by: INTERNAL MEDICINE

## 2025-07-10 PROCEDURE — 93005 ELECTROCARDIOGRAM TRACING: CPT | Performed by: INTERNAL MEDICINE

## 2025-07-10 PROCEDURE — 1036F TOBACCO NON-USER: CPT | Performed by: INTERNAL MEDICINE

## 2025-07-10 PROCEDURE — 3078F DIAST BP <80 MM HG: CPT | Performed by: INTERNAL MEDICINE

## 2025-07-10 PROCEDURE — 99213 OFFICE O/P EST LOW 20 MIN: CPT | Performed by: INTERNAL MEDICINE

## 2025-07-10 ASSESSMENT — ENCOUNTER SYMPTOMS
LOSS OF SENSATION IN FEET: 0
DEPRESSION: 1
OCCASIONAL FEELINGS OF UNSTEADINESS: 0

## 2025-07-10 NOTE — PROGRESS NOTES
"Chief Complaint:   Follow-up (Annual edema)     History Of Present Illness:    Michael Dougherty is a 79 y.o. male presenting for annual follow-up, accompanied by his wife.  He has a history of diabetes, dyslipidemia, hypertension, early dementia, severe aortic valve stenosis.  Recently had a CT calcium score ordered by PCP which was abnormal and led to a stress test showing inferior scar with mild LV dysfunction.  Repeat echo from 2024 shows normal LV function severe aortic valve stenosis.  He continues to be asymptomatic.  He has some ankle swelling that seems new to me.  Wife states he is still mowing the lawn with a push more regularly without any difficulty.  No chest pain shortness of breath or loss of consciousness.  Memory has been getting worse, that is her only concern.    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  Unchanged from prior EKGs.     Last Recorded Vitals:  Vitals:    07/10/25 1142   BP: 132/64   BP Location: Left arm   Pulse: 52   Weight: 99.8 kg (220 lb)   Height: 1.778 m (5' 10\")       Past Medical History:  He has no past medical history on file.    Past Surgical History:  He has a past surgical history that includes Hand surgery (12/04/2014); Other surgical history (06/10/2021); Other surgical history (06/10/2021); Other surgical history (06/10/2021); Other surgical history (06/10/2021); Other surgical history (06/10/2021); and Other surgical history (06/10/2021).      Social History:  He reports that he has quit smoking. His smoking use included cigarettes. He has never used smokeless tobacco. He reports that he does not currently use alcohol. He reports that he does not use drugs.    Family History:  Family History[1]     Allergies:  Ezetimibe, Fenofibrate, Niacin, and Doxycycline    Outpatient Medications:  Current Outpatient Medications   Medication Instructions    aspirin 81 mg, Daily    B complex-vitamin C-folic acid " (Nephro-Anneliese) 0.8 mg tablet 0.8 mg, Daily    bisoprolol (ZEBETA) 5 mg, oral, Daily    brimonidine (AlphaGAN P) 0.2 % ophthalmic solution 1 drop, 2 times daily    donepezil (ARICEPT) 5 mg, oral, Nightly    hydroCHLOROthiazide (HYDRODIURIL) 25 mg, oral, Daily    insulin aspart (NOVOLOG) 35 Units, subcutaneous, 3 times daily before meals, Take as directed per insulin instructions.    insulin aspart, with niacinamide, (Fiasp FlexTouch U-100 Insulin) 100 unit/mL (3 mL) injection 35 Units, subcutaneous, 3 times daily (morning, midday, late afternoon), Take as directed per insulin instructions.    insulin glargine-yfgn 65 Units, subcutaneous, Nightly, Take as directed per insulin instructions.    losartan (COZAAR) 100 mg, oral, Daily    OneTouch Ultra Test strip USE 1 TEST STRIP 3 TIMES A DAY    rosuvastatin (CRESTOR) 40 mg, oral, Nightly    timolol (Timoptic) 0.5 % ophthalmic solution 1 drop, 2 times daily       Physical Exam:  Physical Exam  Vitals reviewed.   Constitutional:       Appearance: Normal appearance.   Neck:      Vascular: No carotid bruit or JVD.   Cardiovascular:      Rate and Rhythm: Normal rate and regular rhythm.      Heart sounds: S1 normal and S2 normal. Murmur heard.      Systolic murmur is present with a grade of 3/6.   Pulmonary:      Effort: Pulmonary effort is normal.      Breath sounds: Normal breath sounds.   Abdominal:      General: Abdomen is flat. Bowel sounds are normal.      Palpations: Abdomen is soft.   Musculoskeletal:      Right lower le+ Pitting Edema present.      Left lower le+ Pitting Edema present.   Skin:     General: Skin is warm.   Neurological:      Mental Status: He is alert. Mental status is at baseline.   Psychiatric:         Mood and Affect: Mood normal.         Behavior: Behavior normal.           Last Labs:  CBC -  Lab Results   Component Value Date    WBC 13.5 (H) 2025    HGB 11.7 (L) 2025    HCT 35.8 (L) 2025    MCV 86 2025    PLT 88 (L)  02/27/2025       CMP -  Lab Results   Component Value Date    CALCIUM 9.1 01/10/2025    PROT 5.7 (L) 01/10/2025    ALBUMIN 4.0 01/10/2025    AST 24 01/10/2025    ALT 26 01/10/2025    ALKPHOS 56 01/10/2025    BILITOT 0.7 01/10/2025       LIPID PANEL -   Lab Results   Component Value Date    CHOL 120 01/10/2025    TRIG 115 01/10/2025    HDL 34.0 01/10/2025    CHHDL 3.5 01/10/2025    LDLF 45 05/27/2023    VLDL 23 01/10/2025    NHDL 86 01/10/2025       RENAL FUNCTION PANEL -   Lab Results   Component Value Date    GLUCOSE 113 (H) 01/10/2025     01/10/2025    K 4.0 01/10/2025     01/10/2025    CO2 30 01/10/2025    ANIONGAP 9 (L) 01/10/2025    BUN 27 (H) 01/10/2025    CREATININE 1.12 01/10/2025    GFRMALE 48 (A) 08/16/2023    CALCIUM 9.1 01/10/2025    ALBUMIN 4.0 01/10/2025        Lab Results   Component Value Date    HGBA1C 5.9 (H) 01/10/2025       Last Cardiology Tests:  ECG:  ECG 12 Lead 07/02/2024      Echo:  Transthoracic echo (TTE) complete 07/02/2025      Ejection Fractions:  EF   Date/Time Value Ref Range Status   07/02/2025 11:28 AM 55 %        Cath:  No results found for this or any previous visit from the past 1095 days.      Stress Test:  Nuclear Stress Test 02/23/2024      Cardiac Imaging:  CT cardiac scoring wo IV contrast 01/09/2024          Assessment/Plan   In summary  is a 79-year-old gentleman with asymptomatic severe aortic valve stenosis.     I discussed the natural history of severe aortic valve stenosis. I discussed possible treatment options.        I discussed that potentially he will develop symptoms in the next 6 months to a year. I discussed the nature of those symptoms. We will seek treatment with aortic valve replacement if and when he becomes symptomatic. We will also monitor his LV function periodically with an echocardiogram. If his LV function declines he may require treatment, even if asymptomatic.      He will follow-up closely in office 6 months with our FRANCO in a  year with me with a repeat echo.  At this time his dimensionless index is 0.2 with a peak velocity of close to 5, nearing critical aortic valve stenosis.  I advised that we should start referring to structural heart team in anticipation of procedure quite soon, unless he chooses to have palliative care only.  Advised to discuss with family members plus PCP and let us know.  Once decision has been made we can refer to structural heart team.    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  EKG also is consistent with that.  At this time due to lack of symptoms there is nothing to do other than aggressive risk factor modification.  His blood pressure, lipid levels all are ideal.     Continue current management.         Charles Manzo MD         [1]   Family History  Problem Relation Name Age of Onset    Diabetes Mother      Hypertension Father      Diabetes Father      Coronary artery disease Sister      Diabetes Brother      Melanoma Brother

## 2025-07-11 LAB
ATRIAL RATE: 52 BPM
P AXIS: 47 DEGREES
P OFFSET: 136 MS
P ONSET: 96 MS
PR INTERVAL: 246 MS
Q ONSET: 219 MS
QRS COUNT: 8 BEATS
QRS DURATION: 112 MS
QT INTERVAL: 446 MS
QTC CALCULATION(BAZETT): 414 MS
QTC FREDERICIA: 425 MS
R AXIS: -16 DEGREES
T AXIS: 95 DEGREES
T OFFSET: 442 MS
VENTRICULAR RATE: 52 BPM

## 2025-07-12 LAB
ALBUMIN SERPL-MCNC: 4 G/DL (ref 3.6–5.1)
ALP SERPL-CCNC: 56 U/L (ref 35–144)
ALT SERPL-CCNC: 23 U/L (ref 9–46)
ANION GAP SERPL CALCULATED.4IONS-SCNC: 8 MMOL/L (CALC) (ref 7–17)
AST SERPL-CCNC: 27 U/L (ref 10–35)
BILIRUB SERPL-MCNC: 0.9 MG/DL (ref 0.2–1.2)
BUN SERPL-MCNC: 32 MG/DL (ref 7–25)
CALCIUM SERPL-MCNC: 9.3 MG/DL (ref 8.6–10.3)
CHLORIDE SERPL-SCNC: 107 MMOL/L (ref 98–110)
CHOLEST SERPL-MCNC: 106 MG/DL
CHOLEST/HDLC SERPL: 3.2 (CALC)
CO2 SERPL-SCNC: 26 MMOL/L (ref 20–32)
CREAT SERPL-MCNC: 1.27 MG/DL (ref 0.7–1.28)
EGFRCR SERPLBLD CKD-EPI 2021: 58 ML/MIN/1.73M2
EST. AVERAGE GLUCOSE BLD GHB EST-MCNC: 126 MG/DL
EST. AVERAGE GLUCOSE BLD GHB EST-SCNC: 7 MMOL/L
GLUCOSE SERPL-MCNC: 134 MG/DL (ref 65–99)
HBA1C MFR BLD: 6 %
HDLC SERPL-MCNC: 33 MG/DL
LDLC SERPL CALC-MCNC: 53 MG/DL (CALC)
NONHDLC SERPL-MCNC: 73 MG/DL (CALC)
POTASSIUM SERPL-SCNC: 4 MMOL/L (ref 3.5–5.3)
PROT SERPL-MCNC: 5.9 G/DL (ref 6.1–8.1)
SODIUM SERPL-SCNC: 141 MMOL/L (ref 135–146)
TRIGL SERPL-MCNC: 115 MG/DL

## 2025-07-16 ENCOUNTER — APPOINTMENT (OUTPATIENT)
Dept: PRIMARY CARE | Facility: CLINIC | Age: 80
End: 2025-07-16
Payer: MEDICARE

## 2025-07-16 VITALS
BODY MASS INDEX: 32 KG/M2 | HEART RATE: 50 BPM | SYSTOLIC BLOOD PRESSURE: 124 MMHG | WEIGHT: 223 LBS | DIASTOLIC BLOOD PRESSURE: 60 MMHG | TEMPERATURE: 97.8 F | OXYGEN SATURATION: 99 %

## 2025-07-16 DIAGNOSIS — F03.90 DEMENTIA WITHOUT BEHAVIORAL DISTURBANCE, PSYCHOTIC DISTURBANCE, MOOD DISTURBANCE, OR ANXIETY, UNSPECIFIED DEMENTIA SEVERITY, UNSPECIFIED DEMENTIA TYPE: Primary | ICD-10-CM

## 2025-07-16 DIAGNOSIS — F41.9 ANXIETY: ICD-10-CM

## 2025-07-16 DIAGNOSIS — Z79.4 TYPE 2 DIABETES MELLITUS WITHOUT COMPLICATION, WITH LONG-TERM CURRENT USE OF INSULIN: ICD-10-CM

## 2025-07-16 DIAGNOSIS — I10 PRIMARY HYPERTENSION: ICD-10-CM

## 2025-07-16 DIAGNOSIS — N18.31 STAGE 3A CHRONIC KIDNEY DISEASE (MULTI): ICD-10-CM

## 2025-07-16 DIAGNOSIS — E11.9 TYPE 2 DIABETES MELLITUS WITHOUT COMPLICATION, WITH LONG-TERM CURRENT USE OF INSULIN: ICD-10-CM

## 2025-07-16 DIAGNOSIS — E66.01 MORBID OBESITY (MULTI): ICD-10-CM

## 2025-07-16 DIAGNOSIS — E11.59 CORONARY ARTERY DISEASE DUE TO TYPE 2 DIABETES MELLITUS (MULTI): ICD-10-CM

## 2025-07-16 DIAGNOSIS — I25.10 CORONARY ARTERY DISEASE DUE TO TYPE 2 DIABETES MELLITUS (MULTI): ICD-10-CM

## 2025-07-16 DIAGNOSIS — E78.2 MIXED HYPERLIPIDEMIA: ICD-10-CM

## 2025-07-16 PROCEDURE — 99214 OFFICE O/P EST MOD 30 MIN: CPT | Performed by: FAMILY MEDICINE

## 2025-07-16 PROCEDURE — 3078F DIAST BP <80 MM HG: CPT | Performed by: FAMILY MEDICINE

## 2025-07-16 PROCEDURE — G2211 COMPLEX E/M VISIT ADD ON: HCPCS | Performed by: FAMILY MEDICINE

## 2025-07-16 PROCEDURE — 3074F SYST BP LT 130 MM HG: CPT | Performed by: FAMILY MEDICINE

## 2025-07-16 PROCEDURE — 1159F MED LIST DOCD IN RCRD: CPT | Performed by: FAMILY MEDICINE

## 2025-07-16 RX ORDER — LORAZEPAM 0.5 MG/1
0.5 TABLET ORAL DAILY PRN
Qty: 30 TABLET | Refills: 3 | Status: SHIPPED | OUTPATIENT
Start: 2025-07-16 | End: 2025-11-13

## 2025-07-16 RX ORDER — ROSUVASTATIN CALCIUM 40 MG/1
40 TABLET, COATED ORAL NIGHTLY
Qty: 90 TABLET | Refills: 3 | Status: SHIPPED | OUTPATIENT
Start: 2025-07-16 | End: 2026-07-16

## 2025-07-16 ASSESSMENT — PATIENT HEALTH QUESTIONNAIRE - PHQ9
1. LITTLE INTEREST OR PLEASURE IN DOING THINGS: NOT AT ALL
2. FEELING DOWN, DEPRESSED OR HOPELESS: NOT AT ALL
SUM OF ALL RESPONSES TO PHQ9 QUESTIONS 1 AND 2: 0

## 2025-07-16 NOTE — PROGRESS NOTES
Subjective   Patient ID: Michael Dougherty is a 79 y.o. male who presents for Diabetes (Recheck, review bw), Hyperlipidemia, and Hypertension.  HPI    HTN: well controlled    DM2: a1c was 6.3 this time which is an improvement.  Doing well.    CRI: Kidneys were stable    Dementia: has been having worsening cognition in recent years.  Forgets tasks, directions.  Needs more help doing finances.  Mother had Alzheimers.        Patient Active Problem List   Diagnosis   • Adverse effect of calcium-channel blocker   • Atherosclerosis of aorta   • Cataracts, bilateral   • Chondrocalcinosis of right knee   • Coronary artery disease due to type 2 diabetes mellitus (Multi)   • Diabetes mellitus (Multi)   • Gait abnormality   • Heart murmur   • Essential hypertension   • Hyperglycemia due to type 2 diabetes mellitus (Multi)   • Hyperlipidemia   • Lesion of ulnar nerve, left upper limb   • Localized primary osteoarthritis of carpometacarpal (CMC) joint of left wrist   • Low back pain   • Morbid obesity (Multi)   • Nonadherence with dietary restriction   • Stage 3a chronic kidney disease (Multi)   • Sensorineural hearing loss (SNHL) of both ears   • Anemia   • Severe aortic valve stenosis   • Cardiovascular stress test abnormal   • Counseling regarding advance care planning and goals of care       Social Connections: Not on file       Current Outpatient Medications on File Prior to Visit   Medication Sig Dispense Refill   • aspirin 81 mg EC tablet Take 1 tablet (81 mg) by mouth once daily.     • B complex-vitamin C-folic acid (Nephro-Anneliese) 0.8 mg tablet Take 1 tablet by mouth once daily.     • bisoprolol (Zebeta) 5 mg tablet Take 1 tablet (5 mg) by mouth once daily. 90 tablet 3   • brimonidine (AlphaGAN P) 0.2 % ophthalmic solution Administer 1 drop into both eyes 2 times a day.     • donepezil (Aricept) 5 mg tablet Take 1 tablet (5 mg) by mouth once daily at bedtime. 90 tablet 3   • hydroCHLOROthiazide (HYDRODiuril) 25 mg tablet Take  1 tablet (25 mg) by mouth once daily. 90 tablet 3   • insulin glargine-yfgn 100 unit/mL (3 mL) pen Inject 65 Units under the skin once daily at bedtime. Take as directed per insulin instructions. 58.5 mL 3   • losartan (Cozaar) 100 mg tablet TAKE 1 TABLET BY MOUTH ONCE DAILY 90 tablet 3   • OneTouch Ultra Test strip USE 1 TEST STRIP 3 TIMES A  strip 3   • timolol (Timoptic) 0.5 % ophthalmic solution Administer 1 drop into both eyes 2 times a day.     • [DISCONTINUED] rosuvastatin (Crestor) 40 mg tablet Take 1 tablet (40 mg) by mouth once daily at bedtime. 90 tablet 3   • insulin aspart, with niacinamide, (Fiasp FlexTouch U-100 Insulin) 100 unit/mL (3 mL) injection Inject 35 Units under the skin 3 times daily (morning, midday, late afternoon). Take as directed per insulin instructions. 94.5 mL 3   • [DISCONTINUED] insulin aspart (NovoLOG) 100 unit/mL (3 mL) pen Inject 35 Units under the skin 3 times a day before meals. Take as directed per insulin instructions. (Patient not taking: Reported on 7/10/2025) 94.5 mL 3     No current facility-administered medications on file prior to visit.        Vitals:    07/16/25 1303   BP: 124/60   Pulse: 50   Temp: 36.6 °C (97.8 °F)   SpO2: 99%     Vitals:    07/16/25 1303   Weight: 101 kg (223 lb)       Review of Systems   All other systems reviewed and are negative.      Objective     Physical Exam  Constitutional:       Appearance: Normal appearance. He is well-developed.   HENT:      Head: Atraumatic.     Cardiovascular:      Rate and Rhythm: Normal rate and regular rhythm.      Heart sounds: Normal heart sounds. No murmur heard.  Pulmonary:      Effort: Pulmonary effort is normal.      Breath sounds: Normal breath sounds.   Abdominal:      General: Bowel sounds are normal.      Palpations: Abdomen is soft.     Skin:     General: Skin is warm.     Neurological:      General: No focal deficit present.      Mental Status: He is alert.     Psychiatric:         Mood and  Affect: Mood normal.         Orders Only on 07/11/2025   Component Date Value Ref Range Status   • CHOLESTEROL, TOTAL 07/11/2025 106  <200 mg/dL Final   • HDL CHOLESTEROL 07/11/2025 33 (L)  > OR = 40 mg/dL Final   • TRIGLYCERIDES 07/11/2025 115  <150 mg/dL Final   • LDL-CHOLESTEROL 07/11/2025 53  mg/dL (calc) Final    Comment: Reference range: <100     Desirable range <100 mg/dL for primary prevention;    <70 mg/dL for patients with CHD or diabetic patients   with > or = 2 CHD risk factors.     LDL-C is now calculated using the Darrin-Espinoza   calculation, which is a validated novel method providing   better accuracy than the Friedewald equation in the   estimation of LDL-C.   Darrin SS et al. GELA. 2013;310(19): 7866-5782   (http://education.Snappy shuttle/faq/DWL498)     • CHOL/HDLC RATIO 07/11/2025 3.2  <5.0 (calc) Final   • NON HDL CHOLESTEROL 07/11/2025 73  <130 mg/dL (calc) Final    Comment: For patients with diabetes plus 1 major ASCVD risk   factor, treating to a non-HDL-C goal of <100 mg/dL   (LDL-C of <70 mg/dL) is considered a therapeutic   option.     • GLUCOSE 07/11/2025 134 (H)  65 - 99 mg/dL Final    Comment:               Fasting reference interval     For someone without known diabetes, a glucose  value >125 mg/dL indicates that they may have  diabetes and this should be confirmed with a  follow-up test.        • UREA NITROGEN (BUN) 07/11/2025 32 (H)  7 - 25 mg/dL Final   • CREATININE 07/11/2025 1.27  0.70 - 1.28 mg/dL Final   • EGFR 07/11/2025 58 (L)  > OR = 60 mL/min/1.73m2 Final   • SODIUM 07/11/2025 141  135 - 146 mmol/L Final   • POTASSIUM 07/11/2025 4.0  3.5 - 5.3 mmol/L Final   • CHLORIDE 07/11/2025 107  98 - 110 mmol/L Final   • CARBON DIOXIDE 07/11/2025 26  20 - 32 mmol/L Final   • ELECTROLYTE BALANCE 07/11/2025 8  7 - 17 mmol/L (calc) Final   • CALCIUM 07/11/2025 9.3  8.6 - 10.3 mg/dL Final   • PROTEIN, TOTAL 07/11/2025 5.9 (L)  6.1 - 8.1 g/dL Final   • ALBUMIN 07/11/2025 4.0  3.6 -  5.1 g/dL Final   • BILIRUBIN, TOTAL 07/11/2025 0.9  0.2 - 1.2 mg/dL Final   • ALKALINE PHOSPHATASE 07/11/2025 56  35 - 144 U/L Final   • AST 07/11/2025 27  10 - 35 U/L Final   • ALT 07/11/2025 23  9 - 46 U/L Final   • HEMOGLOBIN A1c 07/11/2025 6.0 (H)  <5.7 % Final    Comment: For someone without known diabetes, a hemoglobin   A1c value between 5.7% and 6.4% is consistent with  prediabetes and should be confirmed with a   follow-up test.     For someone with known diabetes, a value <7%  indicates that their diabetes is well controlled. A1c  targets should be individualized based on duration of  diabetes, age, comorbid conditions, and other  considerations.     This assay result is consistent with an increased risk  of diabetes.     Currently, no consensus exists regarding use of  hemoglobin A1c for diagnosis of diabetes for children.        • eAG (mg/dL) 07/11/2025 126  mg/dL Final   • eAG (mmol/L) 07/11/2025 7.0  mmol/L Final   Office Visit on 07/10/2025   Component Date Value Ref Range Status   • Ventricular Rate 07/10/2025 52  BPM Final   • Atrial Rate 07/10/2025 52  BPM Final   • CO Interval 07/10/2025 246  ms Final   • QRS Duration 07/10/2025 112  ms Final   • QT Interval 07/10/2025 446  ms Final   • QTC Calculation(Bazett) 07/10/2025 414  ms Final   • P Axis 07/10/2025 47  degrees Final   • R Axis 07/10/2025 -16  degrees Final   • T Axis 07/10/2025 95  degrees Final   • QRS Count 07/10/2025 8  beats Final   • Q Onset 07/10/2025 219  ms Final   • P Onset 07/10/2025 96  ms Final   • P Offset 07/10/2025 136  ms Final   • T Offset 07/10/2025 442  ms Final   • QTC Fredericia 07/10/2025 425  ms Final   Hospital Outpatient Visit on 07/02/2025   Component Date Value Ref Range Status   • LVOT diam 07/02/2025 2.40  cm Final   • LV Biplane EF 07/02/2025 49  % Final   • MV E/A ratio 07/02/2025 1.29   Final   • Tricuspid annular plane systolic e* 07/02/2025 2.9  cm Final   • AV mn grad 07/02/2025 69  mmHg Final   • LA vol  index A/L 07/02/2025 42.3  ml/m2 Final   • AV pk anabel 07/02/2025 5.12  m/s Final   • LV EF 07/02/2025 55  % Final   • RV free wall pk S' 07/02/2025 13.00  cm/s Final   • LVIDd 07/02/2025 5.37  cm Final   • Aortic Valve Area by Continuity of* 07/02/2025 0.89  cm2 Final   • Aortic Valve Area by Continuity of* 07/02/2025 0.84  cm2 Final   • AV pk grad 07/02/2025 105  mmHg Final   • LV A4C EF 07/02/2025 49.0   Final       Assessment/Plan   Problem List Items Addressed This Visit         ICD-10-CM    Coronary artery disease due to type 2 diabetes mellitus (Multi) E11.59, I25.10    Relevant Medications    rosuvastatin (Crestor) 40 mg tablet    Diabetes mellitus (Multi) E11.9    Relevant Medications    rosuvastatin (Crestor) 40 mg tablet    Other Relevant Orders    Comprehensive metabolic panel    Lipid panel    Hemoglobin A1c    Hyperlipidemia E78.5    Relevant Medications    rosuvastatin (Crestor) 40 mg tablet    Morbid obesity (Multi) E66.01    Stage 3a chronic kidney disease (Multi) N18.31    Relevant Medications    rosuvastatin (Crestor) 40 mg tablet   Other Visit Diagnoses       Codes      Dementia without behavioral disturbance, psychotic disturbance, mood disturbance, or anxiety, unspecified dementia severity, unspecified dementia type    -  Primary F03.90      Primary hypertension     I10      Anxiety     F41.9    Relevant Medications    LORazepam (Ativan) 0.5 mg tablet        Refilled patient's medications.  Doing well.  Fu in 4 mo  Patient was identified as a fall risk. Risk prevention instructions provided.

## 2025-07-16 NOTE — PATIENT INSTRUCTIONS
[employer] [facility] [employer]        Ways to Help Prevent Falls at Home    Quick Tips   ? Ask for help if you need it. Most people want to help!   ? Get up slowly after sitting or laying down   ? Wear a medical alert device or keep cell phone in your pocket   ? Use night lights, especially areas near a bathroom   ? Keep the items you use often within reach on a small stool or end table   ? Use an assistive device such as walker or cane, as directed by provider/physical therapy   ? Use a non-slip mat and grab bars in your bathroom. Look for home health sections for best options     Other Areas to Focus On   ? Exercise and nutrition: Regular exercise or taking a falls prevention class are great ways improve strength and balance. Don’t forget to stay hydrated and bring a snack!   ? Medicine side effects: Some medicines can make you sleepy or dizzy, which could cause a fall. Ask your healthcare provider about the side effects your medicines could cause. Be sure to let them know if you take any vitamins or supplements as well.   ? Tripping hazards: Remove items you could trip on, such as loose mats, rugs, cords, and clutter. Wear closed toe shoes with rubber soles.   ? Health and wellness: Get regular checkups with your healthcare provider, plus routine vision and hearing screenings. Talk with your healthcare provider about:   o Your medicines and the possible side effects - bring them in a bag if that is easier!   o Problems with balance or feeling dizzy   o Ways to promote bone health, such as Vitamin D and calcium supplements   o Questions or concerns about falling     *Ask your healthcare team if you have questions     [facility], 2022

## 2025-07-28 ENCOUNTER — APPOINTMENT (OUTPATIENT)
Dept: ORTHOPEDIC SURGERY | Facility: HOSPITAL | Age: 80
End: 2025-07-28
Payer: MEDICARE

## 2025-07-29 ENCOUNTER — APPOINTMENT (OUTPATIENT)
Dept: ORTHOPEDIC SURGERY | Facility: HOSPITAL | Age: 80
End: 2025-07-29
Payer: MEDICARE

## 2025-07-29 VITALS — WEIGHT: 223 LBS | BODY MASS INDEX: 31.92 KG/M2 | HEIGHT: 70 IN

## 2025-07-29 DIAGNOSIS — M19.032 LOCALIZED PRIMARY OSTEOARTHRITIS OF CARPOMETACARPAL (CMC) JOINT OF LEFT WRIST: Primary | ICD-10-CM

## 2025-07-29 PROCEDURE — 99213 OFFICE O/P EST LOW 20 MIN: CPT

## 2025-07-29 PROCEDURE — 20600 DRAIN/INJ JOINT/BURSA W/O US: CPT | Mod: LT

## 2025-07-29 PROCEDURE — 2500000004 HC RX 250 GENERAL PHARMACY W/ HCPCS (ALT 636 FOR OP/ED)

## 2025-07-29 PROCEDURE — 1159F MED LIST DOCD IN RCRD: CPT

## 2025-07-29 PROCEDURE — 1125F AMNT PAIN NOTED PAIN PRSNT: CPT

## 2025-07-29 PROCEDURE — 99212 OFFICE O/P EST SF 10 MIN: CPT | Mod: 25

## 2025-07-29 RX ORDER — LIDOCAINE HYDROCHLORIDE 10 MG/ML
1 INJECTION, SOLUTION INFILTRATION; PERINEURAL
Status: COMPLETED | OUTPATIENT
Start: 2025-07-29 | End: 2025-07-29

## 2025-07-29 RX ORDER — TRIAMCINOLONE ACETONIDE 40 MG/ML
40 INJECTION, SUSPENSION INTRA-ARTICULAR; INTRAMUSCULAR
Status: COMPLETED | OUTPATIENT
Start: 2025-07-29 | End: 2025-07-29

## 2025-07-29 RX ADMIN — LIDOCAINE HYDROCHLORIDE 1 ML: 10 INJECTION, SOLUTION INFILTRATION; PERINEURAL at 13:15

## 2025-07-29 RX ADMIN — TRIAMCINOLONE ACETONIDE 40 MG: 400 INJECTION, SUSPENSION INTRA-ARTICULAR; INTRAMUSCULAR at 13:15

## 2025-07-29 ASSESSMENT — PAIN - FUNCTIONAL ASSESSMENT: PAIN_FUNCTIONAL_ASSESSMENT: 0-10

## 2025-07-29 ASSESSMENT — PAIN SCALES - GENERAL: PAINLEVEL_OUTOF10: 1

## 2025-07-29 NOTE — PROGRESS NOTES
New patient - CMC left injection - last done on 03/31/2025 by    Here for another one today -but states that the pain isn't too bad

## 2025-07-29 NOTE — PROGRESS NOTES
ORTHOPEDIC FOLLOW UP      ============================  IMPRESSION/PLAN:  ============================  79 y.o. male with left first CMC joint osteoarthritis    PLAN:  -Discussion I discussed the diagnosis and treatment options with the patient today along with their associated risks and benefits. After thorough discussion, the patient has elected to proceed with a corticosteroid injection with Kenalog and Lidocaine, which was performed in the office today under aseptic technique and the patient tolerated the procedure well.  - Plan following up in 4 months with Dr. Parekh for repeat left first CMC joint injection  Patient ID: Michael Dougherty is a 79 y.o. male.    S Inj/Asp: L thumb CMC on 7/29/2025 1:15 PM  Indications: pain  Details: 25 G needle (dorsoradial) approach  Medications: 40 mg triamcinolone acetonide 40 mg/mL; 1 mL lidocaine 10 mg/mL (1 %)  Outcome: tolerated well, no immediate complications    Prepped with alcohol. Patient tolerated injection well. Band-aid applied to injection site.     Procedure, treatment alternatives, risks and benefits explained, specific risks discussed. Consent was given by the patient. Immediately prior to procedure a time out was called to verify the correct patient, procedure, equipment, support staff and site/side marked as required.             Michael Dougherty presents today for follow up of the above condition.  He has been doing well since his previous visit.  He received an injection from Dr. Parekh on 3/31/2025.  His symptoms are beginning to return and he would like another injection today.  Denies any other concerns or complaints.      Review of Systems:   Constitutional: See HPI for pain assessment, No significant weight loss, recent trauma. Denies fevers/chills  Cardiovascular: No chest pain, shortness of breath  Respiratory: No difficulty breathing, cough  Gastrointestinal: No nausea, vomiting, diarrhea, constipation  Musculoskeletal: Noted in HPI, no arthralgias    Integumentary: No rashes, easy bruising, redness   Neurological: no numbness or tingling in extremities, no gait disturbances     Problem List[1]    Medical History[2]     Allergies[3]     Surgical History[4]     Family History[5]     Social History     Socioeconomic History    Marital status:      Spouse name: Not on file    Number of children: Not on file    Years of education: Not on file    Highest education level: Not on file   Occupational History    Not on file   Tobacco Use    Smoking status: Former     Current packs/day: 1.00     Types: Cigarettes    Smokeless tobacco: Never   Vaping Use    Vaping status: Never Used   Substance and Sexual Activity    Alcohol use: Not Currently    Drug use: Never    Sexual activity: Not on file   Other Topics Concern    Not on file   Social History Narrative    Not on file     Social Drivers of Health     Financial Resource Strain: Not on file   Food Insecurity: Not on file   Transportation Needs: Not on file   Physical Activity: Not on file   Stress: Not on file   Social Connections: Not on file   Intimate Partner Violence: Not on file   Housing Stability: Not on file        CURRENT MEDICATIONS:   Current Medications[6]     =================================  EXAM  =================================  Constitutional   General appearance: Alert and in no acute distress. Well developed, well nourished.    Eyes   External Eye, Conjunctiva and lids: Normal external exam - extraocular movements intact (EOMI).   Ears, Nose, Mouth, and Throat   Hearing: Normal.   Neck   Neck: No neck mass was observed. Supple.   Pulmonary   Respiratory effort: No respiratory distress.   Cardiovascular   Examination of extremities: No peripheral edema.   Psychiatric   Judgment and insight: Intact.   Orientation to person, place, and time: Alert and oriented x 3.   Mood and affect: Normal.   Musculoskeletal:   No obvious gross abnormalities or deformities.  Mild tenderness to palpation over  first CMC joint.  Crepitus appreciated with range of motion.  Neurovascular status intact distally light touch.  Radial pulse 2+.  Capillary fill less than 2 seconds.    Body mass index is 32 kg/m².          Holly Hernandez PA-C  Physician Assistant  Orthopedic Surgery  [facility]           [1]   Patient Active Problem List  Diagnosis    Adverse effect of calcium-channel blocker    Atherosclerosis of aorta    Cataracts, bilateral    Chondrocalcinosis of right knee    Coronary artery disease due to type 2 diabetes mellitus (Multi)    Diabetes mellitus (Multi)    Gait abnormality    Heart murmur    Essential hypertension    Hyperglycemia due to type 2 diabetes mellitus (Multi)    Hyperlipidemia    Lesion of ulnar nerve, left upper limb    Localized primary osteoarthritis of carpometacarpal (CMC) joint of left wrist    Low back pain    Morbid obesity (Multi)    Nonadherence with dietary restriction    Stage 3a chronic kidney disease (Multi)    Sensorineural hearing loss (SNHL) of both ears    Anemia    Severe aortic valve stenosis    Cardiovascular stress test abnormal    Counseling regarding advance care planning and goals of care   [2] History reviewed. No pertinent past medical history.  [3]   Allergies  Allergen Reactions    Ezetimibe Diarrhea    Fenofibrate Other    Niacin Other    Doxycycline Other   [4]   Past Surgical History:  Procedure Laterality Date    HAND SURGERY  12/04/2014    Hand Surgery                                                                                                                                                          OTHER SURGICAL HISTORY  06/10/2021    Carpal tunnel surgery    OTHER SURGICAL HISTORY  06/10/2021    Trigger finger repair    OTHER SURGICAL HISTORY  06/10/2021    Wrist surgery    OTHER SURGICAL HISTORY  06/10/2021    Cataract surgery    OTHER SURGICAL HISTORY  06/10/2021    Foot surgery    OTHER SURGICAL HISTORY  06/10/2021    Knee  replacement   [5]   Family History  Problem Relation Name Age of Onset    Diabetes Mother      Hypertension Father      Diabetes Father      Coronary artery disease Sister      Diabetes Brother      Melanoma Brother     [6]   Current Outpatient Medications   Medication Sig Dispense Refill    aspirin 81 mg EC tablet Take 1 tablet (81 mg) by mouth once daily.      B complex-vitamin C-folic acid (Nephro-Anneliese) 0.8 mg tablet Take 1 tablet by mouth once daily.      bisoprolol (Zebeta) 5 mg tablet Take 1 tablet (5 mg) by mouth once daily. 90 tablet 3    brimonidine (AlphaGAN P) 0.2 % ophthalmic solution Administer 1 drop into both eyes 2 times a day.      donepezil (Aricept) 5 mg tablet Take 1 tablet (5 mg) by mouth once daily at bedtime. 90 tablet 3    hydroCHLOROthiazide (HYDRODiuril) 25 mg tablet Take 1 tablet (25 mg) by mouth once daily. 90 tablet 3    insulin aspart, with niacinamide, (Fiasp FlexTouch U-100 Insulin) 100 unit/mL (3 mL) injection Inject 35 Units under the skin 3 times daily (morning, midday, late afternoon). Take as directed per insulin instructions. 94.5 mL 3    insulin glargine-yfgn 100 unit/mL (3 mL) pen Inject 65 Units under the skin once daily at bedtime. Take as directed per insulin instructions. 58.5 mL 3    LORazepam (Ativan) 0.5 mg tablet Take 1 tablet (0.5 mg) by mouth once daily as needed for anxiety. 30 tablet 3    losartan (Cozaar) 100 mg tablet TAKE 1 TABLET BY MOUTH ONCE DAILY 90 tablet 3    OneTouch Ultra Test strip USE 1 TEST STRIP 3 TIMES A  strip 3    rosuvastatin (Crestor) 40 mg tablet Take 1 tablet (40 mg) by mouth once daily at bedtime. 90 tablet 3    timolol (Timoptic) 0.5 % ophthalmic solution Administer 1 drop into both eyes 2 times a day.       No current facility-administered medications for this visit.

## 2025-09-04 ENCOUNTER — OFFICE VISIT (OUTPATIENT)
Dept: HEMATOLOGY/ONCOLOGY | Facility: CLINIC | Age: 80
End: 2025-09-04
Payer: MEDICARE

## 2025-09-04 ENCOUNTER — TELEPHONE (OUTPATIENT)
Dept: PRIMARY CARE | Facility: CLINIC | Age: 80
End: 2025-09-04

## 2025-09-04 VITALS
TEMPERATURE: 97.9 F | OXYGEN SATURATION: 98 % | WEIGHT: 219.25 LBS | RESPIRATION RATE: 18 BRPM | SYSTOLIC BLOOD PRESSURE: 93 MMHG | HEART RATE: 50 BPM | DIASTOLIC BLOOD PRESSURE: 39 MMHG | BODY MASS INDEX: 31.46 KG/M2

## 2025-09-04 DIAGNOSIS — I10 PRIMARY HYPERTENSION: Primary | ICD-10-CM

## 2025-09-04 DIAGNOSIS — D72.829 LEUKOCYTOSIS, UNSPECIFIED TYPE: ICD-10-CM

## 2025-09-04 DIAGNOSIS — C91.10 CLL (CHRONIC LYMPHOCYTIC LEUKEMIA) (MULTI): ICD-10-CM

## 2025-09-04 DIAGNOSIS — D72.820 MONOCLONAL B-CELL LYMPHOCYTOSIS: ICD-10-CM

## 2025-09-04 DIAGNOSIS — D72.820 LYMPHOCYTOSIS: ICD-10-CM

## 2025-09-04 LAB
ALBUMIN SERPL BCP-MCNC: 4.1 G/DL (ref 3.4–5)
ALP SERPL-CCNC: 55 U/L (ref 33–136)
ALT SERPL W P-5'-P-CCNC: 40 U/L (ref 10–52)
ANION GAP SERPL CALC-SCNC: 9 MMOL/L (ref 10–20)
AST SERPL W P-5'-P-CCNC: 35 U/L (ref 9–39)
BASOPHILS # BLD AUTO: 0.07 X10*3/UL (ref 0–0.1)
BASOPHILS NFR BLD AUTO: 0.5 %
BILIRUB SERPL-MCNC: 1 MG/DL (ref 0–1.2)
BUN SERPL-MCNC: 30 MG/DL (ref 6–23)
CALCIUM SERPL-MCNC: 9.1 MG/DL (ref 8.6–10.3)
CHLORIDE SERPL-SCNC: 104 MMOL/L (ref 98–107)
CO2 SERPL-SCNC: 29 MMOL/L (ref 21–32)
CREAT SERPL-MCNC: 1.57 MG/DL (ref 0.5–1.3)
EGFRCR SERPLBLD CKD-EPI 2021: 45 ML/MIN/1.73M*2
EOSINOPHIL # BLD AUTO: 0.28 X10*3/UL (ref 0–0.4)
EOSINOPHIL NFR BLD AUTO: 2.2 %
ERYTHROCYTE [DISTWIDTH] IN BLOOD BY AUTOMATED COUNT: 15.1 % (ref 11.5–14.5)
GLUCOSE SERPL-MCNC: 139 MG/DL (ref 74–99)
HCT VFR BLD AUTO: 36.5 % (ref 41–52)
HGB BLD-MCNC: 12 G/DL (ref 13.5–17.5)
IGM SERPL-MCNC: 32 MG/DL (ref 40–230)
IMM GRANULOCYTES # BLD AUTO: 0.01 X10*3/UL (ref 0–0.5)
IMM GRANULOCYTES NFR BLD AUTO: 0.1 % (ref 0–0.9)
LYMPHOCYTES # BLD AUTO: 6.4 X10*3/UL (ref 0.8–3)
LYMPHOCYTES NFR BLD AUTO: 49.5 %
MCH RBC QN AUTO: 28.4 PG (ref 26–34)
MCHC RBC AUTO-ENTMCNC: 32.9 G/DL (ref 32–36)
MCV RBC AUTO: 86 FL (ref 80–100)
MONOCYTES # BLD AUTO: 0.73 X10*3/UL (ref 0.05–0.8)
MONOCYTES NFR BLD AUTO: 5.7 %
NEUTROPHILS # BLD AUTO: 5.43 X10*3/UL (ref 1.6–5.5)
NEUTROPHILS NFR BLD AUTO: 42 %
PLATELET # BLD AUTO: 92 X10*3/UL (ref 150–450)
POTASSIUM SERPL-SCNC: 4.3 MMOL/L (ref 3.5–5.3)
PROT SERPL-MCNC: 6.3 G/DL (ref 6.4–8.2)
RBC # BLD AUTO: 4.23 X10*6/UL (ref 4.5–5.9)
RBC MORPH BLD: NORMAL
SODIUM SERPL-SCNC: 138 MMOL/L (ref 136–145)
WBC # BLD AUTO: 12.9 X10*3/UL (ref 4.4–11.3)

## 2025-09-04 PROCEDURE — 36415 COLL VENOUS BLD VENIPUNCTURE: CPT | Performed by: INTERNAL MEDICINE

## 2025-09-04 PROCEDURE — 3074F SYST BP LT 130 MM HG: CPT | Performed by: INTERNAL MEDICINE

## 2025-09-04 PROCEDURE — 85025 COMPLETE CBC W/AUTO DIFF WBC: CPT | Performed by: INTERNAL MEDICINE

## 2025-09-04 PROCEDURE — 99213 OFFICE O/P EST LOW 20 MIN: CPT | Performed by: INTERNAL MEDICINE

## 2025-09-04 PROCEDURE — 1126F AMNT PAIN NOTED NONE PRSNT: CPT | Performed by: INTERNAL MEDICINE

## 2025-09-04 PROCEDURE — 1036F TOBACCO NON-USER: CPT | Performed by: INTERNAL MEDICINE

## 2025-09-04 PROCEDURE — 1159F MED LIST DOCD IN RCRD: CPT | Performed by: INTERNAL MEDICINE

## 2025-09-04 PROCEDURE — 80053 COMPREHEN METABOLIC PANEL: CPT | Performed by: INTERNAL MEDICINE

## 2025-09-04 PROCEDURE — 3078F DIAST BP <80 MM HG: CPT | Performed by: INTERNAL MEDICINE

## 2025-09-04 PROCEDURE — 82784 ASSAY IGA/IGD/IGG/IGM EACH: CPT | Mod: PORLAB | Performed by: INTERNAL MEDICINE

## 2025-09-04 PROCEDURE — 1160F RVW MEDS BY RX/DR IN RCRD: CPT | Performed by: INTERNAL MEDICINE

## 2025-09-04 PROCEDURE — 83521 IG LIGHT CHAINS FREE EACH: CPT | Mod: PORLAB | Performed by: INTERNAL MEDICINE

## 2025-09-04 RX ORDER — LOSARTAN POTASSIUM 50 MG/1
50 TABLET ORAL DAILY
Qty: 100 TABLET | Refills: 3 | Status: SHIPPED | OUTPATIENT
Start: 2025-09-04 | End: 2026-10-09

## 2025-09-04 ASSESSMENT — PAIN SCALES - GENERAL: PAINLEVEL_OUTOF10: 0-NO PAIN

## 2025-09-05 LAB
KAPPA LC SERPL-MCNC: 2.1 MG/DL (ref 0.33–1.94)
KAPPA LC/LAMBDA SER: 1.51 {RATIO} (ref 0.26–1.65)
LAMBDA LC SERPL-MCNC: 1.39 MG/DL (ref 0.57–2.63)

## 2025-11-18 ENCOUNTER — APPOINTMENT (OUTPATIENT)
Dept: PRIMARY CARE | Facility: CLINIC | Age: 80
End: 2025-11-18
Payer: MEDICARE